# Patient Record
Sex: FEMALE | Race: WHITE | NOT HISPANIC OR LATINO | URBAN - METROPOLITAN AREA
[De-identification: names, ages, dates, MRNs, and addresses within clinical notes are randomized per-mention and may not be internally consistent; named-entity substitution may affect disease eponyms.]

---

## 2017-01-21 ENCOUNTER — OUTPATIENT (OUTPATIENT)
Dept: OUTPATIENT SERVICES | Facility: HOSPITAL | Age: 67
LOS: 1 days | End: 2017-01-21
Payer: MEDICARE

## 2017-01-21 ENCOUNTER — APPOINTMENT (OUTPATIENT)
Dept: FAMILY MEDICINE | Facility: HOSPITAL | Age: 67
End: 2017-01-21

## 2017-01-21 VITALS
DIASTOLIC BLOOD PRESSURE: 93 MMHG | SYSTOLIC BLOOD PRESSURE: 179 MMHG | HEART RATE: 93 BPM | OXYGEN SATURATION: 98 % | WEIGHT: 112 LBS | RESPIRATION RATE: 16 BRPM | TEMPERATURE: 97 F | BODY MASS INDEX: 18.93 KG/M2

## 2017-01-21 DIAGNOSIS — M81.0 AGE-RELATED OSTEOPOROSIS WITHOUT CURRENT PATHOLOGICAL FRACTURE: ICD-10-CM

## 2017-01-21 DIAGNOSIS — I10 ESSENTIAL (PRIMARY) HYPERTENSION: ICD-10-CM

## 2017-01-21 DIAGNOSIS — E03.9 HYPOTHYROIDISM, UNSPECIFIED: ICD-10-CM

## 2017-01-21 PROCEDURE — G0463: CPT

## 2017-04-20 ENCOUNTER — OUTPATIENT (OUTPATIENT)
Dept: OUTPATIENT SERVICES | Facility: HOSPITAL | Age: 67
LOS: 1 days | End: 2017-04-20
Payer: MEDICARE

## 2017-04-20 ENCOUNTER — APPOINTMENT (OUTPATIENT)
Dept: FAMILY MEDICINE | Facility: HOSPITAL | Age: 67
End: 2017-04-20

## 2017-04-20 VITALS
HEIGHT: 64.5 IN | TEMPERATURE: 97.5 F | OXYGEN SATURATION: 98 % | HEART RATE: 65 BPM | RESPIRATION RATE: 14 BRPM | WEIGHT: 114 LBS | DIASTOLIC BLOOD PRESSURE: 60 MMHG | SYSTOLIC BLOOD PRESSURE: 97 MMHG | BODY MASS INDEX: 19.23 KG/M2

## 2017-04-20 DIAGNOSIS — E03.9 HYPOTHYROIDISM, UNSPECIFIED: ICD-10-CM

## 2017-04-20 DIAGNOSIS — I10 ESSENTIAL (PRIMARY) HYPERTENSION: ICD-10-CM

## 2017-04-20 DIAGNOSIS — M54.5 LOW BACK PAIN: ICD-10-CM

## 2017-04-20 PROCEDURE — G0463: CPT

## 2017-07-24 ENCOUNTER — RX RENEWAL (OUTPATIENT)
Age: 67
End: 2017-07-24

## 2017-11-03 ENCOUNTER — OUTPATIENT (OUTPATIENT)
Dept: OUTPATIENT SERVICES | Facility: HOSPITAL | Age: 67
LOS: 1 days | End: 2017-11-03
Payer: MEDICARE

## 2017-11-03 ENCOUNTER — APPOINTMENT (OUTPATIENT)
Dept: FAMILY MEDICINE | Facility: HOSPITAL | Age: 67
End: 2017-11-03

## 2017-11-03 VITALS
SYSTOLIC BLOOD PRESSURE: 128 MMHG | WEIGHT: 98 LBS | BODY MASS INDEX: 16.56 KG/M2 | OXYGEN SATURATION: 98 % | HEART RATE: 92 BPM | DIASTOLIC BLOOD PRESSURE: 86 MMHG | RESPIRATION RATE: 16 BRPM | TEMPERATURE: 98.4 F

## 2017-11-03 DIAGNOSIS — R63.6 UNDERWEIGHT: ICD-10-CM

## 2017-11-03 DIAGNOSIS — F32.9 MAJOR DEPRESSIVE DISORDER, SINGLE EPISODE, UNSPECIFIED: ICD-10-CM

## 2017-11-03 DIAGNOSIS — I10 ESSENTIAL (PRIMARY) HYPERTENSION: ICD-10-CM

## 2017-11-03 PROCEDURE — G0463: CPT

## 2018-01-12 ENCOUNTER — RX RENEWAL (OUTPATIENT)
Age: 68
End: 2018-01-12

## 2018-02-11 ENCOUNTER — RX RENEWAL (OUTPATIENT)
Age: 68
End: 2018-02-11

## 2018-06-14 ENCOUNTER — OUTPATIENT (OUTPATIENT)
Dept: OUTPATIENT SERVICES | Facility: HOSPITAL | Age: 68
LOS: 1 days | End: 2018-06-14
Payer: MEDICARE

## 2018-06-14 ENCOUNTER — APPOINTMENT (OUTPATIENT)
Dept: FAMILY MEDICINE | Facility: HOSPITAL | Age: 68
End: 2018-06-14

## 2018-06-14 VITALS
SYSTOLIC BLOOD PRESSURE: 124 MMHG | TEMPERATURE: 97.7 F | WEIGHT: 93.38 LBS | DIASTOLIC BLOOD PRESSURE: 84 MMHG | BODY MASS INDEX: 15.78 KG/M2 | OXYGEN SATURATION: 98 % | RESPIRATION RATE: 14 BRPM | HEART RATE: 67 BPM

## 2018-06-14 DIAGNOSIS — Z00.00 ENCOUNTER FOR GENERAL ADULT MEDICAL EXAMINATION WITHOUT ABNORMAL FINDINGS: ICD-10-CM

## 2018-06-14 DIAGNOSIS — Z00.00 ENCOUNTER FOR GENERAL ADULT MEDICAL EXAMINATION W/OUT ABNORMAL FINDINGS: ICD-10-CM

## 2018-06-14 PROCEDURE — G0439: CPT

## 2018-06-14 RX ORDER — ASPIRIN ENTERIC COATED TABLETS 81 MG 81 MG/1
81 TABLET, DELAYED RELEASE ORAL DAILY
Qty: 1 | Refills: 1 | Status: ACTIVE | COMMUNITY
Start: 2017-01-21 | End: 1900-01-01

## 2018-06-14 RX ORDER — ELECTROLYTES/DEXTROSE
SOLUTION, ORAL ORAL DAILY
Qty: 30 | Refills: 0 | Status: ACTIVE | COMMUNITY
Start: 2018-06-14 | End: 1900-01-01

## 2018-06-14 NOTE — HISTORY OF PRESENT ILLNESS
[de-identified] : 66 yr old female with PMHx of smoking 50y 1PPD- still smokes 1/2 PPD hep c, presenile dementia, hypothyroid,back pain, depression,right hip fracture ( underwent ORIF) , double pneumonia who lives alone presents to clinic today for feeling more tired/ general malaise for the last couple of weeks. She has lost approximately 20 lbs unintentional weight loss.  \par Says she gets up tired in the morning and progresses throughout the day but denies any motor weakness. Denies palpitations, shortness of breath, changes in bowel or urinary habits.  Says her appetite has decreased over the last couple of months; she however denies nausea/ vomiting. Denies feeling depressed. Has difficulty going to sleep, cannot sleep more than an hour.- takes ativan.

## 2018-06-14 NOTE — PHYSICAL EXAM
[No Acute Distress] : no acute distress [Cachectic] : cachexia was observed [PERRL] : pupils equal round and reactive to light [No JVD] : no jugular venous distention [No Respiratory Distress] : no respiratory distress  [Clear to Auscultation] : lungs were clear to auscultation bilaterally [Normal Rate] : normal rate  [Regular Rhythm] : with a regular rhythm [Normal S1, S2] : normal S1 and S2 [No Carotid Bruits] : no carotid bruits [Soft] : abdomen soft [Non Tender] : non-tender [Non-distended] : non-distended [No HSM] : no HSM [No CVA Tenderness] : no CVA  tenderness

## 2018-06-14 NOTE — PLAN
[FreeTextEntry1] : 66 yr old female with PMHx of smoking 50y 1PPD- still smokes 1/2 PPD hep c, presenile dementia, hypothyroid,back pain, depression,right hip fracture ( underwent ORIF) , double pneumonia who lives alone presents to clinic today for feeling more tired/ general malaise for the last couple of weeks.\par \par - Needs PCV 13 - patient has had multiple unsuccessful blood draws attempts today and is wary to any more needles today. Planned for next visit\par - CBC; CMP- including alpha fetoprotein as patient has h/o chronic HCV, Lipid profile; HBA1c; \par - FIT test\par - TSH: patient is on levothyroxine 150 mg daily\par - Low dose screening CT chest- patient is in the 55-80 range ; 50 pack years\par - Needs Tdap on next visit\par - Needs to be screened for abdominal aortic aneurysm\par - screening mammogram\par - recall in 1 month\par \par

## 2018-06-18 ENCOUNTER — LABORATORY RESULT (OUTPATIENT)
Age: 68
End: 2018-06-18

## 2018-06-18 DIAGNOSIS — Z00.00 ENCOUNTER FOR GENERAL ADULT MEDICAL EXAMINATION WITHOUT ABNORMAL FINDINGS: ICD-10-CM

## 2018-07-20 ENCOUNTER — APPOINTMENT (OUTPATIENT)
Dept: HEMATOLOGY ONCOLOGY | Facility: CLINIC | Age: 68
End: 2018-07-20
Payer: MEDICARE

## 2018-07-20 VITALS
BODY MASS INDEX: 17.3 KG/M2 | DIASTOLIC BLOOD PRESSURE: 64 MMHG | SYSTOLIC BLOOD PRESSURE: 110 MMHG | TEMPERATURE: 98 F | HEIGHT: 62 IN | HEART RATE: 72 BPM | WEIGHT: 94 LBS

## 2018-07-20 DIAGNOSIS — Z63.4 DISAPPEARANCE AND DEATH OF FAMILY MEMBER: ICD-10-CM

## 2018-07-20 DIAGNOSIS — Z60.2 PROBLEMS RELATED TO LIVING ALONE: ICD-10-CM

## 2018-07-20 DIAGNOSIS — Z78.9 OTHER SPECIFIED HEALTH STATUS: ICD-10-CM

## 2018-07-20 DIAGNOSIS — R46.0 VERY LOW LVL OF PERSONAL HYGIENE: ICD-10-CM

## 2018-07-20 DIAGNOSIS — Z82.49 FAMILY HISTORY OF ISCHEMIC HEART DISEASE AND OTHER DISEASES OF THE CIRCULATORY SYSTEM: ICD-10-CM

## 2018-07-20 DIAGNOSIS — F17.200 NICOTINE DEPENDENCE, UNSPECIFIED, UNCOMPLICATED: ICD-10-CM

## 2018-07-20 PROCEDURE — 99205 OFFICE O/P NEW HI 60 MIN: CPT

## 2018-07-20 SDOH — SOCIAL STABILITY - SOCIAL INSECURITY: DISSAPEARANCE AND DEATH OF FAMILY MEMBER: Z63.4

## 2018-07-20 SDOH — SOCIAL STABILITY - SOCIAL INSECURITY: PROBLEMS RELATED TO LIVING ALONE: Z60.2

## 2018-09-04 ENCOUNTER — LABORATORY RESULT (OUTPATIENT)
Age: 68
End: 2018-09-04

## 2018-09-04 LAB
ALBUMIN SERPL ELPH-MCNC: 4.2 G/DL
ALP BLD-CCNC: 87 U/L
ALT SERPL-CCNC: 27 U/L
ANION GAP SERPL CALC-SCNC: 12 MMOL/L
AST SERPL-CCNC: 34 U/L
BASOPHILS # BLD AUTO: 0.01 K/UL
BASOPHILS NFR BLD AUTO: 0.8 %
BILIRUB SERPL-MCNC: 0.5 MG/DL
BUN SERPL-MCNC: 24 MG/DL
CALCIUM SERPL-MCNC: 9.1 MG/DL
CHLORIDE SERPL-SCNC: 100 MMOL/L
CO2 SERPL-SCNC: 30 MMOL/L
CREAT SERPL-MCNC: 0.88 MG/DL
EOSINOPHIL # BLD AUTO: 0.04 K/UL
EOSINOPHIL NFR BLD AUTO: 3 %
HCT VFR BLD CALC: 36.1 %
HGB BLD-MCNC: 11.5 G/DL
IMM GRANULOCYTES NFR BLD AUTO: 0 %
LDH SERPL-CCNC: 173 U/L
LYMPHOCYTES # BLD AUTO: 0.88 K/UL
LYMPHOCYTES NFR BLD AUTO: 66.7 %
MAN DIFF?: NORMAL
MCHC RBC-ENTMCNC: 27.4 PG
MCHC RBC-ENTMCNC: 31.9 GM/DL
MCV RBC AUTO: 86.2 FL
MONOCYTES # BLD AUTO: 0.21 K/UL
MONOCYTES NFR BLD AUTO: 15.9 %
NEUTROPHILS # BLD AUTO: 0.18 K/UL
NEUTROPHILS NFR BLD AUTO: 13.6 %
PLATELET # BLD AUTO: 75 K/UL
POTASSIUM SERPL-SCNC: 5.1 MMOL/L
PROT SERPL-MCNC: 7.4 G/DL
RBC # BLD: 4.19 M/UL
RBC # FLD: 14.5 %
SODIUM SERPL-SCNC: 142 MMOL/L
WBC # FLD AUTO: 1.32 K/UL

## 2018-09-06 LAB — B2 MICROGLOB SERPL-MCNC: 6.6 MG/L

## 2018-09-11 ENCOUNTER — APPOINTMENT (OUTPATIENT)
Dept: HEMATOLOGY ONCOLOGY | Facility: CLINIC | Age: 68
End: 2018-09-11
Payer: MEDICARE

## 2018-09-11 VITALS
DIASTOLIC BLOOD PRESSURE: 54 MMHG | WEIGHT: 101 LBS | TEMPERATURE: 97.9 F | SYSTOLIC BLOOD PRESSURE: 86 MMHG | BODY MASS INDEX: 18.47 KG/M2 | HEART RATE: 72 BPM

## 2018-09-11 PROCEDURE — 99214 OFFICE O/P EST MOD 30 MIN: CPT

## 2018-11-06 ENCOUNTER — INPATIENT (INPATIENT)
Facility: HOSPITAL | Age: 68
LOS: 2 days | Discharge: HOME | DRG: 602 | End: 2018-11-09
Attending: FAMILY MEDICINE | Admitting: STUDENT IN AN ORGANIZED HEALTH CARE EDUCATION/TRAINING PROGRAM
Payer: MEDICARE

## 2018-11-06 VITALS
OXYGEN SATURATION: 99 % | SYSTOLIC BLOOD PRESSURE: 166 MMHG | WEIGHT: 100.09 LBS | DIASTOLIC BLOOD PRESSURE: 77 MMHG | TEMPERATURE: 98 F | RESPIRATION RATE: 17 BRPM | HEIGHT: 63 IN | HEART RATE: 75 BPM

## 2018-11-06 DIAGNOSIS — D69.6 THROMBOCYTOPENIA, UNSPECIFIED: ICD-10-CM

## 2018-11-06 DIAGNOSIS — L03.115 CELLULITIS OF RIGHT LOWER LIMB: ICD-10-CM

## 2018-11-06 DIAGNOSIS — E46 UNSPECIFIED PROTEIN-CALORIE MALNUTRITION: ICD-10-CM

## 2018-11-06 DIAGNOSIS — G30.0 ALZHEIMER'S DISEASE WITH EARLY ONSET: ICD-10-CM

## 2018-11-06 DIAGNOSIS — E03.9 HYPOTHYROIDISM, UNSPECIFIED: ICD-10-CM

## 2018-11-06 DIAGNOSIS — F17.200 NICOTINE DEPENDENCE, UNSPECIFIED, UNCOMPLICATED: ICD-10-CM

## 2018-11-06 DIAGNOSIS — D70.9 NEUTROPENIA, UNSPECIFIED: ICD-10-CM

## 2018-11-06 DIAGNOSIS — M54.5 LOW BACK PAIN: ICD-10-CM

## 2018-11-06 DIAGNOSIS — Z29.9 ENCOUNTER FOR PROPHYLACTIC MEASURES, UNSPECIFIED: ICD-10-CM

## 2018-11-06 LAB
ALBUMIN SERPL ELPH-MCNC: 3.4 G/DL — SIGNIFICANT CHANGE UP (ref 3.3–5)
ALP SERPL-CCNC: 82 U/L — SIGNIFICANT CHANGE UP (ref 40–120)
ALT FLD-CCNC: 30 U/L DA — SIGNIFICANT CHANGE UP (ref 10–45)
ANION GAP SERPL CALC-SCNC: 8 MMOL/L — SIGNIFICANT CHANGE UP (ref 5–17)
ANISOCYTOSIS BLD QL: SLIGHT — SIGNIFICANT CHANGE UP
APTT BLD: 41.6 SEC — HIGH (ref 27.5–36.3)
AST SERPL-CCNC: 38 U/L — SIGNIFICANT CHANGE UP (ref 10–40)
BASOPHILS NFR BLD AUTO: 1 % — SIGNIFICANT CHANGE UP (ref 0–2)
BILIRUB SERPL-MCNC: 0.7 MG/DL — SIGNIFICANT CHANGE UP (ref 0.2–1.2)
BUN SERPL-MCNC: 8 MG/DL — SIGNIFICANT CHANGE UP (ref 7–23)
CALCIUM SERPL-MCNC: 8.6 MG/DL — SIGNIFICANT CHANGE UP (ref 8.4–10.5)
CHLORIDE SERPL-SCNC: 103 MMOL/L — SIGNIFICANT CHANGE UP (ref 96–108)
CO2 SERPL-SCNC: 29 MMOL/L — SIGNIFICANT CHANGE UP (ref 22–31)
CREAT SERPL-MCNC: 0.95 MG/DL — SIGNIFICANT CHANGE UP (ref 0.5–1.3)
EOSINOPHIL NFR BLD AUTO: 3 % — SIGNIFICANT CHANGE UP (ref 0–6)
GLUCOSE SERPL-MCNC: 121 MG/DL — HIGH (ref 70–99)
HCT VFR BLD CALC: 35.6 % — SIGNIFICANT CHANGE UP (ref 34.5–45)
HGB BLD-MCNC: 11.8 G/DL — SIGNIFICANT CHANGE UP (ref 11.5–15.5)
INR BLD: 1.21 RATIO — HIGH (ref 0.88–1.16)
LYMPHOCYTES # BLD AUTO: 68 % — HIGH (ref 13–44)
MCHC RBC-ENTMCNC: 28.7 PG — SIGNIFICANT CHANGE UP (ref 27–34)
MCHC RBC-ENTMCNC: 33.2 GM/DL — SIGNIFICANT CHANGE UP (ref 32–36)
MCV RBC AUTO: 86.6 FL — SIGNIFICANT CHANGE UP (ref 80–100)
MONOCYTES NFR BLD AUTO: 17 % — HIGH (ref 2–14)
NEUTROPHILS NFR BLD AUTO: 7 % — LOW (ref 43–77)
NEUTS BAND # BLD: 1 % — SIGNIFICANT CHANGE UP (ref 0–8)
PLAT MORPH BLD: NORMAL — SIGNIFICANT CHANGE UP
PLATELET # BLD AUTO: 94 K/UL — LOW (ref 150–400)
POTASSIUM SERPL-MCNC: 3.6 MMOL/L — SIGNIFICANT CHANGE UP (ref 3.5–5.3)
POTASSIUM SERPL-SCNC: 3.6 MMOL/L — SIGNIFICANT CHANGE UP (ref 3.5–5.3)
PROT SERPL-MCNC: 7.6 G/DL — SIGNIFICANT CHANGE UP (ref 6–8.3)
PROTHROM AB SERPL-ACNC: 13.6 SEC — HIGH (ref 10–12.9)
RBC # BLD: 4.11 M/UL — SIGNIFICANT CHANGE UP (ref 3.8–5.2)
RBC # FLD: 12.5 % — SIGNIFICANT CHANGE UP (ref 10.3–14.5)
RBC BLD AUTO: ABNORMAL
SODIUM SERPL-SCNC: 140 MMOL/L — SIGNIFICANT CHANGE UP (ref 135–145)
VARIANT LYMPHS # BLD: 3 % — SIGNIFICANT CHANGE UP (ref 0–6)
WBC # BLD: 1.4 K/UL — LOW (ref 3.8–10.5)
WBC # FLD AUTO: 1.4 K/UL — LOW (ref 3.8–10.5)

## 2018-11-06 PROCEDURE — 93010 ELECTROCARDIOGRAM REPORT: CPT

## 2018-11-06 PROCEDURE — 99223 1ST HOSP IP/OBS HIGH 75: CPT

## 2018-11-06 PROCEDURE — 99222 1ST HOSP IP/OBS MODERATE 55: CPT

## 2018-11-06 PROCEDURE — 73590 X-RAY EXAM OF LOWER LEG: CPT | Mod: 26,RT

## 2018-11-06 PROCEDURE — 99285 EMERGENCY DEPT VISIT HI MDM: CPT

## 2018-11-06 RX ORDER — SODIUM CHLORIDE 9 MG/ML
3 INJECTION INTRAMUSCULAR; INTRAVENOUS; SUBCUTANEOUS ONCE
Qty: 0 | Refills: 0 | Status: COMPLETED | OUTPATIENT
Start: 2018-11-06 | End: 2018-11-06

## 2018-11-06 RX ORDER — MORPHINE SULFATE 50 MG/1
90 CAPSULE, EXTENDED RELEASE ORAL
Qty: 0 | Refills: 0 | Status: DISCONTINUED | OUTPATIENT
Start: 2018-11-06 | End: 2018-11-09

## 2018-11-06 RX ORDER — VANCOMYCIN HCL 1 G
1000 VIAL (EA) INTRAVENOUS ONCE
Qty: 0 | Refills: 0 | Status: COMPLETED | OUTPATIENT
Start: 2018-11-06 | End: 2018-11-06

## 2018-11-06 RX ORDER — SENNA PLUS 8.6 MG/1
2 TABLET ORAL AT BEDTIME
Qty: 0 | Refills: 0 | Status: DISCONTINUED | OUTPATIENT
Start: 2018-11-06 | End: 2018-11-09

## 2018-11-06 RX ORDER — LEVOTHYROXINE SODIUM 125 MCG
150 TABLET ORAL DAILY
Qty: 0 | Refills: 0 | Status: DISCONTINUED | OUTPATIENT
Start: 2018-11-06 | End: 2018-11-09

## 2018-11-06 RX ORDER — PIPERACILLIN AND TAZOBACTAM 4; .5 G/20ML; G/20ML
3.38 INJECTION, POWDER, LYOPHILIZED, FOR SOLUTION INTRAVENOUS ONCE
Qty: 0 | Refills: 0 | Status: COMPLETED | OUTPATIENT
Start: 2018-11-06 | End: 2018-11-06

## 2018-11-06 RX ORDER — MEMANTINE HYDROCHLORIDE 10 MG/1
10 TABLET ORAL
Qty: 0 | Refills: 0 | Status: DISCONTINUED | OUTPATIENT
Start: 2018-11-06 | End: 2018-11-09

## 2018-11-06 RX ORDER — SODIUM CHLORIDE 9 MG/ML
1000 INJECTION INTRAMUSCULAR; INTRAVENOUS; SUBCUTANEOUS
Qty: 0 | Refills: 0 | Status: DISCONTINUED | OUTPATIENT
Start: 2018-11-06 | End: 2018-11-09

## 2018-11-06 RX ORDER — ALPRAZOLAM 0.25 MG
2 TABLET ORAL
Qty: 0 | Refills: 0 | Status: DISCONTINUED | OUTPATIENT
Start: 2018-11-06 | End: 2018-11-09

## 2018-11-06 RX ORDER — ENOXAPARIN SODIUM 100 MG/ML
40 INJECTION SUBCUTANEOUS DAILY
Qty: 0 | Refills: 0 | Status: DISCONTINUED | OUTPATIENT
Start: 2018-11-06 | End: 2018-11-09

## 2018-11-06 RX ORDER — NICOTINE POLACRILEX 2 MG
1 GUM BUCCAL DAILY
Qty: 0 | Refills: 0 | Status: DISCONTINUED | OUTPATIENT
Start: 2018-11-06 | End: 2018-11-09

## 2018-11-06 RX ORDER — ZINC SULFATE TAB 220 MG (50 MG ZINC EQUIVALENT) 220 (50 ZN) MG
220 TAB ORAL DAILY
Qty: 0 | Refills: 0 | Status: DISCONTINUED | OUTPATIENT
Start: 2018-11-06 | End: 2018-11-09

## 2018-11-06 RX ORDER — ASCORBIC ACID 60 MG
500 TABLET,CHEWABLE ORAL DAILY
Qty: 0 | Refills: 0 | Status: DISCONTINUED | OUTPATIENT
Start: 2018-11-06 | End: 2018-11-09

## 2018-11-06 RX ORDER — TETANUS TOXOID, REDUCED DIPHTHERIA TOXOID AND ACELLULAR PERTUSSIS VACCINE, ADSORBED 5; 2.5; 8; 8; 2.5 [IU]/.5ML; [IU]/.5ML; UG/.5ML; UG/.5ML; UG/.5ML
0.5 SUSPENSION INTRAMUSCULAR ONCE
Qty: 0 | Refills: 0 | Status: COMPLETED | OUTPATIENT
Start: 2018-11-06 | End: 2018-11-06

## 2018-11-06 RX ORDER — DONEPEZIL HYDROCHLORIDE 10 MG/1
10 TABLET, FILM COATED ORAL AT BEDTIME
Qty: 0 | Refills: 0 | Status: DISCONTINUED | OUTPATIENT
Start: 2018-11-06 | End: 2018-11-09

## 2018-11-06 RX ORDER — MORPHINE SULFATE 50 MG/1
60 CAPSULE, EXTENDED RELEASE ORAL
Qty: 0 | Refills: 0 | Status: DISCONTINUED | OUTPATIENT
Start: 2018-11-06 | End: 2018-11-06

## 2018-11-06 RX ADMIN — ENOXAPARIN SODIUM 40 MILLIGRAM(S): 100 INJECTION SUBCUTANEOUS at 12:49

## 2018-11-06 RX ADMIN — SODIUM CHLORIDE 125 MILLILITER(S): 9 INJECTION INTRAMUSCULAR; INTRAVENOUS; SUBCUTANEOUS at 07:59

## 2018-11-06 RX ADMIN — PIPERACILLIN AND TAZOBACTAM 200 GRAM(S): 4; .5 INJECTION, POWDER, LYOPHILIZED, FOR SOLUTION INTRAVENOUS at 07:30

## 2018-11-06 RX ADMIN — Medication 250 MILLIGRAM(S): at 10:25

## 2018-11-06 RX ADMIN — MEMANTINE HYDROCHLORIDE 10 MILLIGRAM(S): 10 TABLET ORAL at 17:33

## 2018-11-06 RX ADMIN — DONEPEZIL HYDROCHLORIDE 10 MILLIGRAM(S): 10 TABLET, FILM COATED ORAL at 21:57

## 2018-11-06 RX ADMIN — SODIUM CHLORIDE 3 MILLILITER(S): 9 INJECTION INTRAMUSCULAR; INTRAVENOUS; SUBCUTANEOUS at 07:30

## 2018-11-06 RX ADMIN — MORPHINE SULFATE 90 MILLIGRAM(S): 50 CAPSULE, EXTENDED RELEASE ORAL at 18:47

## 2018-11-06 RX ADMIN — MORPHINE SULFATE 90 MILLIGRAM(S): 50 CAPSULE, EXTENDED RELEASE ORAL at 18:09

## 2018-11-06 RX ADMIN — Medication 1 PATCH: at 23:52

## 2018-11-06 RX ADMIN — Medication 2 MILLIGRAM(S): at 17:33

## 2018-11-06 RX ADMIN — Medication 100 MILLIGRAM(S): at 12:24

## 2018-11-06 RX ADMIN — Medication 100 MILLIGRAM(S): at 21:57

## 2018-11-06 RX ADMIN — PIPERACILLIN AND TAZOBACTAM 3.38 GRAM(S): 4; .5 INJECTION, POWDER, LYOPHILIZED, FOR SOLUTION INTRAVENOUS at 08:00

## 2018-11-06 NOTE — ED ADULT NURSE NOTE - CHPI ED NUR SYMPTOMS NEG
no vomiting/no purulent drainage/no rectal pain/no fever/no drainage/no bleeding at site/no blood in mucus/no chills

## 2018-11-06 NOTE — ED PROVIDER NOTE - NS ED ROS FT
Constitutional: (-) fever  (-)chills  (-)sweats  Eyes/ENT: (-) blurry vision, (-) epistaxis  (-)rhinorrhea   (-) sore throat    Cardiovascular: (-) chest pain, (-) palpitations (-) edema   Respiratory: (-) cough, (-) shortness of breath   Gastrointestinal: (-)nausea  (-)vomiting, (-) diarrhea  (-) abdominal pain   :  (-)dysuria, (-)frequency, (-)urgency, (-)hematuria  Musculoskeletal: (-) neck pain, (-) back pain, (-) joint pain  Integumentary: cellulitis open wound R leg (-) edema  Neurological: (-) headache, (-) altered mental status  (-)LOC

## 2018-11-06 NOTE — PROGRESS NOTE ADULT - PROBLEM SELECTOR PLAN 6
- Ensure Enlive TID with meals  - F/U Nutrition consult - Ensure Enlive TID with meals  - Vitamin C and zinc supplementation for wound healing  - F/U Nutrition consult

## 2018-11-06 NOTE — PROGRESS NOTE ADULT - PROBLEM SELECTOR PLAN 1
- IV Clindamycin TID (Day #1)  - s/p IV Vanco/Zosyn in ED  - Elevate right leg  - Warm compresses  - Ambulate as tolerated  - Surgery consult appreciated: No indication for debridement or surgical intervention at this time.  - F/U Blood Cultures

## 2018-11-06 NOTE — H&P ADULT - NSHPLABSRESULTS_GEN_ALL_CORE
LABS:                        11.8   1.4   )-----------( 94       ( 06 Nov 2018 07:15 )             35.6     11-06    140  |  103  |  8   ----------------------------<  121<H>  3.6   |  29  |  0.95    Ca    8.6      06 Nov 2018 07:15    TPro  7.6  /  Alb  3.4  /  TBili  0.7  /  DBili  x   /  AST  38  /  ALT  30  /  AlkPhos  82  11-06    PT/INR - ( 06 Nov 2018 07:15 )   PT: 13.6 sec;   INR: 1.21 ratio         PTT - ( 06 Nov 2018 07:15 )  PTT:41.6 sec     CAPILLARY BLOOD GLUCOSE                RADIOLOGY & ADDITIONAL TESTS:  < from: Xray Tibia + Fibula 2 Views, Right (11.06.18 @ 07:37) >    INTERPRETATION:  Left tib-fib. Patient had local injury.    There is no sense of knee effusion.    The knee and ankle are relatively free of degeneration.    No bone destruction or fracture is evident.    IMPRESSION: No acute finding.    < end of copied text >      Consultant(s) Notes Reviewed:  [x ] YES  [ ] NO  Care Discussed with Consultants/Other Providers [ x] YES  [ ] NO  Imaging Personally Reviewed:  [ ] YES  [ ] NO

## 2018-11-06 NOTE — PATIENT PROFILE ADULT - VISION (WITH CORRECTIVE LENSES IF THE PATIENT USUALLY WEARS THEM):
Normal vision: sees adequately in most situations; can see medication labels, newsprint/wears reading glass

## 2018-11-06 NOTE — CONSULT NOTE ADULT - PROBLEM SELECTOR RECOMMENDATION 9
Patient with chronic absolute neutropenia, due to possible underlying marrow disorder ( eg. an underlying myeloproliferative disorder cannot be excluded). As patient declined several times bone marrow aspirate and biopsy. Use of G-CSF may be considered, though it may exacerbate underlying marrow process. Agree with broad antibiotic coverage.

## 2018-11-06 NOTE — PROGRESS NOTE ADULT - SUBJECTIVE AND OBJECTIVE BOX
CHIEF COMPLAINT: Right Lower leg Injury/Pain    SUBJECTIVE:   HPI:  67 yo F patient of Dr. Adame admitted today 11/6/18 for right cellulitic wound. Pt hit her right shin against an "old metal arnold piece" on an antique piece of furniture in her home 2 weeks ago. Pt describes that wound initially started to heal but she re-traumatized region by hitting exact same region against metal piece approx 5 days following initial incident. She became concerned as the area round  wound became very red, painful, tender to touch with black scab formation over wound. She denies any fevers, chills, or loss of circulation/sensation in her extremities. Pt describes depressed mood, decreased appetite with associated unintentional weight loss over the past year (estimates 10-15 pound loss in the last 6 months). She  feels lonely living at home with her dog after her  of 35 years passed away about 5 years ago. Pt is a current 1ppd smoker and has smoked for the last 50 years. She has a history of chronic back pain for the last 18 years for which she is prescribed morphine and Xanax from pain management physician.  Pt also has history of neutropenia/leukopenia that is being addressed by Dr. Gloria (Heme/Onc). Pt has dementia (diagnosed in 2002), which she takes Aricept and Namenda for.     11/6: Pt was initially admitted to Medicine service this morning and was transferred to Family Practice service this afternoon. The above history was obtained from patient. Pt was seen and examined at bedside. Dr. Sun (Surgery) was also at bedside, assessed patient and does not see need for surgical intervention at this time.     REVIEW OF SYSTEMS:    CONSTITUTIONAL: +Weight loss  No fevers or chills  RESPIRATORY: No cough, wheezing, hemoptysis; No shortness of breath  CARDIOVASCULAR: No chest pain or palpitations  GASTROINTESTINAL: No abdominal or epigastric pain. No nausea, vomiting, or hematemesis; No diarrhea or constipation. GENITOURINARY: No dysuria, frequency or hematuria  NEUROLOGICAL: +Chronic Back Pain; +Forgetful (diagnosed with dementia) No numbness or weakness  SKIN: + Right leg wound with pain   PSYCH: +Depressed Mood; +Anorexia; +Anhedonia  All other review of systems is negative unless indicated above    Vital Signs Last 24 Hrs  T(C): 37 (06 Nov 2018 15:19), Max: 37 (06 Nov 2018 15:19)  T(F): 98.6 (06 Nov 2018 15:19), Max: 98.6 (06 Nov 2018 15:19)  HR: 65 (06 Nov 2018 15:19) (60 - 75)  BP: 114/91 (06 Nov 2018 15:19) (114/91 - 166/77)  RR: 19 (06 Nov 2018 15:19) (17 - 19)  SpO2: 98% (06 Nov 2018 15:19) (97% - 99%)      PHYSICAL EXAM:  Constitutional: fail cachectic woman in mild distress due to pain, awake and alert  HEENT: PERR, EOMI, Normal Hearing, MMM  Neck: Soft and supple, No LAD, No JVD  Respiratory: Breath sounds are clear bilaterally, No wheezing, rales or rhonchi  Cardiovascular: S1 and S2, regular rate and rhythm, no Murmurs, gallops or rubs  Gastrointestinal: Bowel Sounds present, soft, nontender, nondistended, no guarding, no rebound  Extremities: No peripheral edema  Vascular: 2+ peripheral pulses  Neurological: A/O x 3, no focal deficits  Musculoskeletal: 5/5 strength b/l upper and lower extremities  Skin: No rashes    MEDICATIONS:  MEDICATIONS  (STANDING):  ALPRAZolam 2 milliGRAM(s) Oral two times a day  ascorbic acid 500 milliGRAM(s) Oral daily  clindamycin IVPB      clindamycin IVPB 600 milliGRAM(s) IV Intermittent every 8 hours  diphtheria/tetanus/pertussis (acellular) Vaccine (ADAcel) 0.5 milliLiter(s) IntraMuscular once  donepezil 10 milliGRAM(s) Oral at bedtime  enoxaparin Injectable 40 milliGRAM(s) SubCutaneous daily  levothyroxine 150 MICROGram(s) Oral daily  memantine 10 milliGRAM(s) Oral two times a day  morphine ER Tablet 90 milliGRAM(s) Oral two times a day  multivitamin 1 Tablet(s) Oral daily  nicotine -  14 mG/24Hr(s) Patch 1 patch Transdermal daily  sodium chloride 0.9%. 1000 milliLiter(s) (125 mL/Hr) IV Continuous <Continuous>  zinc sulfate 220 milliGRAM(s) Oral daily      LABS: All Labs Reviewed:                        11.8   1.4   )-----------( 94       ( 06 Nov 2018 07:15 )             35.6     11-06    140  |  103  |  8   ----------------------------<  121<H>  3.6   |  29  |  0.95    Ca    8.6      06 Nov 2018 07:15    TPro  7.6  /  Alb  3.4  /  TBili  0.7  /  DBili  x   /  AST  38  /  ALT  30  /  AlkPhos  82  11-06    PT/INR - ( 06 Nov 2018 07:15 )   PT: 13.6 sec;   INR: 1.21 ratio         PTT - ( 06 Nov 2018 07:15 )  PTT:41.6 sec      Blood Culture:     RADIOLOGY/EKG:    DVT PPX:    ADVANCED DIRECTIVE:    DISPOSITION: CHIEF COMPLAINT: Right Lower leg Injury/Pain    SUBJECTIVE:   HPI:  69 yo F patient of Dr. Adame admitted today 11/6/18 for right cellulitic wound. Pt hit her right shin against an "old metal arnold piece" on an antique piece of furniture in her home 2 weeks ago. Pt describes that wound initially started to heal but she re-traumatized region by hitting the exact same region against metal piece approx 5 days following initial incident. She became concerned as the area around  wound became very red, painful, tender to touch with black scab formation over wound. She denies any fevers, chills, or loss of circulation/sensation in her extremities. Pt describes depressed mood, decreased appetite with associated unintentional weight loss over the past year (estimates 10-15 pound loss in the last 6 months). She  feels lonely living at home with her dog after her  of 35 years passed away about 5 years ago. Pt is a current 1ppd smoker and has smoked for the last 50 years. She has a history of chronic back pain for the last 18 years for which she is prescribed morphine and Xanax from pain management physician.  Pt also has history of neutropenia/leukopenia that is being addressed by Dr. Gloria (Heme/Onc). Pt has dementia (diagnosed in 2002), which she takes Aricept and Namenda for.     In the ED, she was given IV Vancomycin, IV Zosyn, IV Clindamycin and started on IV NaCL @125mL/hr. She was afebrile on triage and has remained afebrile to present. All other vitals are within normal limits.     11/6: Pt was initially admitted to Medicine service this morning and was transferred to Family Practice service this afternoon. The above history was obtained from patient. Pt was seen and examined at bedside. Pt notes that Dr. Sun (Surgery) was also at bedside, assessed patient and does not see need for surgical intervention at this time.     REVIEW OF SYSTEMS:  CONSTITUTIONAL: +Weight loss  No fevers or chills  RESPIRATORY: No cough, wheezing, hemoptysis; No shortness of breath  CARDIOVASCULAR: No chest pain or palpitations  GASTROINTESTINAL: No abdominal or epigastric pain. No nausea, vomiting, or hematemesis; No diarrhea or constipation. GENITOURINARY: No dysuria, frequency or hematuria  NEUROLOGICAL: +Chronic Back Pain; +Forgetful (diagnosed with dementia) No numbness or weakness  SKIN: + Right leg wound with pain   PSYCH: +Depressed Mood; +Anorexia; +Anhedonia  All other review of systems is negative unless indicated above    Vital Signs Last 24 Hrs  T(C): 37 (06 Nov 2018 15:19), Max: 37 (06 Nov 2018 15:19)  T(F): 98.6 (06 Nov 2018 15:19), Max: 98.6 (06 Nov 2018 15:19)  HR: 65 (06 Nov 2018 15:19) (60 - 75)  BP: 114/91 (06 Nov 2018 15:19) (114/91 - 166/77)  RR: 19 (06 Nov 2018 15:19) (17 - 19)  SpO2: 98% (06 Nov 2018 15:19) (97% - 99%)      PHYSICAL EXAM:  Constitutional: frail cachectic woman in mild distress due to pain, awake and alert  HEENT: PERR, EOMI, Normal Hearing  Neck: Soft and supple, No LAD, No JVD  Respiratory: Breath sounds are clear bilaterally, No wheezing, rales or rhonchi  Cardiovascular: S1 and S2, regular rate and rhythm, no Murmurs, gallops or rubs  Gastrointestinal: Bowel Sounds present, soft, nontender, nondistended, no guarding, no rebound  Extremities: 1+ pedal edema in right extremity; approx 5 cm black eschar formation on right anterior leg with mild erythema surrounding, tender to light palpation, dorsalis pedis pulses 2+ intact  Vascular: 2+ peripheral pulses  Neurological: A/O x 3, no focal deficits, sensation intact in all extremities  Musculoskeletal: 5/5 strength b/l upper and lower extremities  Skin: pprox 5 cm black eschar formation on right anterior leg with mild erythema surrounding, tender to light palpation,    MEDICATIONS:  MEDICATIONS  (STANDING):  ALPRAZolam 2 milliGRAM(s) Oral two times a day  ascorbic acid 500 milliGRAM(s) Oral daily  clindamycin IVPB      clindamycin IVPB 600 milliGRAM(s) IV Intermittent every 8 hours  diphtheria/tetanus/pertussis (acellular) Vaccine (ADAcel) 0.5 milliLiter(s) IntraMuscular once  donepezil 10 milliGRAM(s) Oral at bedtime  enoxaparin Injectable 40 milliGRAM(s) SubCutaneous daily  levothyroxine 150 MICROGram(s) Oral daily  memantine 10 milliGRAM(s) Oral two times a day  morphine ER Tablet 90 milliGRAM(s) Oral two times a day  multivitamin 1 Tablet(s) Oral daily  nicotine -  14 mG/24Hr(s) Patch 1 patch Transdermal daily  sodium chloride 0.9%. 1000 milliLiter(s) (125 mL/Hr) IV Continuous <Continuous>  zinc sulfate 220 milliGRAM(s) Oral daily    MEDICATIONS  (PRN):  senna 2 Tablet(s) Oral at bedtime PRN Constipation      LABS: All Labs Reviewed:                        11.8   1.4   )-----------( 94       ( 06 Nov 2018 07:15 )             35.6     11-06    140  |  103  |  8   ----------------------------<  121<H>  3.6   |  29  |  0.95    Ca    8.6      06 Nov 2018 07:15    TPro  7.6  /  Alb  3.4  /  TBili  0.7  /  DBili  x   /  AST  38  /  ALT  30  /  AlkPhos  82  11-06    PT/INR - ( 06 Nov 2018 07:15 )   PT: 13.6 sec;   INR: 1.21 ratio    PTT - ( 06 Nov 2018 07:15 )  PTT:41.6 sec      Blood Culture: Pending    RADIOLOGY/EKG: < from: Xray Tibia + Fibula 2 Views, Right (11.06.18 @ 07:37) >  INTERPRETATION:  Left tib-fib. Patient had local injury.    There is no sense of knee effusion.    The knee and ankle are relatively free of degeneration.    No bone destruction or fracture is evident.    IMPRESSION: No acute finding.      DVT PPX: ON Lovenox    DISPOSITION: [facility]ON Unit CHIEF COMPLAINT: Right Lower leg Injury/Pain    SUBJECTIVE:   HPI:  69 yo F patient of Dr. Adame admitted today 11/6/18 for right cellulitic wound. Pt hit her right shin against an "old metal arnold piece" on an antique piece of furniture in her home 2 weeks ago. Pt describes that wound initially started to heal but she re-traumatized region by hitting the exact same region against metal piece approx 5 days following initial incident. She became concerned as the area around  wound became very red, painful, tender to touch with black scab formation over wound. She denies any fevers, chills, or loss of circulation/sensation in her extremities. Pt describes depressed mood, decreased appetite with associated unintentional weight loss over the past year (estimates 10-15 pound loss in the last 6 months). She  feels lonely living at home with her dog after her  of 35 years passed away about 5 years ago. Pt is a current 1ppd smoker and has smoked for the last 50 years. She has a history of chronic back pain for the last 18 years for which she is prescribed morphine and Xanax from pain management physician.  Pt also has history of neutropenia/leukopenia that is being addressed by Dr. Gloria (Heme/Onc). Pt has dementia (diagnosed in 2002), which she takes Aricept and Namenda for.     In the ED, she was given IV Vancomycin, IV Zosyn, IV Clindamycin and started on IV NaCL @125mL/hr. She was afebrile on triage and has remained afebrile to present. All other vitals are within normal limits.     11/6: Pt was initially admitted to Medicine service this morning and was transferred to Family Practice service this afternoon. The above history was obtained from patient. Pt was seen and examined at bedside. Pt notes that redness improved following IV Abx. Dr. Sun (Surgery) was also at bedside, assessed patient and does not see need for surgical intervention at this time.     REVIEW OF SYSTEMS:  CONSTITUTIONAL: +Weight loss  No fevers or chills  RESPIRATORY: No cough, wheezing, hemoptysis; No shortness of breath  CARDIOVASCULAR: No chest pain or palpitations  GASTROINTESTINAL: No abdominal or epigastric pain. No nausea, vomiting, or hematemesis; No diarrhea or constipation. GENITOURINARY: No dysuria, frequency or hematuria  NEUROLOGICAL: +Chronic Back Pain; +Forgetful (diagnosed with dementia) No numbness or weakness  SKIN: + Right leg wound with pain   PSYCH: +Depressed Mood; +Anorexia; +Anhedonia  All other review of systems is negative unless indicated above    Vital Signs Last 24 Hrs  T(C): 37 (06 Nov 2018 15:19), Max: 37 (06 Nov 2018 15:19)  T(F): 98.6 (06 Nov 2018 15:19), Max: 98.6 (06 Nov 2018 15:19)  HR: 65 (06 Nov 2018 15:19) (60 - 75)  BP: 114/91 (06 Nov 2018 15:19) (114/91 - 166/77)  RR: 19 (06 Nov 2018 15:19) (17 - 19)  SpO2: 98% (06 Nov 2018 15:19) (97% - 99%)      PHYSICAL EXAM:  Constitutional: frail cachectic woman in mild distress due to pain, awake and alert  HEENT: PERR, EOMI, Normal Hearing  Neck: Soft and supple, No LAD, No JVD  Respiratory: Breath sounds are clear bilaterally, No wheezing, rales or rhonchi  Cardiovascular: S1 and S2, regular rate and rhythm, no Murmurs, gallops or rubs  Gastrointestinal: Bowel Sounds present, soft, nontender, nondistended, no guarding, no rebound  Extremities: 1+ pedal edema in right extremity; approx 5 cm black eschar formation on right anterior leg with mild erythema surrounding, tender to light palpation, dorsalis pedis pulses 2+ intact  Vascular: 2+ peripheral pulses  Neurological: A/O x 3, no focal deficits, sensation intact in all extremities  Musculoskeletal: 5/5 strength b/l upper and lower extremities  Skin: pprox 5 cm black eschar formation on right anterior leg with mild erythema surrounding, tender to light palpation,    MEDICATIONS:  MEDICATIONS  (STANDING):  ALPRAZolam 2 milliGRAM(s) Oral two times a day  ascorbic acid 500 milliGRAM(s) Oral daily  clindamycin IVPB      clindamycin IVPB 600 milliGRAM(s) IV Intermittent every 8 hours  diphtheria/tetanus/pertussis (acellular) Vaccine (ADAcel) 0.5 milliLiter(s) IntraMuscular once  donepezil 10 milliGRAM(s) Oral at bedtime  enoxaparin Injectable 40 milliGRAM(s) SubCutaneous daily  levothyroxine 150 MICROGram(s) Oral daily  memantine 10 milliGRAM(s) Oral two times a day  morphine ER Tablet 90 milliGRAM(s) Oral two times a day  multivitamin 1 Tablet(s) Oral daily  nicotine -  14 mG/24Hr(s) Patch 1 patch Transdermal daily  sodium chloride 0.9%. 1000 milliLiter(s) (125 mL/Hr) IV Continuous <Continuous>  zinc sulfate 220 milliGRAM(s) Oral daily    MEDICATIONS  (PRN):  senna 2 Tablet(s) Oral at bedtime PRN Constipation      LABS: All Labs Reviewed:                        11.8   1.4   )-----------( 94       ( 06 Nov 2018 07:15 )             35.6     11-06    140  |  103  |  8   ----------------------------<  121<H>  3.6   |  29  |  0.95    Ca    8.6      06 Nov 2018 07:15    TPro  7.6  /  Alb  3.4  /  TBili  0.7  /  DBili  x   /  AST  38  /  ALT  30  /  AlkPhos  82  11-06    PT/INR - ( 06 Nov 2018 07:15 )   PT: 13.6 sec;   INR: 1.21 ratio    PTT - ( 06 Nov 2018 07:15 )  PTT:41.6 sec      Blood Culture: Pending    RADIOLOGY/EKG: < from: Xray Tibia + Fibula 2 Views, Right (11.06.18 @ 07:37) >  INTERPRETATION:  Left tib-fib. Patient had local injury.    There is no sense of knee effusion.    The knee and ankle are relatively free of degeneration.    No bone destruction or fracture is evident.    IMPRESSION: No acute finding.      DVT PPX: ON Lovenox    DISPOSITION: [facility]ON Unit

## 2018-11-06 NOTE — ED PROVIDER NOTE - PHYSICAL EXAMINATION
General:     NAD, well-nourished, well-appearing  Head:     NC/AT, EOMI, oral mucosa moist  Neck:     trachea midline  Lungs:     CTA b/l, no w/r/r  CVS:     S1S2, RRR, no m/g/r  Abd:     +BS, s/nt/nd, no organomegaly  Ext:    2+ radial and pedal pulses, no c/c/e R LL open wound with dark discoloration scab erythema tender warm around that area, neuro vs R LL intact  Neuro: AAOx3, no sensory/motor deficits

## 2018-11-06 NOTE — CONSULT NOTE ADULT - SUBJECTIVE AND OBJECTIVE BOX
ORLANDO BRUNSON,  68y Female  MRN: [id number]  ATTENDING: Dr.Jenifer Whitten      HPI:  68 female with known depression and anxiety, patient of [facility], under my care since July 2018, with persistent macrocytosis and pancytopenia. Patient admitted with cellulitic rash on right LE, in the setting of absolute neutropenia (ANC 98). Patient started on double antibiotic coverage in ED. Patient declined hematologic workup with bone marrow biopsy/ aspirate; her cytopenias are chronic, but she has been observed off growth factor support. Evaluated by surgery; no debridement necessary.    PAST MEDICAL & SURGICAL HISTORY:  Pancytopenia  Alzheimer disease  Chronic back pain  Hypothyroid  Nasal septal deviation repair; tonsillectomy      OB/GYN: Menopause at 52; no children    MEDICATION:  ALPRAZolam 2 milliGRAM(s) Oral two times a day  ascorbic acid 500 milliGRAM(s) Oral daily     clindamycin IVPB 600 milliGRAM(s) IV Intermittent every 8 hours  diphtheria/tetanus/pertussis (acellular) Vaccine (ADAcel) 0.5 milliLiter(s) IntraMuscular once  donepezil 10 milliGRAM(s) Oral at bedtime  enoxaparin Injectable 40 milliGRAM(s) SubCutaneous daily  levothyroxine 150 MICROGram(s) Oral daily  memantine 10 milliGRAM(s) Oral two times a day  morphine ER Tablet 90 milliGRAM(s) Oral two times a day  multivitamin 1 Tablet(s) Oral daily  nicotine -  14 mG/24Hr(s) Patch 1 patch Transdermal daily  sodium chloride 0.9%. 1000 milliLiter(s) IV Continuous <Continuous>  zinc sulfate 220 milliGRAM(s) Oral daily    ALLERGIES:  Talwin (Unknown)    FAMILY HISTORY:  Paternal- cardiac disease    SOCIAL HISTORY:  Tobacco: YES [ x ]  ; NO [ ]; Former smoker [ ]  Alcohol:   YES [ ]  ; NO [x ]; Social alcohol user [ ]  Occupation/ marital status/ children: ; no children    REVIEW SYSTEMS:  Constitutional: significant weight loss, malnourished  HEENT: no oral thrush, or dysphagia  Respiratory: no dyspnea , + chronic cough  Cardiovascular: denies chest pain, palpitations  GI: no abdominal tenderness / pain; no change in bowel habits  Musculoskeletal: joint pain / swelling  Integumentary: cellulitic rash right lower extremity  Anxious, depressed    VITALS:  T(C): 37, Max: 37 (11-06-18 @ 15:19)  T(F): 98.6, Max: 98.6 (11-06-18 @ 15:19)  HR: 65 (60 - 75)  BP: 114/91 (114/91 - 166/77)  SpO2: 98% (97% - 99%)    PHYSICAL EXAM:  Constitutional: Alert and oriented x 3  Eyes/ ENMT: PERRLA,   Respiratory: clear to auscultation bilaterally  Cardiovascular: S1,S2 rhythmic  Gastrointestinal: no guarding/ rebound  Extremities: no calf tenderness;  peripheral edema  Skin: +cellulitic rash right lower extremity    LABS:  (11-06) WBC: 1.4 K/uL,Hemoglobin: 11.8 g/dL, Hematocrit: 35.6 %,  Platelet: 94 K/uL  (11-06) Na: 140 mmol/L ; K: 3.6 mmol/L ; BUN: 8 mg/dL ; Cr: 0.95 mg/dL.  PT/INR - ( 06 Nov 2018 07:15 )   PT: 13.6 sec;   INR: 1.21 ratio  PTT - ( 06 Nov 2018 07:15 )  PTT:41.6 sec    RADIOLOGY:  Xray Tibia + Fibula 2 Views, Right (11.06.18 @ 07:37) >  EXAM:  LEG AP&LAT RIGHT    PROCEDURE DATE:  11/06/2018    INTERPRETATION:  Left tib-fib. Patient had local injury.  There is no sense of knee effusion.The knee and ankle are relatively free of degeneration.  No bone destruction or fracture is evident.  IMPRESSION: No acute finding.

## 2018-11-06 NOTE — H&P ADULT - NSHPREVIEWOFSYSTEMS_GEN_ALL_CORE
REVIEW OF SYSTEMS:  CONSTITUTIONAL: No fever, endorses clothes are getting looser on her, denies frequent falls  EYES: No eye pain, visual disturbances, or discharge  ENMT:  No difficulty hearing, tinnitus, vertigo; No sinus or throat pain  NECK: No pain or stiffness  BREASTS: No pain, masses, or nipple discharge  RESPIRATORY: No cough, wheezing, chills or hemoptysis; No shortness of breath  CARDIOVASCULAR: No chest pain, palpitations, dizziness, or leg swelling  GASTROINTESTINAL: No abdominal or epigastric pain. No nausea, vomiting, or hematemesis; No diarrhea or constipation. No melena or hematochezia.  GENITOURINARY: No dysuria, frequency, hematuria, or incontinence  NEUROLOGICAL: No headaches, loss of strength, numbness, or tremors  SKIN: No itching, burning, rashes, or lesions   LYMPH NODES: No enlarged glands  ENDOCRINE: No heat or cold intolerance; No hair loss  MUSCULOSKELETAL: No joint pain or swelling; No muscle, back, or extremity pain  PSYCHIATRIC: No depression, mood swings, or difficulty sleeping  Reports great anxiety due to illness  HEME/LYMPH: No easy bruising, or bleeding gums  ALLERGY AND IMMUNOLOGIC: No hives or eczema    ALL ROS REVIEWED AND NORMAL EXCEPT AS STATED ABOVE

## 2018-11-06 NOTE — H&P ADULT - NSHPPHYSICALEXAM_GEN_ALL_CORE
T(C): 36.6 (11-06-18 @ 06:45), Max: 36.6 (11-06-18 @ 06:45)  HR: 75 (11-06-18 @ 06:45) (75 - 75)  BP: 166/77 (11-06-18 @ 06:45) (166/77 - 166/77)  RR: 17 (11-06-18 @ 06:45) (17 - 17)  SpO2: 99% (11-06-18 @ 06:45) (99% - 99%)  Wt(kg): --Vital Signs Last 24 Hrs  T(C): 36.6 (06 Nov 2018 06:45), Max: 36.6 (06 Nov 2018 06:45)  T(F): 97.9 (06 Nov 2018 06:45), Max: 97.9 (06 Nov 2018 06:45)  HR: 75 (06 Nov 2018 06:45) (75 - 75)  BP: 166/77 (06 Nov 2018 06:45) (166/77 - 166/77)  BP(mean): --  RR: 17 (06 Nov 2018 06:45) (17 - 17)  SpO2: 99% (06 Nov 2018 06:45) (99% - 99%)    PHYSICAL EXAM:  GENERAL: NAD, well-groomed, well-developed, under weight  HEAD:  Atraumatic, Normocephalic  EYES: EOMI, PERRLA, conjunctiva and sclera clear  ENMT: No tonsillar erythema, exudates, or enlargement; Moist mucous membranes, Good dentition, No lesions  NECK: Supple, No JVD, Normal thyroid  NERVOUS SYSTEM:  Alert & Oriented X3, Good concentration; Motor Strength 5/5 B/L upper and lower extremities; DTRs 2+ intact and symmetric  CHEST/LUNG: Clear to percussion bilaterally; No rales, rhonchi, wheezing, or rubs  HEART: Regular rate and rhythm; No murmurs, rubs, or gallops  ABDOMEN: Soft, Nontender, Nondistended; Bowel sounds present  EXTREMITIES:  2+ Peripheral Pulses,wound on right leg - eschar base with surrounding erythema   LYMPH: No lymphadenopathy noted  SKIN: No rashes or lesions

## 2018-11-06 NOTE — CONSULT NOTE ADULT - ASSESSMENT
68 female with known depression and anxiety, patient of [facility], under my care since July 2018, with persistent macrocytosis and pancytopenia. Patient admitted with cellulitic rash on right LE, in the setting of absolute neutropenia (ANC 98). Patient started on double antibiotic coverage in ED. Patient declined hematologic workup with bone marrow biopsy/ aspirate; her cytopenias are chronic, but she has been observed off growth factor support. Evaluated by surgery; no debridement necessary.

## 2018-11-06 NOTE — CONSULT NOTE ADULT - SUBJECTIVE AND OBJECTIVE BOX
68 year old with a PMH of hypothyroidism Alzheimer's disease, neutropenia/leukopenia (under the care of Dr. Leon) comes with a wound on right shin that is approximately 2 weeks old and not healing.      patient tells me she hit her right lower leg on an antique metal piece two weeks ago and reinjured recently. Has been self treating. Not a diabetic.      rle-medial based dry eschar 3x4 cm  some surrounding erythema, no evidence of infection    excellent distal(dp) pulse  no calf compartment syndrome  no neurovascular compromise    Complete Blood Count + Automated Diff (11.06.18 @ 07:15)    WBC Count: 1.4 K/uL    RBC Count: 4.11 M/uL    Hemoglobin: 11.8 g/dL    Hematocrit: 35.6 %    Mean Cell Volume: 86.6 fl    Mean Cell Hemoglobin: 28.7 pg    Mean Cell Hemoglobin Conc: 33.2 gm/dL    Red Cell Distrib Width: 12.5 %    Platelet Count - Automated: 94 K/uL    Auto Neutrophil %: 7.0: Differential percentages must be correlated with absolute numbers for  clinical significance. %    Auto Lymphocyte %: 68.0 %    Auto Monocyte %: 17.0 %    Auto Eosinophil %: 3.0 %    Auto Basophil %: 1.0 %

## 2018-11-06 NOTE — ED ADULT NURSE REASSESSMENT NOTE - NS ED NURSE REASSESS COMMENT FT1
Assume care of pt from Brynn Pineda RN. Pt sitting up in stretcher awake and speaking with Dr. Espino at bedside. Pt c/o right shin pain and redness surrounding 2 week old wound from accidentally hitting leg with metal furniture 2 weeks ago and accientally hitting shin again 1 week. Ago. Pt denies fevers.

## 2018-11-06 NOTE — CONSULT NOTE ADULT - PROBLEM SELECTOR RECOMMENDATION 2
Platelet count at 90 K. In ambulatory, platelet count 74K. No need for  transfusion support, especially if surgery is not contemplated. Will continue to monitor CBC.

## 2018-11-06 NOTE — CONSULT NOTE ADULT - REASON FOR ADMISSION
Cellulitic wound with known neutropenia/leukopenia
Cellulitic wound with known neutropenia/leukopenia

## 2018-11-06 NOTE — H&P ADULT - ASSESSMENT
68 year old with a PMH of hypothyroidism Alzheimer's disease, neutropenia/leukopenia (under the care of Dr. Leon) comes with a wound on right shin that is approximately 2 weeks old and not healing.

## 2018-11-06 NOTE — CONSULT NOTE ADULT - ASSESSMENT
a/p    no indication for debridement  patient may ambulate but when not ambulate leg should be elevated      thank you

## 2018-11-06 NOTE — ED ADULT NURSE NOTE - OBJECTIVE STATEMENT
Pt c/o right lower leg wound x2 weeks s/p hitting leg on cast iron coat rack 2 months ago and then hitting wound again 2 weeks ago. Pt presents with scabbed wound to right lower leg with surrounding erythema. Pt is ambulatory. PMS in tact. Pt denies fevers.

## 2018-11-06 NOTE — ED PROVIDER NOTE - MEDICAL DECISION MAKING DETAILS
open wound R leg leading to cellulitis- labs xray R tib fib iv abx tdap open wound R leg leading to cellulitis- labs xray R tib fib iv abx tdap, pt neutrtopenic will be admitted   has a dog at home no one to care , palma  informed

## 2018-11-07 ENCOUNTER — TRANSCRIPTION ENCOUNTER (OUTPATIENT)
Age: 68
End: 2018-11-07

## 2018-11-07 DIAGNOSIS — D61.818 OTHER PANCYTOPENIA: ICD-10-CM

## 2018-11-07 DIAGNOSIS — D64.9 ANEMIA, UNSPECIFIED: ICD-10-CM

## 2018-11-07 LAB
ANION GAP SERPL CALC-SCNC: 6 MMOL/L — SIGNIFICANT CHANGE UP (ref 5–17)
BASOPHILS NFR BLD AUTO: 1 % — SIGNIFICANT CHANGE UP (ref 0–2)
BUN SERPL-MCNC: 12 MG/DL — SIGNIFICANT CHANGE UP (ref 7–23)
CALCIUM SERPL-MCNC: 8.3 MG/DL — LOW (ref 8.4–10.5)
CHLORIDE SERPL-SCNC: 105 MMOL/L — SIGNIFICANT CHANGE UP (ref 96–108)
CO2 SERPL-SCNC: 29 MMOL/L — SIGNIFICANT CHANGE UP (ref 22–31)
CREAT SERPL-MCNC: 0.79 MG/DL — SIGNIFICANT CHANGE UP (ref 0.5–1.3)
EOSINOPHIL NFR BLD AUTO: 4 % — SIGNIFICANT CHANGE UP (ref 0–6)
ERYTHROCYTE [SEDIMENTATION RATE] IN BLOOD: 19 MM/HR — SIGNIFICANT CHANGE UP (ref 0–20)
GLUCOSE SERPL-MCNC: 82 MG/DL — SIGNIFICANT CHANGE UP (ref 70–99)
HCT VFR BLD CALC: 31.2 % — LOW (ref 34.5–45)
HGB BLD-MCNC: 10.2 G/DL — LOW (ref 11.5–15.5)
LYMPHOCYTES # BLD AUTO: 68 % — HIGH (ref 13–44)
MCHC RBC-ENTMCNC: 28.1 PG — SIGNIFICANT CHANGE UP (ref 27–34)
MCHC RBC-ENTMCNC: 32.6 GM/DL — SIGNIFICANT CHANGE UP (ref 32–36)
MCV RBC AUTO: 86 FL — SIGNIFICANT CHANGE UP (ref 80–100)
MONOCYTES NFR BLD AUTO: 18 % — HIGH (ref 2–14)
NEUTROPHILS NFR BLD AUTO: 8 % — LOW (ref 43–77)
PLATELET # BLD AUTO: 86 K/UL — LOW (ref 150–400)
POTASSIUM SERPL-MCNC: 4.1 MMOL/L — SIGNIFICANT CHANGE UP (ref 3.5–5.3)
POTASSIUM SERPL-SCNC: 4.1 MMOL/L — SIGNIFICANT CHANGE UP (ref 3.5–5.3)
RBC # BLD: 3.63 M/UL — LOW (ref 3.8–5.2)
RBC # FLD: 12.2 % — SIGNIFICANT CHANGE UP (ref 10.3–14.5)
SODIUM SERPL-SCNC: 140 MMOL/L — SIGNIFICANT CHANGE UP (ref 135–145)
TSH SERPL-MCNC: 8.16 UIU/ML — HIGH (ref 0.27–4.2)
WBC # BLD: 1.6 K/UL — LOW (ref 3.8–10.5)
WBC # FLD AUTO: 1.6 K/UL — LOW (ref 3.8–10.5)

## 2018-11-07 PROCEDURE — 99232 SBSQ HOSP IP/OBS MODERATE 35: CPT

## 2018-11-07 RX ORDER — HYDROCHLOROTHIAZIDE 25 MG
12.5 TABLET ORAL DAILY
Qty: 0 | Refills: 0 | Status: DISCONTINUED | OUTPATIENT
Start: 2018-11-07 | End: 2018-11-09

## 2018-11-07 RX ADMIN — MEMANTINE HYDROCHLORIDE 10 MILLIGRAM(S): 10 TABLET ORAL at 17:17

## 2018-11-07 RX ADMIN — MEMANTINE HYDROCHLORIDE 10 MILLIGRAM(S): 10 TABLET ORAL at 06:13

## 2018-11-07 RX ADMIN — DONEPEZIL HYDROCHLORIDE 10 MILLIGRAM(S): 10 TABLET, FILM COATED ORAL at 22:40

## 2018-11-07 RX ADMIN — Medication 1 PATCH: at 23:45

## 2018-11-07 RX ADMIN — Medication 2 MILLIGRAM(S): at 17:17

## 2018-11-07 RX ADMIN — MORPHINE SULFATE 90 MILLIGRAM(S): 50 CAPSULE, EXTENDED RELEASE ORAL at 17:45

## 2018-11-07 RX ADMIN — MORPHINE SULFATE 90 MILLIGRAM(S): 50 CAPSULE, EXTENDED RELEASE ORAL at 17:17

## 2018-11-07 RX ADMIN — ZINC SULFATE TAB 220 MG (50 MG ZINC EQUIVALENT) 220 MILLIGRAM(S): 220 (50 ZN) TAB at 11:26

## 2018-11-07 RX ADMIN — Medication 100 MILLIGRAM(S): at 06:13

## 2018-11-07 RX ADMIN — Medication 1 PATCH: at 11:26

## 2018-11-07 RX ADMIN — MORPHINE SULFATE 90 MILLIGRAM(S): 50 CAPSULE, EXTENDED RELEASE ORAL at 07:57

## 2018-11-07 RX ADMIN — Medication 12.5 MILLIGRAM(S): at 17:17

## 2018-11-07 RX ADMIN — MORPHINE SULFATE 90 MILLIGRAM(S): 50 CAPSULE, EXTENDED RELEASE ORAL at 06:14

## 2018-11-07 RX ADMIN — ENOXAPARIN SODIUM 40 MILLIGRAM(S): 100 INJECTION SUBCUTANEOUS at 11:25

## 2018-11-07 RX ADMIN — Medication 1 PATCH: at 07:57

## 2018-11-07 RX ADMIN — Medication 2 MILLIGRAM(S): at 06:13

## 2018-11-07 RX ADMIN — Medication 500 MILLIGRAM(S): at 11:25

## 2018-11-07 RX ADMIN — SODIUM CHLORIDE 125 MILLILITER(S): 9 INJECTION INTRAMUSCULAR; INTRAVENOUS; SUBCUTANEOUS at 17:16

## 2018-11-07 RX ADMIN — Medication 100 MILLIGRAM(S): at 22:40

## 2018-11-07 RX ADMIN — Medication 1 TABLET(S): at 11:26

## 2018-11-07 RX ADMIN — Medication 100 MILLIGRAM(S): at 13:27

## 2018-11-07 RX ADMIN — Medication 150 MICROGRAM(S): at 06:13

## 2018-11-07 NOTE — DISCHARGE NOTE ADULT - HOSPITAL COURSE
67 yo F patient of Dr. Adame admitted on 11/6/18 for right cellulitic wound. Pt hit her right shin against an "old metal arnold piece" on an antique piece of furniture in her home 2 weeks ago. 69 yo F patient of Dr. Adame admitted on 11/6/18 for right cellulitic wound. Pt hit her right shin against an "old metal arnold piece" on an antique piece of furniture in her home 2 weeks ago. Pt was treated with IV Vanco/ IV Zosyn/Clindamycin in the ED. Pt was continued on IV Clindamycin throughout this admission with signs of clinical improvement. 67 yo F patient of Dr. Adame admitted on 11/6/18 for right cellulitic wound. Pt hit her right shin against an "old metal arnold piece" on an antique piece of furniture in her home 2 weeks ago. Pt was treated with IV Vanco/ IV Zosyn/Clindamycin in the ED. Pt was continued on IV Clindamycin throughout this admission with signs of clinical improvement. In addition to primary medical team, patient was seen and assessed by Surgical and Hematology/Oncology consultants. No surgical intervention was indicated and outpatient follow for pancytopenia was recommended. Pt has remained afebrile, hemodynamically stable and is cleared for discharge to home on 11/9/2018. Follow up appointment made with Dr. Adame for next Friday 11/9/18.     Vital Signs Last 24 Hrs  T(C): 36.8 (09 Nov 2018 15:49), Max: 36.8 (09 Nov 2018 15:49)  T(F): 98.2 (09 Nov 2018 15:49), Max: 98.2 (09 Nov 2018 15:49)  HR: 87 (09 Nov 2018 15:49) (56 - 87)  BP: 186/101 (09 Nov 2018 15:49) (150/78 - 186/101)  RR: 15 (09 Nov 2018 15:49) (15 - 16)  SpO2: 99% (09 Nov 2018 15:49) (95% - 99%)    DISCHARGE LABS                       11.4   1.3   )-----------( 106      ( 09 Nov 2018 06:30 )             34.0     09 Nov 2018 06:30    139    |  105    |  12     ----------------------------<  91     3.7     |  28     |  0.70     Ca    8.2        09 Nov 2018 06:30

## 2018-11-07 NOTE — PROGRESS NOTE ADULT - SUBJECTIVE AND OBJECTIVE BOX
CHIEF COMPLAINT: Right Lower leg Injury/Pain    SUBJECTIVE:   HPI:  Hospital Day #2 for this 67 yo F patient of Dr. Adame admitted on 11/6/18 for right cellulitic wound. Pt hit her right shin against an "old metal arnold piece" on an antique piece of furniture in her home 2 weeks ago. Pt describes that wound initially started to heal but she re-traumatized region by hitting the exact same region against metal piece approx 5 days following initial incident. She became concerned as the area around  wound became very red, painful, tender to touch with black scab formation over wound. She denies any fevers, chills, or loss of circulation/sensation in her extremities. Pt describes depressed mood, decreased appetite with associated unintentional weight loss over the past year (estimates 10-15 pound loss in the last 6 months). She  feels lonely living at home with her dog after her  of 35 years passed away about 5 years ago. Pt is a current 1ppd smoker and has smoked for the last 50 years. She has a history of chronic back pain for the last 18 years for which she is prescribed morphine and Xanax from pain management physician.  Pt also has history of neutropenia/leukopenia that is being addressed by Dr. Gloria (Heme/Onc). Pt has dementia (diagnosed in 2002), which she takes Aricept and Namenda for.     In the ED, she was given IV Vancomycin, IV Zosyn, IV Clindamycin and started on IV NaCL @125mL/hr. She was afebrile on triage and has remained afebrile to present. All other vitals are within normal limits.     11/7: Pt was    REVIEW OF SYSTEMS:  CONSTITUTIONAL: +Weight loss  No fevers or chills  RESPIRATORY: No cough, wheezing, hemoptysis; No shortness of breath  CARDIOVASCULAR: No chest pain or palpitations  GASTROINTESTINAL: No abdominal or epigastric pain. No nausea, vomiting, or hematemesis; No diarrhea or constipation. GENITOURINARY: No dysuria, frequency or hematuria  NEUROLOGICAL: +Chronic Back Pain; +Forgetful (diagnosed with dementia) No numbness or weakness  SKIN: + Right leg wound with pain   PSYCH: +Depressed Mood; +Anorexia; +Anhedonia  All other review of systems is negative unless indicated above    Vital Signs Last 24 Hrs  T(C): 36.9 (07 Nov 2018 07:01), Max: 37 (06 Nov 2018 15:19)  T(F): 98.5 (07 Nov 2018 07:01), Max: 98.6 (06 Nov 2018 15:19)  HR: 57 (07 Nov 2018 07:01) (54 - 72)  BP: 121/67 (07 Nov 2018 07:01) (114/91 - 155/68)  RR: 16 (07 Nov 2018 07:01) (15 - 19)  SpO2: 95% (07 Nov 2018 07:01) (95% - 98%)    PHYSICAL EXAM:  Constitutional: frail cachectic woman in mild distress due to pain, awake and alert  HEENT: PERR, EOMI, Normal Hearing  Neck: Soft and supple, No LAD, No JVD  Respiratory: Breath sounds are clear bilaterally, No wheezing, rales or rhonchi  Cardiovascular: S1 and S2, regular rate and rhythm, no Murmurs, gallops or rubs  Gastrointestinal: Bowel Sounds present, soft, nontender, nondistended, no guarding, no rebound  Extremities: 1+ pedal edema in right extremity; approx 5 cm black eschar formation on right anterior leg with mild erythema surrounding, tender to light palpation, dorsalis pedis pulses 2+ intact  Vascular: 2+ peripheral pulses  Neurological: A/O x 3, no focal deficits, sensation intact in all extremities  Musculoskeletal: 5/5 strength b/l upper and lower extremities  Skin: pprox 5 cm black eschar formation on right anterior leg with mild erythema surrounding, tender to light palpation,    MEDICATIONS:  MEDICATIONS  (STANDING):  ALPRAZolam 2 milliGRAM(s) Oral two times a day  ascorbic acid 500 milliGRAM(s) Oral daily  clindamycin IVPB      clindamycin IVPB 600 milliGRAM(s) IV Intermittent every 8 hours  diphtheria/tetanus/pertussis (acellular) Vaccine (ADAcel) 0.5 milliLiter(s) IntraMuscular once  donepezil 10 milliGRAM(s) Oral at bedtime  enoxaparin Injectable 40 milliGRAM(s) SubCutaneous daily  levothyroxine 150 MICROGram(s) Oral daily  memantine 10 milliGRAM(s) Oral two times a day  morphine ER Tablet 90 milliGRAM(s) Oral two times a day  multivitamin 1 Tablet(s) Oral daily  nicotine -  14 mG/24Hr(s) Patch 1 patch Transdermal daily  sodium chloride 0.9%. 1000 milliLiter(s) (125 mL/Hr) IV Continuous <Continuous>  zinc sulfate 220 milliGRAM(s) Oral daily    MEDICATIONS  (PRN):  senna 2 Tablet(s) Oral at bedtime PRN Constipation      LABS: All Labs Reviewed:                                     10.2   1.6   )-----------( 86       ( 07 Nov 2018 06:19 )             31.2     07 Nov 2018 06:19    140    |  105    |  12     ----------------------------<  82     4.1     |  29     |  0.79     Ca    8.3        07 Nov 2018 06:19    TPro  7.6    /  Alb  3.4    /  TBili  0.7    /  DBili  x      /  AST  38     /  ALT  30     /  AlkPhos  82     06 Nov 2018 07:15    PT/INR - ( 06 Nov 2018 07:15 )   PT: 13.6 sec;   INR: 1.21 ratio    PTT - ( 06 Nov 2018 07:15 )  PTT:41.6 sec      LIVER FUNCTIONS - ( 06 Nov 2018 07:15 )  Alb: 3.4 g/dL / Pro: 7.6 g/dL / ALK PHOS: 82 U/L / ALT: 30 U/L DA / AST: 38 U/L / GGT: x           Blood Culture: Pending    RADIOLOGY/EKG: < from: Xray Tibia + Fibula 2 Views, Right (11.06.18 @ 07:37) >  INTERPRETATION:  Left tib-fib. Patient had local injury.    There is no sense of knee effusion.    The knee and ankle are relatively free of degeneration.    No bone destruction or fracture is evident.    IMPRESSION: No acute finding.      DVT PPX: ON Lovenox    DISPOSITION: [facility] Unit CHIEF COMPLAINT: Right Lower leg Injury/Pain    SUBJECTIVE:   HPI:  Hospital Day #2 for this 67 yo F patient of Dr. Adame admitted on 11/6/18 for right cellulitic wound. Pt hit her right shin against an "old metal arnold piece" on an antique piece of furniture in her home 2 weeks ago. Pt describes that wound initially started to heal but she re-traumatized region by hitting the exact same region against metal piece approx 5 days following initial incident. She became concerned as the area around  wound became very red, painful, tender to touch with black scab formation over wound. She denies any fevers, chills, or loss of circulation/sensation in her extremities. Pt describes depressed mood, decreased appetite with associated unintentional weight loss over the past year (estimates 10-15 pound loss in the last 6 months). She  feels lonely living at home with her dog after her  of 35 years passed away about 5 years ago. Pt is a current 1ppd smoker and has smoked for the last 50 years. She has a history of chronic back pain for the last 18 years for which she is prescribed morphine and Xanax from pain management physician.  Pt also has history of neutropenia/leukopenia that is being addressed by Dr. Gloria (Heme/Onc). Pt has dementia (diagnosed in 2002), which she takes Aricept and Namenda for.     In the ED, she was given IV Vancomycin, IV Zosyn, IV Clindamycin and started on IV NaCL @125mL/hr. She was afebrile on triage and has remained afebrile to present. All other vitals are within normal limits.     11/7: Pt was seen and examined at bedside. She did not sleep well last night because she woke up multiple times due to pain and tenderness of right leg. She denies any fevers, chills, nausea, diarrhea or constipation.     REVIEW OF SYSTEMS:  CONSTITUTIONAL: +Weight loss  No fevers or chills  RESPIRATORY: No cough, wheezing, hemoptysis; No shortness of breath  CARDIOVASCULAR: No chest pain or palpitations  GASTROINTESTINAL: No abdominal or epigastric pain. No nausea, vomiting, or hematemesis; No diarrhea or constipation. GENITOURINARY: No dysuria, frequency or hematuria  NEUROLOGICAL: +Chronic Back Pain; +Forgetful (diagnosed with dementia) No numbness or weakness  SKIN: + Right leg wound with pain   PSYCH: +Depressed Mood; +Anorexia; +Anhedonia  All other review of systems is negative unless indicated above    Vital Signs Last 24 Hrs  T(C): 36.9 (07 Nov 2018 07:01), Max: 37 (06 Nov 2018 15:19)  T(F): 98.5 (07 Nov 2018 07:01), Max: 98.6 (06 Nov 2018 15:19)  HR: 57 (07 Nov 2018 07:01) (54 - 72)  BP: 121/67 (07 Nov 2018 07:01) (114/91 - 155/68)  RR: 16 (07 Nov 2018 07:01) (15 - 19)  SpO2: 95% (07 Nov 2018 07:01) (95% - 98%)    PHYSICAL EXAM:  Constitutional: frail cachectic woman in mild distress due to pain, awake and alert  HEENT: PERR, EOMI, Normal Hearing  Neck: Soft and supple, No LAD, No JVD  Respiratory: Breath sounds are clear bilaterally, No wheezing, rales or rhonchi  Cardiovascular: S1 and S2, regular rate and rhythm, no Murmurs, gallops or rubs  Gastrointestinal: Bowel Sounds present, soft, nontender, nondistended, no guarding, no rebound  Extremities: 1+ pedal edema in right extremity; approx 5 cm black eschar formation on right anterior leg with mild erythema surrounding, tender to light palpation, dorsalis pedis pulses 2+ intact  Vascular: 2+ peripheral pulses  Neurological: A/O x 3, no focal deficits, sensation intact in all extremities  Musculoskeletal: 5/5 strength b/l upper and lower extremities  Skin: pprox 5 cm black eschar formation on right anterior leg with mild erythema surrounding, tender to light palpation,    MEDICATIONS:  MEDICATIONS  (STANDING):  ALPRAZolam 2 milliGRAM(s) Oral two times a day  ascorbic acid 500 milliGRAM(s) Oral daily  clindamycin IVPB      clindamycin IVPB 600 milliGRAM(s) IV Intermittent every 8 hours  diphtheria/tetanus/pertussis (acellular) Vaccine (ADAcel) 0.5 milliLiter(s) IntraMuscular once  donepezil 10 milliGRAM(s) Oral at bedtime  enoxaparin Injectable 40 milliGRAM(s) SubCutaneous daily  levothyroxine 150 MICROGram(s) Oral daily  memantine 10 milliGRAM(s) Oral two times a day  morphine ER Tablet 90 milliGRAM(s) Oral two times a day  multivitamin 1 Tablet(s) Oral daily  nicotine -  14 mG/24Hr(s) Patch 1 patch Transdermal daily  sodium chloride 0.9%. 1000 milliLiter(s) (125 mL/Hr) IV Continuous <Continuous>  zinc sulfate 220 milliGRAM(s) Oral daily    MEDICATIONS  (PRN):  senna 2 Tablet(s) Oral at bedtime PRN Constipation      LABS: All Labs Reviewed:                                     10.2   1.6   )-----------( 86       ( 07 Nov 2018 06:19 )             31.2     07 Nov 2018 06:19    140    |  105    |  12     ----------------------------<  82     4.1     |  29     |  0.79     Ca    8.3        07 Nov 2018 06:19    TPro  7.6    /  Alb  3.4    /  TBili  0.7    /  DBili  x      /  AST  38     /  ALT  30     /  AlkPhos  82     06 Nov 2018 07:15    PT/INR - ( 06 Nov 2018 07:15 )   PT: 13.6 sec;   INR: 1.21 ratio    PTT - ( 06 Nov 2018 07:15 )  PTT:41.6 sec      LIVER FUNCTIONS - ( 06 Nov 2018 07:15 )  Alb: 3.4 g/dL / Pro: 7.6 g/dL / ALK PHOS: 82 U/L / ALT: 30 U/L DA / AST: 38 U/L / GGT: x           Blood Culture: Pending    RADIOLOGY/EKG: < from: Xray Tibia + Fibula 2 Views, Right (11.06.18 @ 07:37) >  INTERPRETATION:  Left tib-fib. Patient had local injury.    There is no sense of knee effusion.    The knee and ankle are relatively free of degeneration.    No bone destruction or fracture is evident.    IMPRESSION: No acute finding.      DVT PPX: ON Lovenox    DISPOSITION: [facility] Unit CHIEF COMPLAINT: Right Lower leg Injury/Pain    SUBJECTIVE:   HPI:  Hospital Day #2 for this 69 yo F patient of Dr. Adame admitted on 11/6/18 for right cellulitic wound. Pt hit her right shin against an "old metal arnold piece" on an antique piece of furniture in her home 2 weeks ago. Pt describes that wound initially started to heal but she re-traumatized region by hitting the exact same region against metal piece approx 5 days following initial incident. She became concerned as the area around  wound became very red, painful, tender to touch with black scab formation over wound. She denies any fevers, chills, or loss of circulation/sensation in her extremities. Pt describes depressed mood, decreased appetite with associated unintentional weight loss over the past year (estimates 10-15 pound loss in the last 6 months). She  feels lonely living at home with her dog after her  of 35 years passed away about 5 years ago. Pt is a current 1ppd smoker and has smoked for the last 50 years. She has a history of chronic back pain for the last 18 years for which she is prescribed morphine and Xanax from pain management physician.  Pt also has history of neutropenia/leukopenia that is being addressed by Dr. Gloria (Heme/Onc). Pt has dementia (diagnosed in 2002), which she takes Aricept and Namenda for.     In the ED, she was given IV Vancomycin, IV Zosyn, IV Clindamycin and started on IV NaCL @125mL/hr. She was afebrile on triage and has remained afebrile to present. All other vitals are within normal limits.     11/7: Pt was seen and examined at bedside. She did not sleep well last night because she woke up multiple times due to pain and tenderness of right leg. She denies any fevers, chills, nausea, diarrhea or constipation.     REVIEW OF SYSTEMS:  CONSTITUTIONAL: +Weight loss  No fevers or chills  RESPIRATORY: No cough, wheezing, hemoptysis; No shortness of breath  CARDIOVASCULAR: No chest pain or palpitations  GASTROINTESTINAL: No abdominal or epigastric pain. No nausea, vomiting, or hematemesis; No diarrhea or constipation. GENITOURINARY: No dysuria, frequency or hematuria  NEUROLOGICAL: +Chronic Back Pain; +Forgetful (diagnosed with dementia) No numbness or weakness  SKIN: + Right leg wound with pain   PSYCH: +Depressed Mood; +Anorexia; +Anhedonia  All other review of systems is negative unless indicated above    Vital Signs Last 24 Hrs  T(C): 36.9 (07 Nov 2018 07:01), Max: 37 (06 Nov 2018 15:19)  T(F): 98.5 (07 Nov 2018 07:01), Max: 98.6 (06 Nov 2018 15:19)  HR: 57 (07 Nov 2018 07:01) (54 - 72)  BP: 121/67 (07 Nov 2018 07:01) (114/91 - 155/68)  RR: 16 (07 Nov 2018 07:01) (15 - 19)  SpO2: 95% (07 Nov 2018 07:01) (95% - 98%)    PHYSICAL EXAM:  Constitutional: frail cachectic woman in mild distress due to pain, awake and alert  HEENT: PERR, EOMI, Normal Hearing  Neck: Soft and supple, No LAD, No JVD  Respiratory: Breath sounds are clear bilaterally, No wheezing, rales or rhonchi  Cardiovascular: S1 and S2, Bradycardic, no Murmurs, gallops or rubs  Gastrointestinal: Bowel Sounds present, soft, nontender, nondistended, no guarding, no rebound  Extremities: 1+ pedal edema in right extremity; approx 5 cm black eschar formation on right anterior leg with mild erythema surrounding, tender to light palpation, erythema reduced from yesterday dorsalis pedis pulses 2+ intact  Vascular: 2+ peripheral pulses  Neurological: A/O x 3, no focal deficits, sensation intact in all extremities  Musculoskeletal: 5/5 strength b/l upper and lower extremities  Skin: pprox 5 cm black eschar formation on right anterior leg with mild erythema surrounding, tender to light palpation, erythema reduced from yesterday    MEDICATIONS:  MEDICATIONS  (STANDING):  ALPRAZolam 2 milliGRAM(s) Oral two times a day  ascorbic acid 500 milliGRAM(s) Oral daily  clindamycin IVPB      clindamycin IVPB 600 milliGRAM(s) IV Intermittent every 8 hours  diphtheria/tetanus/pertussis (acellular) Vaccine (ADAcel) 0.5 milliLiter(s) IntraMuscular once  donepezil 10 milliGRAM(s) Oral at bedtime  enoxaparin Injectable 40 milliGRAM(s) SubCutaneous daily  levothyroxine 150 MICROGram(s) Oral daily  memantine 10 milliGRAM(s) Oral two times a day  morphine ER Tablet 90 milliGRAM(s) Oral two times a day  multivitamin 1 Tablet(s) Oral daily  nicotine -  14 mG/24Hr(s) Patch 1 patch Transdermal daily  sodium chloride 0.9%. 1000 milliLiter(s) (125 mL/Hr) IV Continuous <Continuous>  zinc sulfate 220 milliGRAM(s) Oral daily    MEDICATIONS  (PRN):  senna 2 Tablet(s) Oral at bedtime PRN Constipation      LABS: All Labs Reviewed:                                     10.2   1.6   )-----------( 86       ( 07 Nov 2018 06:19 )             31.2     07 Nov 2018 06:19    140    |  105    |  12     ----------------------------<  82     4.1     |  29     |  0.79     Ca    8.3        07 Nov 2018 06:19    TPro  7.6    /  Alb  3.4    /  TBili  0.7    /  DBili  x      /  AST  38     /  ALT  30     /  AlkPhos  82     06 Nov 2018 07:15    PT/INR - ( 06 Nov 2018 07:15 )   PT: 13.6 sec;   INR: 1.21 ratio    PTT - ( 06 Nov 2018 07:15 )  PTT:41.6 sec      LIVER FUNCTIONS - ( 06 Nov 2018 07:15 )  Alb: 3.4 g/dL / Pro: 7.6 g/dL / ALK PHOS: 82 U/L / ALT: 30 U/L DA / AST: 38 U/L / GGT: x           Blood Culture: Pending    RADIOLOGY/EKG: < from: Xray Tibia + Fibula 2 Views, Right (11.06.18 @ 07:37) >  INTERPRETATION:  Left tib-fib. Patient had local injury.    There is no sense of knee effusion.    The knee and ankle are relatively free of degeneration.    No bone destruction or fracture is evident.    IMPRESSION: No acute finding.      DVT PPX: ON Lovenox    DISPOSITION: [facility] Unit

## 2018-11-07 NOTE — DISCHARGE NOTE ADULT - ADDITIONAL INSTRUCTIONS
- Please follow up with your Primary Care physician Dr. Adame next Friday November 16th 2018 at 11:30AM in [facility]. - Please follow up with your Primary Care physician Dr. Adame next Friday November 16th 2018 at 11:30AM in [facility]. Please bring this document with you to your next appointment.

## 2018-11-07 NOTE — PROGRESS NOTE ADULT - SUBJECTIVE AND OBJECTIVE BOX
ORLANDO BRUNSON   68y   Female    Admitting: WAYNE Whitten  HPI:  68-year-old woman with history of depression/anxiety and pancytopenia, who was admitted with a cellulitic rash/wound on her right lower extremity, following recurrent trauma to the area with first trauma approximately 2 weeks PTA. Patient begun on IV antibiotics. Patient has declined hematologic workup, including bone marrow aspirate/biopsy (hematologist-Dr. Gloria).    PAST MEDICAL & SURGICAL HISTORY:  Alzheimer disease  Chronic back pain  Hypothyroid  No significant past surgical history    HEALTH ISSUES - PROBLEM Dx:  Thrombocytopenia: Thrombocytopenia  Prophylactic measure: Prophylactic measure  Smoker: Smoker  Malnutrition: Malnutrition  Neutropenia, unspecified type: Neutropenia, unspecified type  Early onset Alzheimer's dementia without behavioral disturbance: Early onset Alzheimer&#x27;s dementia without behavioral disturbance  Acquired hypothyroidism: Acquired hypothyroidism  Chronic bilateral low back pain, with sciatica presence unspecified: Chronic bilateral low back pain, with sciatica presence unspecified  Cellulitis of leg without foot, right: Cellulitis of leg without foot, right    MEDICATIONS  (STANDING):  ALPRAZolam 2 milliGRAM(s) Oral two times a day  ascorbic acid 500 milliGRAM(s) Oral daily  clindamycin IVPB 600 milliGRAM(s) IV Intermittent every 8 hours  clindamycin IVPB      diphtheria/tetanus/pertussis (acellular) Vaccine (ADAcel) 0.5 milliLiter(s) IntraMuscular once  donepezil 10 milliGRAM(s) Oral at bedtime  enoxaparin Injectable 40 milliGRAM(s) SubCutaneous daily  levothyroxine 150 MICROGram(s) Oral daily  memantine 10 milliGRAM(s) Oral two times a day  morphine ER Tablet 90 milliGRAM(s) Oral two times a day  multivitamin 1 Tablet(s) Oral daily  nicotine -  14 mG/24Hr(s) Patch 1 patch Transdermal daily  sodium chloride 0.9%. 1000 milliLiter(s) (125 mL/Hr) IV Continuous <Continuous>  zinc sulfate 220 milliGRAM(s) Oral daily    MEDICATIONS  (PRN):  senna 2 Tablet(s) Oral at bedtime PRN Constipation    Allergies    Talwin (Unknown)    INTERVAL HPI/OVERNIGHT EVENTS:  Patient S&E at bedside. No c/o CP or SOB. +RLE wound discomfort.     VITAL SIGNS:  T(F): 98.5 (11-07-18 @ 07:01)  HR: 57 (11-07-18 @ 07:01)  BP: 121/67 (11-07-18 @ 07:01)  RR: 16 (11-07-18 @ 07:01)  SpO2: 95% (11-07-18 @ 07:01)    PHYSICAL EXAM:  Constitutional: NAD; non-toxic appearing  Eyes: sclera non-icteric  Neck: no JVD  Respiratory: CTA b/l, good air entry b/l ant.  Cardiovascular: RRR, no M/R/G  Gastrointestinal: soft, NTND, +BS  Extremities: no calf tenderness; +right pre-tibial eschar with surrounding erythema  Neurological: Awake, alert.    Labs:             10.2   1.6   )-----------( 86       ( 11-07 @ 06:19 )             31.2                11.8   1.4   )-----------( 94       ( 11-06 @ 07:15 )             35.6       11-07    140  |  105  |  12  ----------------------------<  82  4.1   |  29  |  0.79    TPro  7.6  /  Alb  3.4  /  TBili  0.7  /  DBili  x   /  AST  38  /  ALT  30  /  AlkPhos  82  11-06    PT/INR - ( 06 Nov 2018 07:15 )   PT: 13.6 sec;   INR: 1.21 ratio    PTT - ( 06 Nov 2018 07:15 )  PTT:41.6 sec    RADIOLOGY & ADDITIONAL TESTS:  < from: Xray Tibia + Fibula 2 Views, Right (11.06.18 @ 07:37) >    EXAM:  LEG AP&LAT RIGHT      PROCEDURE DATE:  11/06/2018        INTERPRETATION:  Left tib-fib. Patient had local injury.    There is no sense of knee effusion.    The knee and ankle are relatively free of degeneration.    No bone destruction or fracture is evident.    IMPRESSION: No acute finding.    ARLET RODRIGUEZ M.D., ATTENDING RADIOLOGIST  This document has been electronically signed. Nov 6 2018  8:13AM        < end of copied text >

## 2018-11-07 NOTE — DIETITIAN INITIAL EVALUATION ADULT. - PROBLEM SELECTOR PLAN 1
Dr. Sun consulted for [facility]  ED gave one dose vanc/zosyn  Will switch to clindamycin

## 2018-11-07 NOTE — PROGRESS NOTE ADULT - PROBLEM SELECTOR PLAN 1
Patient with pancytopenia-suspected underlying bone marrow disorder, which was discussed with her, along with further evaluation recommended including bone marrow procedure-patient expressed her understanding and does not consent to bone marrow evaluation. Explained that hematologic situation may not be as treatable in the future, with potential continued complications. Patient's questions answered. At this time, supportive hematologic care. Check B 12, folate, iron studies.

## 2018-11-07 NOTE — PROGRESS NOTE ADULT - PROBLEM SELECTOR PLAN 6
- Ensure Enlive TID with meals  - Vitamin C and zinc supplementation for wound healing  - F/U Nutrition consult - WBC 1.6K with 8% Neutrophils and 1% Bands  -   - Heme Onc consult appreciated: Pt refused bone marrow aspiration  - F/U CBC in AM  - Outpatient Heme/Onc (Dr. Gloria)

## 2018-11-07 NOTE — CHART NOTE - NSCHARTNOTEFT_GEN_A_CORE
Upon Nutritional Assessment by the Registered Dietitian your patient was determined to meet criteria / has evidence of the following diagnosis/diagnoses:          [ ]  Mild Protein Calorie Malnutrition        [ ]  Moderate Protein Calorie Malnutrition        [ X] Severe Protein Calorie Malnutrition        [ ] Unspecified Protein Calorie Malnutrition        [X ] Underweight / BMI <19        [ ] Morbid Obesity / BMI > 40      Findings as based on:  [X ] Comprehensive nutrition assessment   [X ] Nutrition Focused Physical Exam  [ X] Other: MUSCLE WASTING LOSS BODY FAT 10 LB. WEIGHT LOSS RECENTLY       Nutrition Plan/Recommendations: C ontinue regular diet w/ ensure enlive tid.    PROVIDER Section:     By signing this assessment you are acknowledging and agree with the diagnosis/diagnoses assigned by the Registered Dietitian    Comments:

## 2018-11-07 NOTE — PROGRESS NOTE ADULT - PROBLEM SELECTOR PLAN 8
DVT PPx: On Lovenox - Current 1 PPD smoker  - Nicotine patch replacement  - Smoking cessation counseling

## 2018-11-07 NOTE — PROGRESS NOTE ADULT - PROBLEM SELECTOR PLAN 1
- IV Clindamycin TID (Day #2)  - s/p IV Vanco/Zosyn in ED  - Elevate right leg  - Warm compresses  - Ambulate as tolerated  - Surgery consult appreciated: No indication for debridement or surgical intervention at this time.  - F/U Blood Cultures

## 2018-11-07 NOTE — DISCHARGE NOTE ADULT - CARE PLAN
Principal Discharge DX:	Cellulitis of leg without foot, right  Secondary Diagnosis:	Malnutrition  Secondary Diagnosis:	Neutropenia, unspecified type  Secondary Diagnosis:	Acquired hypothyroidism  Secondary Diagnosis:	Alzheimer disease  Secondary Diagnosis:	Smoker Principal Discharge DX:	Cellulitis of leg without foot, right  Secondary Diagnosis:	Malnutrition  Secondary Diagnosis:	Neutropenia, unspecified type  Secondary Diagnosis:	Acquired hypothyroidism  Secondary Diagnosis:	Alzheimer disease  Assessment and plan of treatment:	- Please continue to take Aricept and Namenda as prescribed.  Secondary Diagnosis:	Smoker  Assessment and plan of treatment:	- As discussed, we strongly advise against smoking cigarettes due to its negative effect on your health.     - Nicotine patch has been sent to your pharma Principal Discharge DX:	Cellulitis of leg without foot, right  Goal:	To resolve infection  Assessment and plan of treatment:	You were admitted to the hospital for cellulitis (inflammation of skin and underlying layers) surrounding the wound on your right shin.    - You response to the IV Antibiotics during this admission. Please continue to take oral antibiotics [cefuroxime(Ceftin)] twice daily for 6 more days.    - If you develop fever, severe chills or worsening pain and/or redness in the area of wound, please return to the Emergency Dept for re- evaluation.    - Please follow up with Dr. Adame next Friday November 16th at 11:30 AM for follow up of this.  Secondary Diagnosis:	Malnutrition  Assessment and plan of treatment:	Please continue to drink Ensure shakes with each meal daily to help you regain your weight and maintain nutrition.    Please eat a well, balanced diet    - Please check your weight routinely to assess your progress    - Please follow up with your primary care physician.  Secondary Diagnosis:	Neutropenia, unspecified type  Assessment and plan of treatment:	You have had a low white blood cell count for an extended period of time. There may possibly be an abnormality involving your bone marrow. Further investigation/workup was offered in the past but you declined.     - You have been evaluated by Hematology/Oncology prior to and during this admission for this matter as well as anemia and low platelet count as well.    - Please continue to follow up with Dr. Gloria in her clinic.  Secondary Diagnosis:	Acquired hypothyroidism  Assessment and plan of treatment:	TSH was slightly elevated during this admission to 8.16    - Please continue to take Synthroid as prescribed.    - Please follow up with your physician for thyroid function tests and adjustment of Synthroid if necessary.  Secondary Diagnosis:	Alzheimer disease  Assessment and plan of treatment:	- Please continue to take Aricept and Namenda as prescribed.  Secondary Diagnosis:	Smoker  Assessment and plan of treatment:	- As discussed, we strongly advise against smoking cigarettes due to its negative effect on your health.     - Nicotine patch has been sent to your pharmacy of choice - [facility] on [address] to assist you in journey on quitting.

## 2018-11-07 NOTE — DISCHARGE NOTE ADULT - MEDICATION SUMMARY - MEDICATIONS TO TAKE
I will START or STAY ON the medications listed below when I get home from the hospital:    morphine 12 hour extended release  -- 60 mg in morning and 30 at night for a total of 90mg daily  -- Indication: For Chronic back pain    ALPRAZolam 2 mg oral tablet  -- 1 tab(s) by mouth 2 times a day  -- Indication: For Early onset Alzheimer's dementia without behavioral disturbance    cefuroxime 500 mg oral tablet  -- 1 tab(s) by mouth 2 times a day   -- Finish all this medication unless otherwise directed by prescriber.  Medication should be taken with plenty of water.  Take with food or milk.    -- Indication: For Cellulitis of right lower extremity    donepezil 10 mg oral tablet  -- 1 tab(s) by mouth once a day (at bedtime)  -- Indication: For Early onset Alzheimer's dementia without behavioral disturbance    hydroCHLOROthiazide 12.5 mg oral capsule  -- 1 cap(s) by mouth once a day  -- Indication: For Hypertension    senna oral tablet  -- 2 tab(s) by mouth once a day (at bedtime), As needed, Constipation  -- Indication: For Constipation    zinc sulfate 220 mg oral capsule  -- 1 cap(s) by mouth once a day  -- Indication: For Wound healing    Namenda 10 mg oral tablet  -- 1 tab(s) by mouth 2 times a day  -- Indication: For Early onset Alzheimer's dementia without behavioral disturbance    nicotine 14 mg/24 hr transdermal film, extended release  -- 1 patch by transdermal patch once a day   -- Indication: For Smoking cessation    levothyroxine 150 mcg (0.15 mg) oral tablet  -- 1 tab(s) by mouth once a day  -- Indication: For Hypothyroid    ascorbic acid 500 mg oral tablet  -- 1 tab(s) by mouth once a day  -- Indication: For Wound healing

## 2018-11-07 NOTE — DISCHARGE NOTE ADULT - PROVIDER TOKENS
FREE:[LAST:[Deep],FIRST:[Gypsy],PHONE:[phone]],FAX:[(   )    -],ADDRESS:[[address] [place] [address]]]

## 2018-11-07 NOTE — DIETITIAN INITIAL EVALUATION ADULT. - OTHER INFO
Pt. reports much better appetite, 100 % meal completion. Tolerates regular diet w/ ensure enlive tid. Unable to purchase ensure due to high price. Advised alternatives to ensure. Skin intact. Last BM was 11/5/18. Denies N/V/DIARRHEA. dENIES ANY DYSPHAGIA.  Severe malnutrtition noted

## 2018-11-07 NOTE — DISCHARGE NOTE ADULT - PATIENT PORTAL LINK FT
You can access the [facility] Patient Portal, offered by [facility], by registering with the following website: [email or web address]

## 2018-11-07 NOTE — DISCHARGE NOTE ADULT - PLAN OF CARE
- Please continue to take Aricept and Namenda as prescribed. - As discussed, we strongly advise against smoking cigarettes due to its negative effect on your health.     - Nicotine patch has been sent to your pharma To resolve infection You were admitted to the hospital for cellulitis (inflammation of skin and underlying layers) surrounding the wound on your right shin.    - You response to the IV Antibiotics during this admission. Please continue to take oral antibiotics [cefuroxime(Ceftin)] twice daily for 6 more days.    - If you develop fever, severe chills or worsening pain and/or redness in the area of wound, please return to the Emergency Dept for re- evaluation.    - Please follow up with Dr. Adame next Friday November 16th at 11:30 AM for follow up of this. Please continue to drink Ensure shakes with each meal daily to help you regain your weight and maintain nutrition.    Please eat a well, balanced diet    - Please check your weight routinely to assess your progress    - Please follow up with your primary care physician. You have had a low white blood cell count for an extended period of time. There may possibly be an abnormality involving your bone marrow. Further investigation/workup was offered in the past but you declined.     - You have been evaluated by Hematology/Oncology prior to and during this admission for this matter as well as anemia and low platelet count as well.    - Please continue to follow up with Dr. Gloria in her clinic. TSH was slightly elevated during this admission to 8.16    - Please continue to take Synthroid as prescribed.    - Please follow up with your physician for thyroid function tests and adjustment of Synthroid if necessary. - As discussed, we strongly advise against smoking cigarettes due to its negative effect on your health.     - Nicotine patch has been sent to your pharmacy of choice - [facility] on [address] to assist you in journey on quitting.

## 2018-11-07 NOTE — PROGRESS NOTE ADULT - PROBLEM SELECTOR PLAN 7
- Current 1 PPD smoker  - Nicotine patch replacement  - Smoking cessation counseling - Ensure Enlive TID with meals  - Vitamin C and zinc supplementation for wound healing  - F/U Nutrition consult

## 2018-11-08 LAB
ANION GAP SERPL CALC-SCNC: 8 MMOL/L — SIGNIFICANT CHANGE UP (ref 5–17)
BUN SERPL-MCNC: 10 MG/DL — SIGNIFICANT CHANGE UP (ref 7–23)
CALCIUM SERPL-MCNC: 8.4 MG/DL — SIGNIFICANT CHANGE UP (ref 8.4–10.5)
CHLORIDE SERPL-SCNC: 104 MMOL/L — SIGNIFICANT CHANGE UP (ref 96–108)
CO2 SERPL-SCNC: 26 MMOL/L — SIGNIFICANT CHANGE UP (ref 22–31)
CREAT SERPL-MCNC: 0.78 MG/DL — SIGNIFICANT CHANGE UP (ref 0.5–1.3)
FERRITIN SERPL-MCNC: 141 NG/ML — SIGNIFICANT CHANGE UP (ref 15–150)
FOLATE SERPL-MCNC: 18.6 NG/ML — SIGNIFICANT CHANGE UP
GLUCOSE SERPL-MCNC: 94 MG/DL — SIGNIFICANT CHANGE UP (ref 70–99)
HCT VFR BLD CALC: 33.6 % — LOW (ref 34.5–45)
HGB BLD-MCNC: 11.1 G/DL — LOW (ref 11.5–15.5)
IRON SATN MFR SERPL: 23 % — SIGNIFICANT CHANGE UP (ref 14–50)
IRON SATN MFR SERPL: 58 UG/DL — SIGNIFICANT CHANGE UP (ref 30–160)
MCHC RBC-ENTMCNC: 28.4 PG — SIGNIFICANT CHANGE UP (ref 27–34)
MCHC RBC-ENTMCNC: 33 GM/DL — SIGNIFICANT CHANGE UP (ref 32–36)
MCV RBC AUTO: 86.1 FL — SIGNIFICANT CHANGE UP (ref 80–100)
PLATELET # BLD AUTO: 103 K/UL — LOW (ref 150–400)
POTASSIUM SERPL-MCNC: 3.7 MMOL/L — SIGNIFICANT CHANGE UP (ref 3.5–5.3)
POTASSIUM SERPL-SCNC: 3.7 MMOL/L — SIGNIFICANT CHANGE UP (ref 3.5–5.3)
RBC # BLD: 3.89 M/UL — SIGNIFICANT CHANGE UP (ref 3.8–5.2)
RBC # BLD: 3.9 M/UL — SIGNIFICANT CHANGE UP (ref 3.8–5.2)
RBC # FLD: 12.4 % — SIGNIFICANT CHANGE UP (ref 10.3–14.5)
RETICS #: 55.2 K/UL — SIGNIFICANT CHANGE UP (ref 25–125)
RETICS/RBC NFR: 1.4 % — SIGNIFICANT CHANGE UP (ref 0.5–2.5)
SODIUM SERPL-SCNC: 138 MMOL/L — SIGNIFICANT CHANGE UP (ref 135–145)
TIBC SERPL-MCNC: 255 UG/DL — SIGNIFICANT CHANGE UP (ref 220–430)
UIBC SERPL-MCNC: 197 UG/DL — SIGNIFICANT CHANGE UP (ref 110–370)
VIT B12 SERPL-MCNC: 905 PG/ML — SIGNIFICANT CHANGE UP (ref 232–1245)
WBC # BLD: 1.3 K/UL — LOW (ref 3.8–10.5)
WBC # FLD AUTO: 1.3 K/UL — LOW (ref 3.8–10.5)

## 2018-11-08 PROCEDURE — 99232 SBSQ HOSP IP/OBS MODERATE 35: CPT

## 2018-11-08 RX ADMIN — Medication 1 PATCH: at 07:12

## 2018-11-08 RX ADMIN — ZINC SULFATE TAB 220 MG (50 MG ZINC EQUIVALENT) 220 MILLIGRAM(S): 220 (50 ZN) TAB at 11:34

## 2018-11-08 RX ADMIN — Medication 1 PATCH: at 07:10

## 2018-11-08 RX ADMIN — Medication 1 PATCH: at 22:27

## 2018-11-08 RX ADMIN — MORPHINE SULFATE 90 MILLIGRAM(S): 50 CAPSULE, EXTENDED RELEASE ORAL at 06:11

## 2018-11-08 RX ADMIN — ENOXAPARIN SODIUM 40 MILLIGRAM(S): 100 INJECTION SUBCUTANEOUS at 11:34

## 2018-11-08 RX ADMIN — Medication 150 MICROGRAM(S): at 06:10

## 2018-11-08 RX ADMIN — Medication 100 MILLIGRAM(S): at 22:29

## 2018-11-08 RX ADMIN — Medication 12.5 MILLIGRAM(S): at 06:10

## 2018-11-08 RX ADMIN — MEMANTINE HYDROCHLORIDE 10 MILLIGRAM(S): 10 TABLET ORAL at 06:11

## 2018-11-08 RX ADMIN — Medication 1 PATCH: at 07:11

## 2018-11-08 RX ADMIN — Medication 2 MILLIGRAM(S): at 06:10

## 2018-11-08 RX ADMIN — Medication 2 MILLIGRAM(S): at 17:51

## 2018-11-08 RX ADMIN — MEMANTINE HYDROCHLORIDE 10 MILLIGRAM(S): 10 TABLET ORAL at 17:51

## 2018-11-08 RX ADMIN — Medication 1 PATCH: at 11:35

## 2018-11-08 RX ADMIN — Medication 1 TABLET(S): at 11:34

## 2018-11-08 RX ADMIN — DONEPEZIL HYDROCHLORIDE 10 MILLIGRAM(S): 10 TABLET, FILM COATED ORAL at 22:29

## 2018-11-08 RX ADMIN — MORPHINE SULFATE 90 MILLIGRAM(S): 50 CAPSULE, EXTENDED RELEASE ORAL at 08:56

## 2018-11-08 RX ADMIN — SODIUM CHLORIDE 125 MILLILITER(S): 9 INJECTION INTRAMUSCULAR; INTRAVENOUS; SUBCUTANEOUS at 17:56

## 2018-11-08 RX ADMIN — MORPHINE SULFATE 90 MILLIGRAM(S): 50 CAPSULE, EXTENDED RELEASE ORAL at 17:51

## 2018-11-08 RX ADMIN — Medication 500 MILLIGRAM(S): at 11:34

## 2018-11-08 RX ADMIN — Medication 1 PATCH: at 11:34

## 2018-11-08 RX ADMIN — Medication 100 MILLIGRAM(S): at 06:13

## 2018-11-08 RX ADMIN — Medication 100 MILLIGRAM(S): at 13:43

## 2018-11-08 RX ADMIN — SODIUM CHLORIDE 125 MILLILITER(S): 9 INJECTION INTRAMUSCULAR; INTRAVENOUS; SUBCUTANEOUS at 08:57

## 2018-11-08 NOTE — PROGRESS NOTE ADULT - PROBLEM SELECTOR PLAN 1
WBC unchanged, with low ANC. Continue monitoring CBC while on antibiotic. Decision to start growth factor supplementation postponed, as unclear type of underlying marrow process.

## 2018-11-08 NOTE — PROGRESS NOTE ADULT - PROBLEM SELECTOR PLAN 6
- WBC 1.3K trending down from yesterday 1.6K  - Heme Onc consult appreciated: Pt refused bone marrow aspiration  - F/U CBC in AM  - Outpatient Heme/Onc (Dr. Gloria)

## 2018-11-08 NOTE — PROGRESS NOTE ADULT - PROBLEM SELECTOR PLAN 1
- IV Clindamycin TID (Day #3)  - s/p IV Vanco/Zosyn in ED  - Elevate right leg  - Warm compresses  - Ambulate as tolerated  - Surgery consult appreciated: No indication for debridement or surgical intervention at this time.  - F/U Blood Cultures

## 2018-11-08 NOTE — PROGRESS NOTE ADULT - PROBLEM SELECTOR PLAN 5
Hemaglobin: 11.1/ (11/8/18) trended up from 10.2 yesterday  HCT: 33.6 11/8/18 trending up from 19.2 yesterday   - MCV 86  - Normocytic anemia   - Heme/Onc Consult appreciated: Known history of pancytopenia, bone marrow aspiration refused by pt, serum B12/iron studies recommended  - F/U AM CBC

## 2018-11-08 NOTE — PROGRESS NOTE ADULT - SUBJECTIVE AND OBJECTIVE BOX
CHIEF COMPLAINT: Right Lower leg Injury/Pain    SUBJECTIVE:   Hospital Day #3 for this 69 yo F patient of Dr. Adame admitted on 11/6/18 for right cellulitic wound. Pt hit her right shin against an "old metal arnold piece" on an antique piece of furniture in her home 2 weeks ago. Pt describes that wound initially started to heal but she re-traumatized region by hitting the exact same region against metal piece approx 5 days following initial incident. She became concerned as the area around  wound became very red, painful, tender to touch with black scab formation over wound. She denies any fevers, chills, or loss of circulation/sensation in her extremities. Pt describes depressed mood, decreased appetite with associated unintentional weight loss over the past year (estimates 10-15 pound loss in the last 6 months). She  feels lonely living at home with her dog after her  of 35 years passed away about 5 years ago. Pt is a current 1ppd smoker and has smoked for the last 50 years. She has a history of chronic back pain for the last 18 years for which she is prescribed morphine and Xanax from pain management physician.  Pt also has history of neutropenia/leukopenia that is being addressed by Dr. Gloria (Heme/Onc). Pt has dementia (diagnosed in 2002), which she takes Aricept and Namenda for.     In the ED, she was given IV Vancomycin, IV Zosyn, IV Clindamycin and started on IV NaCL @125mL/hr. She was afebrile on triage and has remained afebrile to present. All other vitals are within normal limits.     11/8: Pt was seen and examined at bedside. She still reports she is unable to sleep well at  night because she woke up multiple times due to pain and tenderness of right leg. She denies any fevers, chills, nausea, diarrhea or constipation. Pt reports the pain is worse at night, but better during the day. Pt has no further complaints.     REVIEW OF SYSTEMS:  CONSTITUTIONAL: +Weight loss  No fevers or chills  RESPIRATORY: No cough, wheezing, hemoptysis; No shortness of breath  CARDIOVASCULAR: No chest pain or palpitations  GASTROINTESTINAL: No abdominal or epigastric pain. No nausea, vomiting, or hematemesis; No diarrhea or constipation. GENITOURINARY: No dysuria, frequency or hematuria  NEUROLOGICAL: +Chronic Back Pain; +Forgetful (diagnosed with dementia) No numbness or weakness  SKIN: + Right leg wound with pain   PSYCH: +Depressed Mood; +Anorexia; +Anhedonia  All other review of systems is negative unless indicated above    ICU Vital Signs Last 24 Hrs  T(C): 36.4 (08 Nov 2018 05:19), Max: 36.5 (07 Nov 2018 16:18)  T(F): 97.6 (08 Nov 2018 05:19), Max: 97.7 (07 Nov 2018 16:18)  HR: 68 (08 Nov 2018 05:19) (58 - 68)  BP: 156/88 (08 Nov 2018 05:19) (131/77 - 156/88)  RR: 15 (08 Nov 2018 05:19) (15 - 16)  SpO2: 99% (08 Nov 2018 05:19) (96% - 99%)    PHYSICAL EXAM:  Constitutional: frail cachectic woman in mild distress due to pain, awake and alert  HEENT: PERR, EOMI, Normal Hearing  Neck: Soft and supple, No LAD, No JVD  Respiratory: Breath sounds are clear bilaterally, No wheezing, rales or rhonchi  Cardiovascular: S1 and S2, Bradycardic, no Murmurs, gallops or rubs  Gastrointestinal: Bowel Sounds present, soft, nontender, nondistended, no guarding, no rebound  Extremities: 1+ pedal edema in right extremity; approx 5 cm black eschar formation on right anterior leg with mild erythema surrounding, tenderness to light palpation improving, erythema reduced from yesterday dorsalis pedis pulses 2+ intact  Vascular: 2+ peripheral pulses  Neurological: A/O x 3, no focal deficits, sensation intact in all extremities  Musculoskeletal: 5/5 strength b/l upper and lower extremities  Skin: pprox 5 cm black eschar formation on right anterior leg with mild erythema surrounding, tender to light palpation, erythema reduced from yesterday    MEDICATIONS  (STANDING):  ALPRAZolam 2 milliGRAM(s) Oral two times a day  ascorbic acid 500 milliGRAM(s) Oral daily  clindamycin IVPB 600 milliGRAM(s) IV Intermittent every 8 hours  clindamycin IVPB      diphtheria/tetanus/pertussis (acellular) Vaccine (ADAcel) 0.5 milliLiter(s) IntraMuscular once  donepezil 10 milliGRAM(s) Oral at bedtime  enoxaparin Injectable 40 milliGRAM(s) SubCutaneous daily  hydrochlorothiazide 12.5 milliGRAM(s) Oral daily  levothyroxine 150 MICROGram(s) Oral daily  memantine 10 milliGRAM(s) Oral two times a day  morphine ER Tablet 90 milliGRAM(s) Oral two times a day  multivitamin 1 Tablet(s) Oral daily  nicotine -  14 mG/24Hr(s) Patch 1 patch Transdermal daily  sodium chloride 0.9%. 1000 milliLiter(s) (125 mL/Hr) IV Continuous <Continuous>  zinc sulfate 220 milliGRAM(s) Oral daily    MEDICATIONS  (PRN):  senna 2 Tablet(s) Oral at bedtime PRN Constipation        LABS: All Labs Reviewed:             Labs                        11.1   1.3   )-----------( 103      ( 08 Nov 2018 06:29 )             33.6       138    |  104    |  10     ----------------------------<  94   3.7   |  26    |  0.78    Ca    8.3        07 Nov 2018 06:19    CAPILLARY BLOOD GLUCOSE      TPro  7.6    /  Alb  3.4    /  TBili  0.7    /  DBili  x      /  AST  38     /  ALT  30     /  AlkPhos  82     06 Nov 2018 07:15    PT/INR - ( 06 Nov 2018 07:15 )   PT: 13.6 sec;   INR: 1.21 ratio    PTT - ( 06 Nov 2018 07:15 )  PTT:41.6 sec      LIVER FUNCTIONS - ( 06 Nov 2018 07:15 )  Alb: 3.4 g/dL / Pro: 7.6 g/dL / ALK PHOS: 82 U/L / ALT: 30 U/L DA / AST: 38 U/L / GGT: x           Blood Culture: Pending    RADIOLOGY/EKG: < from: Xray Tibia + Fibula 2 Views, Right (11.06.18 @ 07:37) >  INTERPRETATION:  Left tib-fib. Patient had local injury.    There is no sense of knee effusion.    The knee and ankle are relatively free of degeneration.    No bone destruction or fracture is evident.    IMPRESSION: No acute finding.      DVT PPX: ON Lovenox    DISPOSITION: DEON Unit

## 2018-11-08 NOTE — PROGRESS NOTE ADULT - PROBLEM SELECTOR PLAN 7
- Ensure Enlive TID with meals  - Vitamin C and zinc supplementation for wound healing  - F/U Nutrition consult

## 2018-11-08 NOTE — PROGRESS NOTE ADULT - SUBJECTIVE AND OBJECTIVE BOX
ORLANDO BRUNSON, 68y Female  MRN: [id number]  ATTENDING: Parish Zaragoza    HPI:  68 female with known depression and anxiety, patient of [facility], under my care since July 2018, with persistent macrocytosis and pancytopenia. Patient admitted with cellulitic rash on right LE, in the setting of absolute neutropenia (ANC 98). Patient started on double antibiotic coverage in ED. Patient declined hematologic workup with bone marrow biopsy/ aspirate; her cytopenias are chronic, but she has been observed off growth factor support. Evaluated by surgery; no debridement necessary.    MEDICATIONS:  ALPRAZolam 2 milliGRAM(s) Oral two times a day  ascorbic acid 500 milliGRAM(s) Oral daily  clindamycin IVPB 600 milliGRAM(s) IV Intermittent every 8 hours  clindamycin IVPB      diphtheria/tetanus/pertussis (acellular) Vaccine (ADAcel) 0.5 milliLiter(s) IntraMuscular once  donepezil 10 milliGRAM(s) Oral at bedtime  enoxaparin Injectable 40 milliGRAM(s) SubCutaneous daily  hydrochlorothiazide 12.5 milliGRAM(s) Oral daily  levothyroxine 150 MICROGram(s) Oral daily  memantine 10 milliGRAM(s) Oral two times a day  morphine ER Tablet 90 milliGRAM(s) Oral two times a day  multivitamin 1 Tablet(s) Oral daily  nicotine -  14 mG/24Hr(s) Patch 1 patch Transdermal daily  senna 2 Tablet(s) Oral at bedtime PRN  sodium chloride 0.9%. 1000 milliLiter(s) IV Continuous <Continuous>  zinc sulfate 220 milliGRAM(s) Oral daily    All other medications reviewed.    SUBJECTIVE:  Lying in bed; offers no new complaints, but remains anxious.    VITALS:  T(C): 36.4 (11-08-18 @ 05:19), Max: 36.5 (11-07-18 @ 16:18)  T(F): 97.6 (11-08-18 @ 05:19), Max: 97.7 (11-07-18 @ 16:18)  HR: 68 (11-08-18 @ 05:19) (58 - 68)  BP: 156/88 (11-08-18 @ 05:19) (131/77 - 156/88)    PHYSICAL EXAM:  Constitutional: Alert and oriented x 3, anxious  Eyes/ ENMT: PERRLA,   Respiratory: clear to auscultation bilaterally  Cardiovascular: S1,S2 rhythmic  Gastrointestinal: no guarding/ rebound  Extremities: no calf tenderness;  peripheral edema  Skin: +cellulitic rash right lower extremity; skin crust measuring 2 inches in greatest diameter,   on anterior right shin    LABS:  WBC Count: 1.3 K/uL (11-08-18 @ 06:29)  WBC Count: 1.6 K/uL (11-07-18 @ 06:19)  WBC Count: 1.4 K/uL (11-06-18 @ 07:15)    Platelet Count - Automated: 103 K/uL (11-08-18 @ 06:29)  Platelet Count - Automated: 86 K/uL (11-07-18 @ 06:19)  Platelet Count - Automated: 94 K/uL (11-06-18 @ 07:15)      (11-08) WBC: 1.3 K/uL,Hemoglobin: 11.1 g/dL, Hematocrit: 33.6 %,  Platelet: 103 K/uL  (11-08) Na: 138 mmol/L ; K: 3.7 mmol/L ; BUN: 10 mg/dL ; Cr: 0.78 mg/dL.    RADIOLOGY:  Xray Tibia + Fibula 2 Views, Right (11.06.18 @ 07:37) >  EXAM:  LEG AP&LAT RIGHT    PROCEDURE DATE:  11/06/2018    INTERPRETATION:  Left tib-fib. Patient had local injury.  There is no sense of knee effusion.The knee and ankle are relatively free of degeneration.  No bone destruction or fracture is evident.  IMPRESSION: No acute finding.

## 2018-11-09 VITALS
OXYGEN SATURATION: 99 % | SYSTOLIC BLOOD PRESSURE: 186 MMHG | DIASTOLIC BLOOD PRESSURE: 101 MMHG | RESPIRATION RATE: 15 BRPM | TEMPERATURE: 98 F | HEART RATE: 87 BPM

## 2018-11-09 LAB
ANION GAP SERPL CALC-SCNC: 6 MMOL/L — SIGNIFICANT CHANGE UP (ref 5–17)
BUN SERPL-MCNC: 12 MG/DL — SIGNIFICANT CHANGE UP (ref 7–23)
CALCIUM SERPL-MCNC: 8.2 MG/DL — LOW (ref 8.4–10.5)
CHLORIDE SERPL-SCNC: 105 MMOL/L — SIGNIFICANT CHANGE UP (ref 96–108)
CO2 SERPL-SCNC: 28 MMOL/L — SIGNIFICANT CHANGE UP (ref 22–31)
CREAT SERPL-MCNC: 0.7 MG/DL — SIGNIFICANT CHANGE UP (ref 0.5–1.3)
GLUCOSE SERPL-MCNC: 91 MG/DL — SIGNIFICANT CHANGE UP (ref 70–99)
HCT VFR BLD CALC: 34 % — LOW (ref 34.5–45)
HGB BLD-MCNC: 11.4 G/DL — LOW (ref 11.5–15.5)
MCHC RBC-ENTMCNC: 28.8 PG — SIGNIFICANT CHANGE UP (ref 27–34)
MCHC RBC-ENTMCNC: 33.6 GM/DL — SIGNIFICANT CHANGE UP (ref 32–36)
MCV RBC AUTO: 85.6 FL — SIGNIFICANT CHANGE UP (ref 80–100)
PLATELET # BLD AUTO: 106 K/UL — LOW (ref 150–400)
POTASSIUM SERPL-MCNC: 3.7 MMOL/L — SIGNIFICANT CHANGE UP (ref 3.5–5.3)
POTASSIUM SERPL-SCNC: 3.7 MMOL/L — SIGNIFICANT CHANGE UP (ref 3.5–5.3)
RBC # BLD: 3.97 M/UL — SIGNIFICANT CHANGE UP (ref 3.8–5.2)
RBC # FLD: 12.2 % — SIGNIFICANT CHANGE UP (ref 10.3–14.5)
SODIUM SERPL-SCNC: 139 MMOL/L — SIGNIFICANT CHANGE UP (ref 135–145)
WBC # BLD: 1.3 K/UL — LOW (ref 3.8–10.5)
WBC # FLD AUTO: 1.3 K/UL — LOW (ref 3.8–10.5)

## 2018-11-09 PROCEDURE — 99232 SBSQ HOSP IP/OBS MODERATE 35: CPT

## 2018-11-09 RX ORDER — ASCORBIC ACID 60 MG
1 TABLET,CHEWABLE ORAL
Qty: 30 | Refills: 0
Start: 2018-11-09 | End: 2018-12-08

## 2018-11-09 RX ORDER — NICOTINE POLACRILEX 2 MG
1 GUM BUCCAL
Qty: 1 | Refills: 0 | OUTPATIENT
Start: 2018-11-09 | End: 2018-12-08

## 2018-11-09 RX ORDER — ALPRAZOLAM 0.25 MG
1 TABLET ORAL
Qty: 0 | Refills: 0 | COMMUNITY
Start: 2018-11-09

## 2018-11-09 RX ORDER — SENNA PLUS 8.6 MG/1
2 TABLET ORAL
Qty: 60 | Refills: 0 | OUTPATIENT
Start: 2018-11-09 | End: 2018-12-08

## 2018-11-09 RX ORDER — CEFUROXIME AXETIL 250 MG
1 TABLET ORAL
Qty: 12 | Refills: 0 | OUTPATIENT
Start: 2018-11-09 | End: 2018-11-14

## 2018-11-09 RX ORDER — ZINC SULFATE TAB 220 MG (50 MG ZINC EQUIVALENT) 220 (50 ZN) MG
1 TAB ORAL
Qty: 30 | Refills: 0 | OUTPATIENT
Start: 2018-11-09 | End: 2018-12-08

## 2018-11-09 RX ADMIN — Medication 150 MICROGRAM(S): at 06:37

## 2018-11-09 RX ADMIN — MEMANTINE HYDROCHLORIDE 10 MILLIGRAM(S): 10 TABLET ORAL at 17:11

## 2018-11-09 RX ADMIN — MORPHINE SULFATE 90 MILLIGRAM(S): 50 CAPSULE, EXTENDED RELEASE ORAL at 18:11

## 2018-11-09 RX ADMIN — MEMANTINE HYDROCHLORIDE 10 MILLIGRAM(S): 10 TABLET ORAL at 06:36

## 2018-11-09 RX ADMIN — ZINC SULFATE TAB 220 MG (50 MG ZINC EQUIVALENT) 220 MILLIGRAM(S): 220 (50 ZN) TAB at 12:20

## 2018-11-09 RX ADMIN — Medication 12.5 MILLIGRAM(S): at 06:37

## 2018-11-09 RX ADMIN — Medication 1 PATCH: at 12:19

## 2018-11-09 RX ADMIN — MORPHINE SULFATE 90 MILLIGRAM(S): 50 CAPSULE, EXTENDED RELEASE ORAL at 06:36

## 2018-11-09 RX ADMIN — Medication 1 PATCH: at 06:37

## 2018-11-09 RX ADMIN — ENOXAPARIN SODIUM 40 MILLIGRAM(S): 100 INJECTION SUBCUTANEOUS at 12:20

## 2018-11-09 RX ADMIN — Medication 2 MILLIGRAM(S): at 06:36

## 2018-11-09 RX ADMIN — Medication 1 PATCH: at 12:20

## 2018-11-09 RX ADMIN — Medication 100 MILLIGRAM(S): at 06:38

## 2018-11-09 RX ADMIN — Medication 1 PATCH: at 18:20

## 2018-11-09 RX ADMIN — MORPHINE SULFATE 90 MILLIGRAM(S): 50 CAPSULE, EXTENDED RELEASE ORAL at 07:30

## 2018-11-09 RX ADMIN — Medication 1 TABLET(S): at 12:20

## 2018-11-09 RX ADMIN — Medication 100 MILLIGRAM(S): at 13:24

## 2018-11-09 RX ADMIN — Medication 2 MILLIGRAM(S): at 17:11

## 2018-11-09 RX ADMIN — SODIUM CHLORIDE 125 MILLILITER(S): 9 INJECTION INTRAMUSCULAR; INTRAVENOUS; SUBCUTANEOUS at 10:01

## 2018-11-09 RX ADMIN — MORPHINE SULFATE 90 MILLIGRAM(S): 50 CAPSULE, EXTENDED RELEASE ORAL at 17:11

## 2018-11-09 RX ADMIN — SODIUM CHLORIDE 125 MILLILITER(S): 9 INJECTION INTRAMUSCULAR; INTRAVENOUS; SUBCUTANEOUS at 12:25

## 2018-11-09 RX ADMIN — SODIUM CHLORIDE 125 MILLILITER(S): 9 INJECTION INTRAMUSCULAR; INTRAVENOUS; SUBCUTANEOUS at 03:26

## 2018-11-09 RX ADMIN — MORPHINE SULFATE 90 MILLIGRAM(S): 50 CAPSULE, EXTENDED RELEASE ORAL at 03:20

## 2018-11-09 RX ADMIN — Medication 500 MILLIGRAM(S): at 12:20

## 2018-11-09 NOTE — PROGRESS NOTE ADULT - ASSESSMENT
68 year old as described above admitted for right lower extremity cellulitic wound.
68 female with known depression and anxiety, patient of [facility], under my care since July 2018, with persistent macrocytosis and pancytopenia. Patient admitted with cellulitic rash on right LE, in the setting of absolute neutropenia (ANC 98). Patient started on double antibiotic coverage in ED. Patient declined hematologic workup with bone marrow biopsy/ aspirate; her cytopenias are chronic, but she has been observed off growth factor support. Evaluated by surgery; no debridement necessary.
68 female with known depression and anxiety, patient of [facility], under my care since July 2018, with persistent macrocytosis and pancytopenia. Patient admitted with cellulitic rash on right LE, in the setting of absolute neutropenia (ANC 98). Patient started on double antibiotic coverage in ED. Patient declined hematologic workup with bone marrow biopsy/ aspirate; her cytopenias are chronic, but she has been observed off growth factor support. Evaluated by surgery; no debridement necessary.
68 year old as described above admitted for right lower extremity cellulitic wound.
68 year old as described above admitted for right lower extremity cellulitic wound.
68-year-old woman with history of depression/anxiety and pancytopenia, who was admitted with a cellulitic rash/wound on her right lower extremity, following recurrent trauma to the area with first trauma approximately 2 weeks PTA. Patient begun on IV antibiotics. Patient has declined hematologic workup, including bone marrow aspirate/biopsy (hematologist-Dr. Gloria).
68 year old as described above admitted for right lower extremity cellulitic wound.

## 2018-11-09 NOTE — PROGRESS NOTE ADULT - PROBLEM SELECTOR PLAN 1
- IV Clindamycin TID (Day #4)  - s/p IV Vanco/Zosyn in ED  - Elevate right leg  - Warm compresses  - Ambulate as tolerated  - Surgery consult appreciated: No indication for debridement or surgical intervention at this time.  - Blood Cultures NGTD - IV Clindamycin TID (Day #4)  - s/p IV Vanco/Zosyn in ED  - Elevate right leg  - Warm compresses  - Ambulate as tolerated  - Surgery consult appreciated: No indication for debridement or surgical intervention at this time.  - Blood Cultures NGTD  - Discharge planning to home on PO Abx

## 2018-11-09 NOTE — PROGRESS NOTE ADULT - PROBLEM SELECTOR PLAN 7
- Ensure Enlive TID with meals  - Vitamin C and zinc supplementation for wound healing  - Nutrition consult appreciated

## 2018-11-09 NOTE — PROGRESS NOTE ADULT - PROBLEM SELECTOR PLAN 2
-Home dose of Morphine ER 90mg BID  -Xanax 2 mg BID
Platelet count above 100K, and no evidence of spontaneous bleeding.  OK from heme perspective for discharge.
Platelet count steadily improving. Continue present care.
-Home dose of Morphine ER 90mg BID  -Xanax 2 mg BID

## 2018-11-09 NOTE — PROGRESS NOTE ADULT - SUBJECTIVE AND OBJECTIVE BOX
ORLANDO BRUNSON, 68y Female  MRN: [id number]  ATTENDING: Parish Zaragoza    HPI:  68 female with known depression and anxiety, patient of [facility], under my care since July 2018, with persistent macrocytosis and pancytopenia. Patient admitted with cellulitic rash on right LE, in the setting of absolute neutropenia (ANC 98). Patient started on double antibiotic coverage in ED. Patient declined hematologic workup with bone marrow biopsy/ aspirate; her cytopenias are chronic, but she has been observed off growth factor support. Evaluated by surgery; no debridement necessary.    MEDICATIONS:  ALPRAZolam 2 milliGRAM(s) Oral two times a day  ascorbic acid 500 milliGRAM(s) Oral daily  clindamycin IVPB 600 milliGRAM(s) IV Intermittent every 8 hours  clindamycin IVPB      diphtheria/tetanus/pertussis (acellular) Vaccine (ADAcel) 0.5 milliLiter(s) IntraMuscular once  donepezil 10 milliGRAM(s) Oral at bedtime  enoxaparin Injectable 40 milliGRAM(s) SubCutaneous daily  hydrochlorothiazide 12.5 milliGRAM(s) Oral daily  levothyroxine 150 MICROGram(s) Oral daily  memantine 10 milliGRAM(s) Oral two times a day  morphine ER Tablet 90 milliGRAM(s) Oral two times a day  multivitamin 1 Tablet(s) Oral daily  nicotine -  14 mG/24Hr(s) Patch 1 patch Transdermal daily  senna 2 Tablet(s) Oral at bedtime PRN  sodium chloride 0.9%. 1000 milliLiter(s) IV Continuous <Continuous>  zinc sulfate 220 milliGRAM(s) Oral daily    All other medications reviewed.    SUBJECTIVE:  Stable, continues on antibiotics. Cellulitic rash much improved.    VITALS:  T(C): 36.6, Max: 36.8 (11-08-18 @ 16:57)  T(F): 97.8, Max: 98.3 (11-08-18 @ 16:57)  HR: 56 (56 - 67)  BP: 152/83 (142/80 - 152/83)  SpO2: 95% (95% - 98%)    PHYSICAL EXAM:  Constitutional: Alert and oriented x 3, anxious  Eyes/ ENMT: PERRLA,   Respiratory: clear to auscultation bilaterally  Cardiovascular: S1,S2 rhythmic  Gastrointestinal: no guarding/ rebound  Extremities: no calf tenderness;  peripheral edema  Skin: +cellulitic rash right lower extremity much improved;   skin crust measuring 2 inches in greatest diameter,   on anterior right shin    LABS:  (11-09) WBC: 1.3 K/uL,Hemoglobin: 11.4 g/dL, Hematocrit: 34.0 %,  Platelet: 106 K/uL    WBC Count: 1.3 K/uL (11-08-18 @ 06:29)  WBC Count: 1.6 K/uL (11-07-18 @ 06:19)  WBC Count: 1.4 K/uL (11-06-18 @ 07:15)    Platelet Count - Automated: 103 K/uL (11-08-18 @ 06:29)  Platelet Count - Automated: 86 K/uL (11-07-18 @ 06:19)  Platelet Count - Automated: 94 K/uL (11-06-18 @ 07:15)      (11-08) WBC: 1.3 K/uL,Hemoglobin: 11.1 g/dL, Hematocrit: 33.6 %,  Platelet: 103 K/uL  (11-08) Na: 138 mmol/L ; K: 3.7 mmol/L ; BUN: 10 mg/dL ; Cr: 0.78 mg/dL.    RADIOLOGY:  Xray Tibia + Fibula 2 Views, Right (11.06.18 @ 07:37) >  EXAM:  LEG AP&LAT RIGHT    PROCEDURE DATE:  11/06/2018    INTERPRETATION:  Left tib-fib. Patient had local injury.  There is no sense of knee effusion.The knee and ankle are relatively free of degeneration.  No bone destruction or fracture is evident.  IMPRESSION: No acute finding.

## 2018-11-09 NOTE — PROGRESS NOTE ADULT - PROBLEM SELECTOR PROBLEM 4
Early onset Alzheimer's dementia without behavioral disturbance

## 2018-11-09 NOTE — PROGRESS NOTE ADULT - ATTENDING COMMENTS
Consultant notes reviewed : YES [x ] ; NO [ ]
Family Medicine Attending Addendum  Patient was seen on rounds, interviewed and examined with Dr. Martin. Medical Record reviewed. History, review of systems, physical findings and lab results as documented confirmed, except as modified by me. Agree with management plan as described except as modified below.
Family Medicine Attending Addendum  Patient was seen on rounds, interviewed and examined with Dr. Mayorga. Medical Record reviewed. History, review of systems, physical findings and lab results as documented confirmed, except as modified by me. Agree with management plan as described except as modified below.
Family Medicine Attending Addendum  Patient was seen on rounds, interviewed and examined with Dr. Martin. Medical Record reviewed. History, review of systems, physical findings and lab results as documented confirmed, except as modified by me. Agree with management plan as described except as modified below.
Family Medicine Attending Addendum  Patient was seen on rounds, interviewed and examined with Dr. Martin. Medical Record reviewed. History, review of systems, physical findings and lab results as documented confirmed, except as modified by me. Agree with management plan as described except as modified below.

## 2018-11-09 NOTE — PROGRESS NOTE ADULT - PROBLEM SELECTOR PROBLEM 2
Chronic bilateral low back pain, with sciatica presence unspecified
Thrombocytopenia
Thrombocytopenia
Chronic bilateral low back pain, with sciatica presence unspecified

## 2018-11-09 NOTE — PROGRESS NOTE ADULT - PROBLEM SELECTOR PLAN 3
Synthroid home dose 150mcg  - TSH 8.16 Synthroid home dose 150mcg  - TSH 8.16  - Will need outpatient TFTs and possible adjustment of Synthroid dose

## 2018-11-09 NOTE — PROGRESS NOTE ADULT - REASON FOR ADMISSION
Cellulitic wound with known neutropenia/leukopenia
Satisfactory
Cellulitic wound with known neutropenia/leukopenia

## 2018-11-09 NOTE — PROGRESS NOTE ADULT - SUBJECTIVE AND OBJECTIVE BOX
CHIEF COMPLAINT: Right Lower leg Injury/Pain    SUBJECTIVE:   Hospital Day #4 for this 67 yo F patient of Dr. Adame admitted on 11/6/18 for right cellulitic wound. Pt hit her right shin against an "old metal arnold piece" on an antique piece of furniture in her home 2 weeks ago. Pt describes that wound initially started to heal but she re-traumatized region by hitting the exact same region against metal piece approx 5 days following initial incident. She became concerned as the area around  wound became very red, painful, tender to touch with black scab formation over wound. She denies any fevers, chills, or loss of circulation/sensation in her extremities. Pt describes depressed mood, decreased appetite with associated unintentional weight loss over the past year (estimates 10-15 pound loss in the last 6 months). She  feels lonely living at home with her dog after her  of 35 years passed away about 5 years ago. Pt is a current 1ppd smoker and has smoked for the last 50 years. She has a history of chronic back pain for the last 18 years for which she is prescribed morphine and Xanax from pain management physician.  Pt also has history of neutropenia/leukopenia that is being addressed by Dr. Gloria (Heme/Onc). Pt has dementia (diagnosed in 2002), which she takes Aricept and Namenda for.     In the ED, she was given IV Vancomycin, IV Zosyn, IV Clindamycin and started on IV NaCL @125mL/hr. She was afebrile on triage and has remained afebrile to present. All other vitals are within normal limits.     11/8: Pt was seen and examined at bedside. She still reports she is unable to sleep well at  night because she woke up multiple times due to pain and tenderness of right leg. She denies any fevers, chills, nausea, diarrhea or constipation. Pt reports the pain is worse at night, but better during the day. Pt has no further complaints.     REVIEW OF SYSTEMS:  CONSTITUTIONAL: +Weight loss  No fevers or chills  RESPIRATORY: No cough, wheezing, hemoptysis; No shortness of breath  CARDIOVASCULAR: No chest pain or palpitations  GASTROINTESTINAL: No abdominal or epigastric pain. No nausea, vomiting, or hematemesis; No diarrhea or constipation. GENITOURINARY: No dysuria, frequency or hematuria  NEUROLOGICAL: +Chronic Back Pain; +Forgetful (diagnosed with dementia) No numbness or weakness  SKIN: + Right leg wound with pain   PSYCH: +Depressed Mood; +Anorexia; +Anhedonia  All other review of systems is negative unless indicated above    Vital Signs Last 24 Hrs  T(C): 36.6 (09 Nov 2018 05:28), Max: 36.8 (08 Nov 2018 16:57)  T(F): 97.8 (09 Nov 2018 05:28), Max: 98.3 (08 Nov 2018 16:57)  HR: 56 (09 Nov 2018 05:28) (56 - 67)  BP: 152/83 (09 Nov 2018 05:28) (142/80 - 152/83)  RR: 16 (09 Nov 2018 05:28) (15 - 16)  SpO2: 95% (09 Nov 2018 05:28) (95% - 98%)    PHYSICAL EXAM:  Constitutional: frail cachectic woman in mild distress due to pain, awake and alert  HEENT: PERR, EOMI, Normal Hearing  Neck: Soft and supple, No LAD, No JVD  Respiratory: Breath sounds are clear bilaterally, No wheezing, rales or rhonchi  Cardiovascular: S1 and S2, Bradycardic, no Murmurs, gallops or rubs  Gastrointestinal: Bowel Sounds present, soft, nontender, nondistended, no guarding, no rebound  Extremities: 1+ pedal edema in right extremity; approx 5 cm black eschar formation on right anterior leg with mild erythema surrounding, tenderness to light palpation improving, erythema reduced from yesterday dorsalis pedis pulses 2+ intact  Vascular: 2+ peripheral pulses  Neurological: A/O x 3, no focal deficits, sensation intact in all extremities  Musculoskeletal: 5/5 strength b/l upper and lower extremities  Skin: approx 5 cm black eschar formation on right anterior leg with mild erythema surrounding, tender to light palpation, erythema reduced from yesterday    MEDICATIONS  (STANDING):  ALPRAZolam 2 milliGRAM(s) Oral two times a day  ascorbic acid 500 milliGRAM(s) Oral daily  clindamycin IVPB 600 milliGRAM(s) IV Intermittent every 8 hours  clindamycin IVPB      diphtheria/tetanus/pertussis (acellular) Vaccine (ADAcel) 0.5 milliLiter(s) IntraMuscular once  donepezil 10 milliGRAM(s) Oral at bedtime  enoxaparin Injectable 40 milliGRAM(s) SubCutaneous daily  hydrochlorothiazide 12.5 milliGRAM(s) Oral daily  levothyroxine 150 MICROGram(s) Oral daily  memantine 10 milliGRAM(s) Oral two times a day  morphine ER Tablet 90 milliGRAM(s) Oral two times a day  multivitamin 1 Tablet(s) Oral daily  nicotine -  14 mG/24Hr(s) Patch 1 patch Transdermal daily  sodium chloride 0.9%. 1000 milliLiter(s) (125 mL/Hr) IV Continuous <Continuous>  zinc sulfate 220 milliGRAM(s) Oral daily    MEDICATIONS  (PRN):  senna 2 Tablet(s) Oral at bedtime PRN Constipation        LABS: All Labs Reviewed:                                   11.4   1.3   )-----------( 106      ( 09 Nov 2018 06:30 )             34.0     09 Nov 2018 06:30    139    |  105    |  12     ----------------------------<  91     3.7     |  28     |  0.70     Ca    8.2        09 Nov 2018 06:30        Blood Culture: Pending    RADIOLOGY/EKG: < from: Xray Tibia + Fibula 2 Views, Right (11.06.18 @ 07:37) >  INTERPRETATION:  Left tib-fib. Patient had local injury.    There is no sense of knee effusion.    The knee and ankle are relatively free of degeneration.    No bone destruction or fracture is evident.    IMPRESSION: No acute finding.      DVT PPX: ON Lovenox    DISPOSITION: [facility] Unit CHIEF COMPLAINT: Right Lower leg Injury/Pain    SUBJECTIVE:   Hospital Day #4 for this 67 yo F patient of Dr. Adame admitted on 11/6/18 for right cellulitic wound. Pt hit her right shin against an "old metal arnold piece" on an antique piece of furniture in her home 2 weeks ago. Pt describes that wound initially started to heal but she re-traumatized region by hitting the exact same region against metal piece approx 5 days following initial incident. She became concerned as the area around  wound became very red, painful, tender to touch with black scab formation over wound. She denies any fevers, chills, or loss of circulation/sensation in her extremities. Pt describes depressed mood, decreased appetite with associated unintentional weight loss over the past year (estimates 10-15 pound loss in the last 6 months). She  feels lonely living at home with her dog after her  of 35 years passed away about 5 years ago. Pt is a current 1ppd smoker and has smoked for the last 50 years. She has a history of chronic back pain for the last 18 years for which she is prescribed morphine and Xanax from pain management physician.  Pt also has history of neutropenia/leukopenia that is being addressed by Dr. Gloria (Heme/Onc). Pt has dementia (diagnosed in 2002), which she takes Aricept and Namenda for.     In the ED, she was given IV Vancomycin, IV Zosyn, IV Clindamycin and started on IV NaCL @125mL/hr. She was afebrile on triage and has remained afebrile to present. All other vitals are within normal limits.     11/9: Pt was seen and examined at bedside. She still reports disrupted sleep due to pain and tenderness of right leg. She denies any fevers, chills, nausea, diarrhea or constipation. Pt reports the pain is worse at night, but better during the day. She has no further complaints. Discharge planning to home on PO Antibiotics discussed with patient at bedside.    REVIEW OF SYSTEMS:  CONSTITUTIONAL: +Weight loss  No fevers or chills  RESPIRATORY: No cough, wheezing, hemoptysis; No shortness of breath  CARDIOVASCULAR: No chest pain or palpitations  GASTROINTESTINAL: No abdominal or epigastric pain. No nausea, vomiting, or hematemesis; No diarrhea or constipation. GENITOURINARY: No dysuria, frequency or hematuria  NEUROLOGICAL: +Chronic Back Pain; +Forgetful (diagnosed with dementia) No numbness or weakness  SKIN: + Right leg wound with pain   PSYCH: +Depressed Mood; +Anorexia; +Anhedonia  All other review of systems is negative unless indicated above    Vital Signs Last 24 Hrs  T(C): 36.6 (09 Nov 2018 05:28), Max: 36.8 (08 Nov 2018 16:57)  T(F): 97.8 (09 Nov 2018 05:28), Max: 98.3 (08 Nov 2018 16:57)  HR: 56 (09 Nov 2018 05:28) (56 - 67)  BP: 152/83 (09 Nov 2018 05:28) (142/80 - 152/83)  RR: 16 (09 Nov 2018 05:28) (15 - 16)  SpO2: 95% (09 Nov 2018 05:28) (95% - 98%)    PHYSICAL EXAM:  Constitutional: frail cachectic woman in mild distress due to pain, awake and alert  HEENT: PERR, EOMI, Normal Hearing  Neck: Soft and supple, No LAD, No JVD  Respiratory: Breath sounds are clear bilaterally, No wheezing, rales or rhonchi  Cardiovascular: S1 and S2, Bradycardic, no Murmurs, gallops or rubs  Gastrointestinal: Bowel Sounds present, soft, nontender, nondistended, no guarding, no rebound  Extremities: 1+ pedal edema in right extremity; approx 5 cm black eschar formation on right anterior leg with mild erythema surrounding, tenderness to light palpation improving, erythema reduced from yesterday dorsalis pedis pulses 2+ intact  Vascular: 2+ peripheral pulses  Neurological: A/O x 3, no focal deficits, sensation intact in all extremities  Musculoskeletal: 5/5 strength b/l upper and lower extremities  Skin: approx 5 cm black eschar formation on right anterior leg with mild erythema surrounding, tender to light palpation, erythema reduced from yesterday, scaling and skin peeling noted around eschar.    MEDICATIONS  (STANDING):  ALPRAZolam 2 milliGRAM(s) Oral two times a day  ascorbic acid 500 milliGRAM(s) Oral daily  clindamycin IVPB 600 milliGRAM(s) IV Intermittent every 8 hours  clindamycin IVPB      diphtheria/tetanus/pertussis (acellular) Vaccine (ADAcel) 0.5 milliLiter(s) IntraMuscular once  donepezil 10 milliGRAM(s) Oral at bedtime  enoxaparin Injectable 40 milliGRAM(s) SubCutaneous daily  hydrochlorothiazide 12.5 milliGRAM(s) Oral daily  levothyroxine 150 MICROGram(s) Oral daily  memantine 10 milliGRAM(s) Oral two times a day  morphine ER Tablet 90 milliGRAM(s) Oral two times a day  multivitamin 1 Tablet(s) Oral daily  nicotine -  14 mG/24Hr(s) Patch 1 patch Transdermal daily  sodium chloride 0.9%. 1000 milliLiter(s) (125 mL/Hr) IV Continuous <Continuous>  zinc sulfate 220 milliGRAM(s) Oral daily    MEDICATIONS  (PRN):  senna 2 Tablet(s) Oral at bedtime PRN Constipation        LABS: All Labs Reviewed:                                   11.4   1.3   )-----------( 106      ( 09 Nov 2018 06:30 )             34.0     09 Nov 2018 06:30    139    |  105    |  12     ----------------------------<  91     3.7     |  28     |  0.70     Ca    8.2        09 Nov 2018 06:30      Blood Culture: Pending    RADIOLOGY/EKG: < from: Xray Tibia + Fibula 2 Views, Right (11.06.18 @ 07:37) >  INTERPRETATION:  Left tib-fib. Patient had local injury.    There is no sense of knee effusion.    The knee and ankle are relatively free of degeneration.    No bone destruction or fracture is evident.    IMPRESSION: No acute finding.      DVT PPX: ON Lovenox    DISPOSITION: [facility] Unit

## 2018-11-09 NOTE — PROGRESS NOTE ADULT - PROBLEM SELECTOR PROBLEM 1
Cellulitis of leg without foot, right
Neutropenia, unspecified type
Neutropenia, unspecified type
Pancytopenia
Cellulitis of leg without foot, right

## 2018-11-09 NOTE — PROGRESS NOTE ADULT - PROBLEM SELECTOR PLAN 6
- WBC 1.3K   - Heme Onc consult appreciated: Pt refused bone marrow aspiration  - F/U CBC in AM  - Outpatient Heme/Onc (Dr. Gloria) - WBC 1.3K , chronic based on outpatient labs  - Heme Onc consult appreciated: Pt refused bone marrow aspiration  - Outpatient Heme/Onc (Dr. Gloria)

## 2018-11-09 NOTE — PROGRESS NOTE ADULT - PROBLEM SELECTOR PLAN 4
Home dose of Aricept and Namenda

## 2018-11-09 NOTE — PROGRESS NOTE ADULT - PROBLEM SELECTOR PLAN 5
- H/H  - MCV 86  - Normocytic anemia   - Heme/Onc Consult appreciated: Known history of pancytopenia, bone marrow aspiration refused by pt, serum B12/iron studies recommended  - F/U AM CBC - H/H 11.4 today  - MCV 86  - Normocytic anemia   - Heme/Onc Consult appreciated: Known history of pancytopenia, bone marrow aspiration refused by pt, serum B12/iron studies recommended  - Outpatient follow up - H/H 11.4 today  - MCV 86  - Normocytic anemia   - Heme/Onc Consult appreciated: Known history of pancytopenia, bone marrow aspiration refused by pt, serum B12/iron studies recommended  - B12/Folate/Iron studies all within normal limits  - Outpatient follow up

## 2018-11-10 PROBLEM — E03.9 HYPOTHYROIDISM, UNSPECIFIED: Chronic | Status: ACTIVE | Noted: 2018-11-06

## 2018-11-10 PROBLEM — G30.9 ALZHEIMER'S DISEASE, UNSPECIFIED: Chronic | Status: ACTIVE | Noted: 2018-11-06

## 2018-11-10 PROBLEM — M54.9 DORSALGIA, UNSPECIFIED: Chronic | Status: ACTIVE | Noted: 2018-11-06

## 2018-11-11 LAB
CULTURE RESULTS: SIGNIFICANT CHANGE UP
CULTURE RESULTS: SIGNIFICANT CHANGE UP
SPECIMEN SOURCE: SIGNIFICANT CHANGE UP
SPECIMEN SOURCE: SIGNIFICANT CHANGE UP

## 2018-11-12 NOTE — ED ADULT TRIAGE NOTE - NS ED NURSE DIRECT TO ROOM YN
Outcome: Left Message    Please transfer to Patient Outreach Team at [phone] when patient returns call.      Attempt # 4         No

## 2018-11-16 ENCOUNTER — APPOINTMENT (OUTPATIENT)
Dept: FAMILY MEDICINE | Facility: HOSPITAL | Age: 68
End: 2018-11-16

## 2018-11-27 PROCEDURE — 85730 THROMBOPLASTIN TIME PARTIAL: CPT

## 2018-11-27 PROCEDURE — 85610 PROTHROMBIN TIME: CPT

## 2018-11-27 PROCEDURE — 73590 X-RAY EXAM OF LOWER LEG: CPT

## 2018-11-27 PROCEDURE — 80048 BASIC METABOLIC PNL TOTAL CA: CPT

## 2018-11-27 PROCEDURE — 93005 ELECTROCARDIOGRAM TRACING: CPT

## 2018-11-27 PROCEDURE — 80053 COMPREHEN METABOLIC PANEL: CPT

## 2018-11-27 PROCEDURE — 87040 BLOOD CULTURE FOR BACTERIA: CPT

## 2018-11-27 PROCEDURE — 99285 EMERGENCY DEPT VISIT HI MDM: CPT | Mod: 25

## 2018-11-27 PROCEDURE — 96365 THER/PROPH/DIAG IV INF INIT: CPT

## 2018-11-27 PROCEDURE — 84443 ASSAY THYROID STIM HORMONE: CPT

## 2018-11-27 PROCEDURE — 85027 COMPLETE CBC AUTOMATED: CPT

## 2018-11-27 PROCEDURE — 85652 RBC SED RATE AUTOMATED: CPT

## 2018-12-19 ENCOUNTER — EMERGENCY (EMERGENCY)
Facility: HOSPITAL | Age: 68
LOS: 1 days | Discharge: HOME | End: 2018-12-19
Attending: INTERNAL MEDICINE | Admitting: INTERNAL MEDICINE
Payer: MEDICARE

## 2018-12-19 VITALS
WEIGHT: 100.09 LBS | HEIGHT: 64 IN | OXYGEN SATURATION: 97 % | DIASTOLIC BLOOD PRESSURE: 56 MMHG | SYSTOLIC BLOOD PRESSURE: 101 MMHG | RESPIRATION RATE: 18 BRPM | HEART RATE: 118 BPM | TEMPERATURE: 97 F

## 2018-12-19 VITALS
SYSTOLIC BLOOD PRESSURE: 148 MMHG | OXYGEN SATURATION: 98 % | TEMPERATURE: 98 F | DIASTOLIC BLOOD PRESSURE: 78 MMHG | RESPIRATION RATE: 17 BRPM | HEART RATE: 70 BPM

## 2018-12-19 DIAGNOSIS — E86.0 DEHYDRATION: ICD-10-CM

## 2018-12-19 LAB
ALBUMIN SERPL ELPH-MCNC: 4.3 G/DL — SIGNIFICANT CHANGE UP (ref 3.3–5)
ALP SERPL-CCNC: 90 U/L — SIGNIFICANT CHANGE UP (ref 40–120)
ALT FLD-CCNC: 66 U/L DA — HIGH (ref 10–45)
ANION GAP SERPL CALC-SCNC: 15 MMOL/L — SIGNIFICANT CHANGE UP (ref 5–17)
AST SERPL-CCNC: 54 U/L — HIGH (ref 10–40)
BASOPHILS # BLD AUTO: 0.1 K/UL — SIGNIFICANT CHANGE UP (ref 0–0.2)
BASOPHILS NFR BLD AUTO: 1.3 % — SIGNIFICANT CHANGE UP (ref 0–2)
BILIRUB SERPL-MCNC: 0.8 MG/DL — SIGNIFICANT CHANGE UP (ref 0.2–1.2)
BUN SERPL-MCNC: 43 MG/DL — HIGH (ref 7–23)
CALCIUM SERPL-MCNC: 10.1 MG/DL — SIGNIFICANT CHANGE UP (ref 8.4–10.5)
CHLORIDE SERPL-SCNC: 102 MMOL/L — SIGNIFICANT CHANGE UP (ref 96–108)
CO2 SERPL-SCNC: 22 MMOL/L — SIGNIFICANT CHANGE UP (ref 22–31)
CREAT SERPL-MCNC: 1.21 MG/DL — SIGNIFICANT CHANGE UP (ref 0.5–1.3)
EOSINOPHIL # BLD AUTO: 0 K/UL — SIGNIFICANT CHANGE UP (ref 0–0.5)
EOSINOPHIL NFR BLD AUTO: 0 % — SIGNIFICANT CHANGE UP (ref 0–6)
GLUCOSE SERPL-MCNC: 123 MG/DL — HIGH (ref 70–99)
HCT VFR BLD CALC: 54.3 % — HIGH (ref 34.5–45)
HGB BLD-MCNC: 18.4 G/DL — HIGH (ref 11.5–15.5)
LIDOCAIN IGE QN: 246 U/L — SIGNIFICANT CHANGE UP (ref 73–393)
LYMPHOCYTES # BLD AUTO: 1.9 K/UL — SIGNIFICANT CHANGE UP (ref 1–3.3)
LYMPHOCYTES # BLD AUTO: 30.4 % — SIGNIFICANT CHANGE UP (ref 13–44)
MCHC RBC-ENTMCNC: 27.9 PG — SIGNIFICANT CHANGE UP (ref 27–34)
MCHC RBC-ENTMCNC: 33.8 GM/DL — SIGNIFICANT CHANGE UP (ref 32–36)
MCV RBC AUTO: 82.4 FL — SIGNIFICANT CHANGE UP (ref 80–100)
MONOCYTES # BLD AUTO: 0.8 K/UL — SIGNIFICANT CHANGE UP (ref 0–0.9)
MONOCYTES NFR BLD AUTO: 13.1 % — SIGNIFICANT CHANGE UP (ref 2–14)
NEUTROPHILS # BLD AUTO: 3.4 K/UL — SIGNIFICANT CHANGE UP (ref 1.8–7.4)
NEUTROPHILS NFR BLD AUTO: 55.1 % — SIGNIFICANT CHANGE UP (ref 43–77)
PLATELET # BLD AUTO: 209 K/UL — SIGNIFICANT CHANGE UP (ref 150–400)
POTASSIUM SERPL-MCNC: 4 MMOL/L — SIGNIFICANT CHANGE UP (ref 3.5–5.3)
POTASSIUM SERPL-SCNC: 4 MMOL/L — SIGNIFICANT CHANGE UP (ref 3.5–5.3)
PROT SERPL-MCNC: 9.5 G/DL — HIGH (ref 6–8.3)
RBC # BLD: 6.59 M/UL — HIGH (ref 3.8–5.2)
RBC # FLD: 12.2 % — SIGNIFICANT CHANGE UP (ref 10.3–14.5)
SODIUM SERPL-SCNC: 139 MMOL/L — SIGNIFICANT CHANGE UP (ref 135–145)
WBC # BLD: 6.1 K/UL — SIGNIFICANT CHANGE UP (ref 3.8–10.5)
WBC # FLD AUTO: 6.1 K/UL — SIGNIFICANT CHANGE UP (ref 3.8–10.5)

## 2018-12-19 PROCEDURE — 99284 EMERGENCY DEPT VISIT MOD MDM: CPT

## 2018-12-19 PROCEDURE — 93005 ELECTROCARDIOGRAM TRACING: CPT

## 2018-12-19 PROCEDURE — 83690 ASSAY OF LIPASE: CPT

## 2018-12-19 PROCEDURE — 99284 EMERGENCY DEPT VISIT MOD MDM: CPT | Mod: 25

## 2018-12-19 PROCEDURE — 96365 THER/PROPH/DIAG IV INF INIT: CPT

## 2018-12-19 PROCEDURE — 96375 TX/PRO/DX INJ NEW DRUG ADDON: CPT

## 2018-12-19 PROCEDURE — 93010 ELECTROCARDIOGRAM REPORT: CPT

## 2018-12-19 PROCEDURE — 80053 COMPREHEN METABOLIC PANEL: CPT

## 2018-12-19 PROCEDURE — 85027 COMPLETE CBC AUTOMATED: CPT

## 2018-12-19 RX ORDER — ONDANSETRON 8 MG/1
4 TABLET, FILM COATED ORAL ONCE
Qty: 0 | Refills: 0 | Status: COMPLETED | OUTPATIENT
Start: 2018-12-19 | End: 2018-12-19

## 2018-12-19 RX ORDER — HYDROMORPHONE HYDROCHLORIDE 2 MG/ML
2 INJECTION INTRAMUSCULAR; INTRAVENOUS; SUBCUTANEOUS ONCE
Qty: 0 | Refills: 0 | Status: DISCONTINUED | OUTPATIENT
Start: 2018-12-19 | End: 2018-12-19

## 2018-12-19 RX ORDER — SODIUM CHLORIDE 9 MG/ML
1000 INJECTION INTRAMUSCULAR; INTRAVENOUS; SUBCUTANEOUS ONCE
Qty: 0 | Refills: 0 | Status: COMPLETED | OUTPATIENT
Start: 2018-12-19 | End: 2018-12-19

## 2018-12-19 RX ORDER — ONDANSETRON 8 MG/1
1 TABLET, FILM COATED ORAL
Qty: 15 | Refills: 0 | OUTPATIENT
Start: 2018-12-19 | End: 2018-12-23

## 2018-12-19 RX ORDER — FAMOTIDINE 10 MG/ML
20 INJECTION INTRAVENOUS ONCE
Qty: 0 | Refills: 0 | Status: COMPLETED | OUTPATIENT
Start: 2018-12-19 | End: 2018-12-19

## 2018-12-19 RX ORDER — MORPHINE SULFATE 50 MG/1
2 CAPSULE, EXTENDED RELEASE ORAL ONCE
Qty: 0 | Refills: 0 | Status: DISCONTINUED | OUTPATIENT
Start: 2018-12-19 | End: 2018-12-19

## 2018-12-19 RX ADMIN — ONDANSETRON 4 MILLIGRAM(S): 8 TABLET, FILM COATED ORAL at 16:48

## 2018-12-19 RX ADMIN — SODIUM CHLORIDE 1000 MILLILITER(S): 9 INJECTION INTRAMUSCULAR; INTRAVENOUS; SUBCUTANEOUS at 16:48

## 2018-12-19 RX ADMIN — SODIUM CHLORIDE 1000 MILLILITER(S): 9 INJECTION INTRAMUSCULAR; INTRAVENOUS; SUBCUTANEOUS at 17:48

## 2018-12-19 RX ADMIN — FAMOTIDINE 20 MILLIGRAM(S): 10 INJECTION INTRAVENOUS at 17:15

## 2018-12-19 RX ADMIN — FAMOTIDINE 100 MILLIGRAM(S): 10 INJECTION INTRAVENOUS at 16:48

## 2018-12-19 RX ADMIN — HYDROMORPHONE HYDROCHLORIDE 2 MILLIGRAM(S): 2 INJECTION INTRAMUSCULAR; INTRAVENOUS; SUBCUTANEOUS at 16:49

## 2018-12-19 RX ADMIN — HYDROMORPHONE HYDROCHLORIDE 2 MILLIGRAM(S): 2 INJECTION INTRAMUSCULAR; INTRAVENOUS; SUBCUTANEOUS at 17:05

## 2018-12-19 NOTE — ED PROVIDER NOTE - ATTENDING CONTRIBUTION TO CARE
· HPI Objective Statement: 67 y/o F with PMH of depression, anxiety, hypothyroidism, chronic back pain on morphine 90mg BID, macrocytosis and pancytopenia presents to the ED with c/o n/v/d, dehydration x 2 days since she ran out of her morphine pills 2 days ago. Patient reports she had an appointment yesterday with [facility] for refill of her prescription, however she could not make it to the appointment. Also reports back pain She denies CP, SOB, abd pain, urinary symptoms, trauma, or neuro symptoms.   reports she has an appointment with pain management clinic in 1 week    PE General:     NAD, well-nourished, well-appearing  Head:     NC/AT, EOMI, oral mucosa dry  Neck:     trachea midline  Lungs:     CTA b/l, no w/r/r  CVS:     S1S2, RRR, no m/g/r  Abd:    hyperactive BS, s/nt/nd, no organomegaly  Ext:    2+ radial and pedal pulses, no c/c/e  Neuro: AAOx3, no sensory/motor deficits  Dr. Espino:  I have reviewed and discussed with the PA/ resident the case specifics, including the history, physical assessment, evaluation, conclusion, laboratory results, and medical plan. I agree with the contents, and conclusions. I have personally examined, and interviewed the patient.

## 2018-12-19 NOTE — ED ADULT NURSE REASSESSMENT NOTE - NS ED NURSE REASSESS COMMENT FT1
pt is being DC'd. the pt called her neighbor to pick her up. She is currently waiting for them to get her.

## 2018-12-19 NOTE — ED PROVIDER NOTE - NSFOLLOWUPINSTRUCTIONS_ED_ALL_ED_FT
Follow up your elevated liver tests with Family practice clinic as discussed. Follow up with your pain management appointment as scheduled.     Take the prescribed medications as directed     Stay hydrated  Return to the ER if your symptoms worsen, high fevers, severe pain or for any other medical emergencies

## 2018-12-19 NOTE — ED PROVIDER NOTE - PROGRESS NOTE DETAILS
MOUNIKA Fabian: labs reviewed, BUN 43, patient hydrated. LFTs mildly elevated, patient was made aware of this and told to f/u in FP clinic. she denies abd pain, reports her back pain and nausea are improved, no episodes of emesis noted in the ED. Patients nurse from the [facility] is here with her, states she will call to get her appointment with FP clinic tomorrow.  VS improved MOUNIKA Fabian: labs reviewed, BUN 43, patient hydrated. LFTs mildly elevated, patient was made aware of this and told to f/u in FP clinic. she denies abd pain, reports her back pain and nausea are improved, no episodes of emesis noted in the ED. Patients nurse from the [facility] is here with her, states she will call to get her appointment with FP clinic tomorrow.  VS improved. will d/c with percocet and zofran to help with withdrawal symptoms until patient can be seen in FP clinic

## 2018-12-19 NOTE — ED PROVIDER NOTE - NSFOLLOWUPCLINICS_GEN_ALL_ED_FT
Family Practice Clinic  Family Medicine  [address] [place] [id number]  Phone: [phone]  Fax:   Follow Up Time:

## 2018-12-19 NOTE — ED PROVIDER NOTE - PHYSICAL EXAMINATION
AAOx3  patient thin and cachetic   Heart: s1/s2, +tachycardia   Lung: CTA b/l  Abd: soft, NT/ND, no rebound or guarding, NCVAT  Extremity: +mild TTP along lumbar spine, no pedal edema or calf pain,  Skin: +wound to RLE with eschar formation. No surrounding erythema or clinical signs of cellulitis   Neuro: patient moving all extremity equally, no focal neuro deficits noted

## 2018-12-19 NOTE — ED PROVIDER NOTE - OBJECTIVE STATEMENT
69 y/o F with PMH of depression, anxiety, hypothyroidism, chronic back pain on morphine 90mg BID, macrocytosis and pancytopenia presents to the ED with c/o n/v/d, dehydration x 2 days since she ran out of her morphine pills 2 days ago. Patient reports she had an appointment yesterday with family practice clinic for refill of her prescription, however she could not make it to the appointment. Also reports back pain She denies CP, SOB, abd pain, urinary symptoms, trauma, or neuro symptoms.   reports she has an appointment with pain management clinic in 1 week

## 2018-12-19 NOTE — ED ADULT NURSE NOTE - NSIMPLEMENTINTERV_GEN_ALL_ED
Implemented All Fall Risk Interventions:  [place] to call system. Call bell, personal items and telephone within reach. Instruct patient to call for assistance. Room bathroom lighting operational. Non-slip footwear when patient is off stretcher. Physically safe environment: no spills, clutter or unnecessary equipment. Stretcher in lowest position, wheels locked, appropriate side rails in place. Provide visual cue, wrist band, yellow gown, etc. Monitor gait and stability. Monitor for mental status changes and reorient to person, place, and time. Review medications for side effects contributing to fall risk. Reinforce activity limits and safety measures with patient and family.

## 2019-01-17 ENCOUNTER — LABORATORY RESULT (OUTPATIENT)
Age: 69
End: 2019-01-17

## 2019-01-17 ENCOUNTER — APPOINTMENT (OUTPATIENT)
Dept: HEMATOLOGY ONCOLOGY | Facility: CLINIC | Age: 69
End: 2019-01-17
Payer: MEDICARE

## 2019-01-17 VITALS
HEIGHT: 62 IN | HEART RATE: 61 BPM | BODY MASS INDEX: 18.4 KG/M2 | SYSTOLIC BLOOD PRESSURE: 163 MMHG | DIASTOLIC BLOOD PRESSURE: 80 MMHG | WEIGHT: 100 LBS | TEMPERATURE: 97.9 F

## 2019-01-17 LAB
HCT VFR BLD CALC: 39.3 %
HGB BLD-MCNC: 13.1 G/DL
MCHC RBC-ENTMCNC: 27.9 PG
MCHC RBC-ENTMCNC: 33.3 GM/DL
MCV RBC AUTO: 83.7 FL
PLATELET # BLD AUTO: 104 K/UL
RBC # BLD: 4.69 M/UL
RBC # FLD: 13.1 %
WBC # FLD AUTO: 2.1 K/UL

## 2019-01-17 PROCEDURE — 38222 DX BONE MARROW BX & ASPIR: CPT

## 2019-01-17 PROCEDURE — 36415 COLL VENOUS BLD VENIPUNCTURE: CPT

## 2019-01-17 PROCEDURE — 85025 COMPLETE CBC W/AUTO DIFF WBC: CPT

## 2019-01-17 NOTE — PROCEDURE
[Bone Marrow Biopsy] : bone marrow biopsy [Bone Marrow Aspiration] : bone marrow aspiration  [Patient] : the patient [Patient identification verified] : patient identification verified [Laterality verified and correct site marked] : laterality verified and correct site marked [Correct positioning] : correct positioning [Other ___] : [unfilled] [Superior iliac spine was identified] : the superior iliac spine was identified. [Lidocaine was injected and into the periosteum overlying the site.] : Lidocaine was injected and into the periosteum overlying the site. [Aspirate] : aspirate [Cytogenetics] : cytogenetics [FISH] : FISH [Biopsy] : biopsy [Flow Cytometry] : flow cytometry [] : The patient was instructed to remove the bandage the following AM. The patient may bathe. Acetaminophen may be taken for discomfort, as per package directions.If there are any other problems, the patient was instructed to call the office. The patient verbalized understanding, and is aware of the office contact numbers. [Left lateral decibitus position] : left lateral decibitus position [The right posterior iliac crest was prepped with betadine and draped, using sterile technique.] : The right posterior iliac crest was prepped with betadine and draped, using sterile technique. [FreeTextEntry1] : leukocytopenia/ thrombocytopenia

## 2019-01-17 NOTE — DISCUSSION/SUMMARY
[FreeTextEntry1] : Persistent leukopenia and thrombocytopenia; underlying bone marrow disorder cannot be excluded. Prior to considering growth factor support we'll perform bone marrow studies. Patient is agreeable.

## 2019-01-17 NOTE — REASON FOR VISIT
[Bone Marrow Biopsy] : bone marrow biopsy [Bone Marrow Aspiration] : bone marrow aspiration [Formal Caregiver] : formal caregiver [FreeTextEntry2] : leukopenia/ thrombocytopenia

## 2019-01-29 ENCOUNTER — APPOINTMENT (OUTPATIENT)
Age: 69
End: 2019-01-29

## 2019-03-05 ENCOUNTER — LABORATORY RESULT (OUTPATIENT)
Age: 69
End: 2019-03-05

## 2019-03-05 ENCOUNTER — APPOINTMENT (OUTPATIENT)
Age: 69
End: 2019-03-05
Payer: MEDICARE

## 2019-03-05 VITALS
SYSTOLIC BLOOD PRESSURE: 146 MMHG | BODY MASS INDEX: 18.4 KG/M2 | WEIGHT: 100 LBS | HEART RATE: 83 BPM | DIASTOLIC BLOOD PRESSURE: 83 MMHG | TEMPERATURE: 97.9 F | HEIGHT: 62 IN

## 2019-03-05 LAB
HCT VFR BLD CALC: 38.5 %
HGB BLD-MCNC: 13.1 G/DL
MCHC RBC-ENTMCNC: 28.7 PG
MCHC RBC-ENTMCNC: 34 GM/DL
MCV RBC AUTO: 84.3 FL
PLATELET # BLD AUTO: 103 K/UL
RBC # BLD: 4.57 M/UL
RBC # FLD: 14.1 %
WBC # FLD AUTO: 1.2 K/UL

## 2019-03-05 PROCEDURE — 99214 OFFICE O/P EST MOD 30 MIN: CPT | Mod: 25

## 2019-03-05 PROCEDURE — 36415 COLL VENOUS BLD VENIPUNCTURE: CPT

## 2019-03-05 PROCEDURE — 85025 COMPLETE CBC W/AUTO DIFF WBC: CPT

## 2019-03-05 NOTE — ASSESSMENT
[FreeTextEntry1] : Ms. BRUNSON 's questions were answered to her satisfaction. She will return to the office after completion PET/CT.\par

## 2019-03-05 NOTE — REASON FOR VISIT
[Follow-Up Visit] : a follow-up visit for [Formal Caregiver] : formal caregiver [FreeTextEntry2] : neutropenia and thrombocytopenia.

## 2019-03-05 NOTE — HISTORY OF PRESENT ILLNESS
[de-identified] : 69 years old  female with known depression and anxiety, with fluctuant leukopenia and thrombocytopenia. [FreeTextEntry1] : expectant surveillance\par   [de-identified] : Returning for followup, two-month after her bone marrow studies performed January 17, 2019. Pathology consistent with normal cellular marrow with mild erythroid hyperplasia, with left shift, and immunophenotyping evidence of a small CD5 + clonal B-cell population (0.8%) no evidence of disease pathogenetic alterations detected in the MARISABEL 12-13, BCR-ABL, MPL, or CALR.Patient remains severely neutropenic ( WBC 1.2 K , ), but overall stable clinically. Denies B symptoms, and is compliant with medication. She has been off some of her psych medications recently, but hematologic picture has not  changed. No recent hospitalizations; denies bleeding tendency. Anxiety and cachexia unchanged. Follows up with Dr. Fitzgerald- PCP.

## 2019-03-05 NOTE — PHYSICAL EXAM
[Ambulatory and capable of all self care but unable to carry out any work activities] : Status 2- Ambulatory and capable of all self care but unable to carry out any work activities. Up and about more than 50% of waking hours [Thin] : thin [Normal] : grossly intact [de-identified] : cachectic [de-identified] : upper and lower dentures [de-identified] : flat [de-identified] : depressed, teary

## 2019-03-05 NOTE — REVIEW OF SYSTEMS
[Fatigue] : fatigue [Recent Change In Weight] : ~T recent weight change [Cough] : cough [SOB on Exertion] : shortness of breath during exertion [Anxiety] : anxiety [Depression] : depression [Negative] : Neurological [Dysphagia] : no dysphagia [Hoarseness] : no hoarseness [Odynophagia] : no odynophagia [FreeTextEntry2] : cachectic appearing female, depressed and anxious [FreeTextEntry6] : heavy tobacco user [de-identified] : under the care of psychiatry at [facility] [de-identified] : neutropenia, thrombocytopenis

## 2019-03-06 LAB
ALBUMIN SERPL ELPH-MCNC: 4.3 G/DL
ALP BLD-CCNC: 84 U/L
ALT SERPL-CCNC: 26 U/L
ANION GAP SERPL CALC-SCNC: 11 MMOL/L
AST SERPL-CCNC: 41 U/L
BILIRUB SERPL-MCNC: 0.8 MG/DL
BUN SERPL-MCNC: 15 MG/DL
CALCIUM SERPL-MCNC: 9.4 MG/DL
CHLORIDE SERPL-SCNC: 98 MMOL/L
CO2 SERPL-SCNC: 29 MMOL/L
CREAT SERPL-MCNC: 0.9 MG/DL
GLUCOSE SERPL-MCNC: 97 MG/DL
LDH SERPL-CCNC: 206 U/L
POTASSIUM SERPL-SCNC: 4.2 MMOL/L
PROT SERPL-MCNC: 7.6 G/DL
SODIUM SERPL-SCNC: 138 MMOL/L

## 2019-04-08 ENCOUNTER — INPATIENT (INPATIENT)
Facility: HOSPITAL | Age: 69
LOS: 1 days | Discharge: HOME | DRG: 535 | End: 2019-04-10
Attending: HOSPITALIST | Admitting: INTERNAL MEDICINE
Payer: MEDICARE

## 2019-04-08 VITALS
OXYGEN SATURATION: 96 % | RESPIRATION RATE: 18 BRPM | TEMPERATURE: 98 F | SYSTOLIC BLOOD PRESSURE: 147 MMHG | HEIGHT: 64 IN | HEART RATE: 92 BPM | DIASTOLIC BLOOD PRESSURE: 103 MMHG | WEIGHT: 100.09 LBS

## 2019-04-08 DIAGNOSIS — Z98.890 OTHER SPECIFIED POSTPROCEDURAL STATES: Chronic | ICD-10-CM

## 2019-04-08 DIAGNOSIS — S72.002A FRACTURE OF UNSPECIFIED PART OF NECK OF LEFT FEMUR, INITIAL ENCOUNTER FOR CLOSED FRACTURE: ICD-10-CM

## 2019-04-08 LAB
ALBUMIN SERPL ELPH-MCNC: 3.8 G/DL — SIGNIFICANT CHANGE UP (ref 3.3–5)
ALP SERPL-CCNC: 85 U/L — SIGNIFICANT CHANGE UP (ref 40–120)
ALT FLD-CCNC: 41 U/L DA — SIGNIFICANT CHANGE UP (ref 10–45)
ANION GAP SERPL CALC-SCNC: 14 MMOL/L — SIGNIFICANT CHANGE UP (ref 5–17)
ANISOCYTOSIS BLD QL: SLIGHT — SIGNIFICANT CHANGE UP
APTT BLD: 33.9 SEC — SIGNIFICANT CHANGE UP (ref 27.5–36.3)
AST SERPL-CCNC: 45 U/L — HIGH (ref 10–40)
BASOPHILS NFR BLD AUTO: 1 % — SIGNIFICANT CHANGE UP (ref 0–2)
BILIRUB SERPL-MCNC: 1.1 MG/DL — SIGNIFICANT CHANGE UP (ref 0.2–1.2)
BUN SERPL-MCNC: 24 MG/DL — HIGH (ref 7–23)
CALCIUM SERPL-MCNC: 9.8 MG/DL — SIGNIFICANT CHANGE UP (ref 8.4–10.5)
CHLORIDE SERPL-SCNC: 105 MMOL/L — SIGNIFICANT CHANGE UP (ref 96–108)
CO2 SERPL-SCNC: 24 MMOL/L — SIGNIFICANT CHANGE UP (ref 22–31)
CREAT SERPL-MCNC: 0.9 MG/DL — SIGNIFICANT CHANGE UP (ref 0.5–1.3)
EOSINOPHIL NFR BLD AUTO: 2 % — SIGNIFICANT CHANGE UP (ref 0–6)
ETHANOL SERPL-MCNC: <3 MG/DL — SIGNIFICANT CHANGE UP (ref 0–3)
GLUCOSE SERPL-MCNC: 126 MG/DL — HIGH (ref 70–99)
HCT VFR BLD CALC: 44.8 % — SIGNIFICANT CHANGE UP (ref 34.5–45)
HGB BLD-MCNC: 14.8 G/DL — SIGNIFICANT CHANGE UP (ref 11.5–15.5)
INR BLD: 1.18 RATIO — HIGH (ref 0.88–1.16)
LACTATE SERPL-SCNC: 1.3 MMOL/L — SIGNIFICANT CHANGE UP (ref 0.7–2)
LYMPHOCYTES # BLD AUTO: 46 % — HIGH (ref 13–44)
MCHC RBC-ENTMCNC: 29 PG — SIGNIFICANT CHANGE UP (ref 27–34)
MCHC RBC-ENTMCNC: 33.2 GM/DL — SIGNIFICANT CHANGE UP (ref 32–36)
MCV RBC AUTO: 87.4 FL — SIGNIFICANT CHANGE UP (ref 80–100)
MONOCYTES NFR BLD AUTO: 26 % — HIGH (ref 2–14)
NEUTROPHILS NFR BLD AUTO: 17 % — LOW (ref 43–77)
NEUTS BAND # BLD: 8 % — SIGNIFICANT CHANGE UP (ref 0–8)
PLAT MORPH BLD: NORMAL — SIGNIFICANT CHANGE UP
PLATELET # BLD AUTO: 190 K/UL — SIGNIFICANT CHANGE UP (ref 150–400)
POTASSIUM SERPL-MCNC: 3.6 MMOL/L — SIGNIFICANT CHANGE UP (ref 3.5–5.3)
POTASSIUM SERPL-SCNC: 3.6 MMOL/L — SIGNIFICANT CHANGE UP (ref 3.5–5.3)
PROT SERPL-MCNC: 9.1 G/DL — HIGH (ref 6–8.3)
PROTHROM AB SERPL-ACNC: 13.3 SEC — HIGH (ref 10–12.9)
RBC # BLD: 5.13 M/UL — SIGNIFICANT CHANGE UP (ref 3.8–5.2)
RBC # FLD: 12.6 % — SIGNIFICANT CHANGE UP (ref 10.3–14.5)
RBC BLD AUTO: ABNORMAL
SODIUM SERPL-SCNC: 143 MMOL/L — SIGNIFICANT CHANGE UP (ref 135–145)
TSH SERPL-MCNC: 0.85 UIU/ML — SIGNIFICANT CHANGE UP (ref 0.27–4.2)
WBC # BLD: 1.7 K/UL — LOW (ref 3.8–10.5)
WBC # FLD AUTO: 1.7 K/UL — LOW (ref 3.8–10.5)

## 2019-04-08 PROCEDURE — 93010 ELECTROCARDIOGRAM REPORT: CPT

## 2019-04-08 PROCEDURE — 71045 X-RAY EXAM CHEST 1 VIEW: CPT | Mod: 26

## 2019-04-08 PROCEDURE — 72192 CT PELVIS W/O DYE: CPT | Mod: 26

## 2019-04-08 PROCEDURE — 99285 EMERGENCY DEPT VISIT HI MDM: CPT

## 2019-04-08 PROCEDURE — 76376 3D RENDER W/INTRP POSTPROCES: CPT | Mod: 26

## 2019-04-08 PROCEDURE — 73502 X-RAY EXAM HIP UNI 2-3 VIEWS: CPT | Mod: 26,LT

## 2019-04-08 PROCEDURE — 99223 1ST HOSP IP/OBS HIGH 75: CPT

## 2019-04-08 RX ORDER — LEVOTHYROXINE SODIUM 125 MCG
150 TABLET ORAL DAILY
Qty: 0 | Refills: 0 | Status: DISCONTINUED | OUTPATIENT
Start: 2019-04-08 | End: 2019-04-10

## 2019-04-08 RX ORDER — SODIUM CHLORIDE 9 MG/ML
1400 INJECTION INTRAMUSCULAR; INTRAVENOUS; SUBCUTANEOUS ONCE
Qty: 0 | Refills: 0 | Status: COMPLETED | OUTPATIENT
Start: 2019-04-08 | End: 2019-04-08

## 2019-04-08 RX ORDER — ASPIRIN/CALCIUM CARB/MAGNESIUM 324 MG
81 TABLET ORAL DAILY
Qty: 0 | Refills: 0 | Status: DISCONTINUED | OUTPATIENT
Start: 2019-04-08 | End: 2019-04-10

## 2019-04-08 RX ORDER — OXYCODONE AND ACETAMINOPHEN 5; 325 MG/1; MG/1
1 TABLET ORAL EVERY 4 HOURS
Qty: 0 | Refills: 0 | Status: DISCONTINUED | OUTPATIENT
Start: 2019-04-08 | End: 2019-04-10

## 2019-04-08 RX ORDER — MORPHINE SULFATE 50 MG/1
60 CAPSULE, EXTENDED RELEASE ORAL
Qty: 0 | Refills: 0 | Status: DISCONTINUED | OUTPATIENT
Start: 2019-04-08 | End: 2019-04-10

## 2019-04-08 RX ORDER — LOPERAMIDE HCL 2 MG
2 TABLET ORAL THREE TIMES A DAY
Qty: 0 | Refills: 0 | Status: DISCONTINUED | OUTPATIENT
Start: 2019-04-08 | End: 2019-04-10

## 2019-04-08 RX ORDER — MORPHINE SULFATE 50 MG/1
2 CAPSULE, EXTENDED RELEASE ORAL ONCE
Qty: 0 | Refills: 0 | Status: DISCONTINUED | OUTPATIENT
Start: 2019-04-08 | End: 2019-04-08

## 2019-04-08 RX ORDER — NICOTINE POLACRILEX 2 MG
1 GUM BUCCAL DAILY
Qty: 0 | Refills: 0 | Status: DISCONTINUED | OUTPATIENT
Start: 2019-04-08 | End: 2019-04-10

## 2019-04-08 RX ORDER — ALPRAZOLAM 0.25 MG
1 TABLET ORAL THREE TIMES A DAY
Qty: 0 | Refills: 0 | Status: DISCONTINUED | OUTPATIENT
Start: 2019-04-08 | End: 2019-04-10

## 2019-04-08 RX ORDER — TRAMADOL HYDROCHLORIDE 50 MG/1
50 TABLET ORAL EVERY 8 HOURS
Qty: 0 | Refills: 0 | Status: DISCONTINUED | OUTPATIENT
Start: 2019-04-08 | End: 2019-04-10

## 2019-04-08 RX ORDER — DONEPEZIL HYDROCHLORIDE 10 MG/1
1 TABLET, FILM COATED ORAL
Qty: 0 | Refills: 0 | COMMUNITY

## 2019-04-08 RX ORDER — MORPHINE SULFATE 50 MG/1
0 CAPSULE, EXTENDED RELEASE ORAL
Qty: 0 | Refills: 0 | COMMUNITY

## 2019-04-08 RX ORDER — LACTOBACILLUS ACIDOPHILUS 100MM CELL
1 CAPSULE ORAL DAILY
Qty: 0 | Refills: 0 | Status: DISCONTINUED | OUTPATIENT
Start: 2019-04-08 | End: 2019-04-10

## 2019-04-08 RX ORDER — HYDRALAZINE HCL 50 MG
10 TABLET ORAL ONCE
Qty: 0 | Refills: 0 | Status: COMPLETED | OUTPATIENT
Start: 2019-04-08 | End: 2019-04-08

## 2019-04-08 RX ORDER — ENOXAPARIN SODIUM 100 MG/ML
40 INJECTION SUBCUTANEOUS DAILY
Qty: 0 | Refills: 0 | Status: DISCONTINUED | OUTPATIENT
Start: 2019-04-08 | End: 2019-04-10

## 2019-04-08 RX ORDER — ASCORBIC ACID 60 MG
500 TABLET,CHEWABLE ORAL DAILY
Qty: 0 | Refills: 0 | Status: DISCONTINUED | OUTPATIENT
Start: 2019-04-08 | End: 2019-04-10

## 2019-04-08 RX ORDER — MEMANTINE HYDROCHLORIDE 10 MG/1
10 TABLET ORAL
Qty: 0 | Refills: 0 | Status: DISCONTINUED | OUTPATIENT
Start: 2019-04-08 | End: 2019-04-10

## 2019-04-08 RX ORDER — AMLODIPINE BESYLATE 2.5 MG/1
2.5 TABLET ORAL DAILY
Qty: 0 | Refills: 0 | Status: DISCONTINUED | OUTPATIENT
Start: 2019-04-09 | End: 2019-04-10

## 2019-04-08 RX ORDER — INFLUENZA VIRUS VACCINE 15; 15; 15; 15 UG/.5ML; UG/.5ML; UG/.5ML; UG/.5ML
0.5 SUSPENSION INTRAMUSCULAR ONCE
Qty: 0 | Refills: 0 | Status: DISCONTINUED | OUTPATIENT
Start: 2019-04-08 | End: 2019-04-10

## 2019-04-08 RX ADMIN — SODIUM CHLORIDE 1400 MILLILITER(S): 9 INJECTION INTRAMUSCULAR; INTRAVENOUS; SUBCUTANEOUS at 15:32

## 2019-04-08 RX ADMIN — MORPHINE SULFATE 60 MILLIGRAM(S): 50 CAPSULE, EXTENDED RELEASE ORAL at 22:51

## 2019-04-08 RX ADMIN — ENOXAPARIN SODIUM 40 MILLIGRAM(S): 100 INJECTION SUBCUTANEOUS at 21:52

## 2019-04-08 RX ADMIN — MEMANTINE HYDROCHLORIDE 10 MILLIGRAM(S): 10 TABLET ORAL at 21:51

## 2019-04-08 RX ADMIN — MORPHINE SULFATE 60 MILLIGRAM(S): 50 CAPSULE, EXTENDED RELEASE ORAL at 21:51

## 2019-04-08 RX ADMIN — Medication 10 MILLIGRAM(S): at 21:52

## 2019-04-08 RX ADMIN — MORPHINE SULFATE 2 MILLIGRAM(S): 50 CAPSULE, EXTENDED RELEASE ORAL at 19:35

## 2019-04-08 RX ADMIN — Medication 1 MILLIGRAM(S): at 21:52

## 2019-04-08 NOTE — ED PROVIDER NOTE - CLINICAL SUMMARY MEDICAL DECISION MAKING FREE TEXT BOX
68 y/o F with PMH of depression, anxiety, hypothyroidism, chronic back pain on morphine 90mg BID, macrocytosis and pancytopenia sent into the ED by the [facility] for social concerns and evaluation of left hip pain s/p trip and fall last week- Basic labs, xray pelvis/hip, social work intervention 68 y/o F with PMH of depression, anxiety, hypothyroidism, chronic back pain on morphine 90mg BID, macrocytosis and pancytopenia sent into the ED by the [facility] for social concerns and evaluation of left hip pain s/p trip and fall last week- Basic labs, xray pelvis/hip, social work intervention, rehab placement

## 2019-04-08 NOTE — ED PROVIDER NOTE - SKIN WOUND TYPE
ulceration to the right lower shin approx a quarter in size . Erythema to the buttocks region- no ulceration

## 2019-04-08 NOTE — PATIENT PROFILE ADULT - BRADEN MOBILITY
Hide Include Location In Plan Question?: No Include Location In Plan?: Yes Detail Level: Detailed Detail Level: Simple (2) very limited

## 2019-04-08 NOTE — CHART NOTE - NSCHARTNOTEFT_GEN_A_CORE
ARPIT spoke with pt's  Shruthidelvis Hoganofelia [phone] from [facility]. Shruthi reports pt has been with [facility] for over 10 years. Shruthi further reports that another client noticed that pt was not coming out of the apartment and was concerned. Shruthi called police to do a wellness check and brought pt to ED,  states feces on wall, and pt not taking care of herself. Pt also reports she fell in the bathroom. Pt sees Dr. Ruiz for pain management and states she takes 90 mg BID everyday for back pain. Shruthi reports that her friend that was taking her to her pain management appointments passed away and now pt has a hard time getting to these appointments. Shruthi states that because of transportation issues, pt has been without pain medication since last Wednesday. Shruthi states that she will make APS report because she is currently a resident with her agency. ARPIT spoke with PA and MD regarding case. SW awaiting results from X- ray, CT, and labs. SW to coordinate with  Shruthi. ARPIT to follow.

## 2019-04-08 NOTE — ED ADULT NURSE NOTE - OBJECTIVE STATEMENT
Pt presents A&O to person and place.   Presents from [facility] for social admission and rehab s/p mechanical fall x 5 days ago.  As per [facility] case management, pt was found in poor living conditions, unable to care for self and has been bed bound since fall.   No objective signs of injury from fall noted, able to move all extremities but admits to pain to left lower leg when moving, hip and hamstring.   Noted with healed venous ulcer to right lower extremity, shin area, pt states ulcer has been lingering for sometime now from previous injury sustained in the past, being treated with topical ointment by her PCP.  States has remained bed bound due to left hip and leg pain as well as weakness to lower extremities since the fall.   Denies dizziness, SOB, CP, n/v/d/c.   Pt states was unwilling to seek rehab because owns a dog and has no one to care for her dog.

## 2019-04-08 NOTE — ED ADULT NURSE REASSESSMENT NOTE - NS ED NURSE REASSESS COMMENT FT1
Unable to collect urine sample at this time. Fluids infusing, pt stated will let this RN know when ready to provide a Urine sample.   Will continue to monitor further.

## 2019-04-08 NOTE — ED PROVIDER NOTE - PROGRESS NOTE DETAILS
MOUNIKA Jj- social work made aware of situation- call back with blood work results MOUNIKA Jj- WBC 1.7- will initiate a sepsis work up PA Tiberio- Ct scan revealing minimally displaced pubic rami fx- will admit for sub-actue rehab- Discussed with Dr. Heaton.

## 2019-04-08 NOTE — ED ADULT TRIAGE NOTE - CHIEF COMPLAINT QUOTE
sent for evaluation of a fall and left hip pain, on Morphine 120 mg at home has run out. As per EMS she was found in uncleanly home with urine and feces all over the apartment.

## 2019-04-08 NOTE — H&P ADULT - NSHPLABSRESULTS_GEN_ALL_CORE
14.8   1.7   )-----------( 190      ( 08 Apr 2019 13:40 )             44.8       04-08    143  |  105  |  24<H>  ----------------------------<  126<H>  3.6   |  24  |  0.90    Ca    9.8      08 Apr 2019 13:40    TPro  9.1<H>  /  Alb  3.8  /  TBili  1.1  /  DBili  x   /  AST  45<H>  /  ALT  41  /  AlkPhos  85  04-08      PT/INR - ( 08 Apr 2019 16:00 )   PT: 13.3 sec;   INR: 1.18 ratio         PTT - ( 08 Apr 2019 16:00 )  PTT:33.9 sec          < from: Xray Chest 1 View-PORTABLE IMMEDIATE (04.08.19 @ 15:26) >    Impression: No evidence for acute pulmonary infiltrate, pleural effusion,   or pneumothorax. Skinfold on the left.    The trachea is midline.     The cardiac silhouette is within normal limits.     No pulmonary vascular congestion.    Previously noted NG tube has been removed. Otherwise stable examination.    < end of copied text >    < from: CT 3D Reconstruct w/o Workstation (04.08.19 @ 14:56) >    IMPRESSION: Acute fractures of the left pubis, as above.    < end of copied text >

## 2019-04-08 NOTE — H&P ADULT - HISTORY OF PRESENT ILLNESS
70 y/o F with PMH hypothyroid, chronic back pain following with pain management presents with acute on chronic pain "all over". Pt states that she fell while she was walking her dog about 5 days ago and hurt her backside, but didn't do anything about it. The pain gradually got worse so she came in to the ED. Pt states she tripped and feel, she denies any dizziness, chest pain, palpatations, etc. She saw her PCP Dr. Fitzgerald last week for medication refills, and her pain management doctor for a refill of her morphine for which she takes for over 20 years for osteoporosis  osteoarthritis

## 2019-04-08 NOTE — ED PROVIDER NOTE - ATTENDING CONTRIBUTION TO CARE
andreina with [facility] [employer]. pt with unlivable conditions at home. reports a fall. she actually can bear some weight s/p fall but notes severe pain and is on a great dose of morphine, chronically. pt sent in because her home is unlivable and the patient is, indeed, poorly kept, has feces on the body, terrible hygeine and no one to care for her. She is present alone in the ED> I performed a face to face bedside interview with patient regarding history of present illness, review of symptoms and past medical history. I completed an independent physical exam.  I have discussed the patient's plan of care with Physician Assistant (PA). I agree with note as stated above, having amended the EMR as needed to reflect my findings.   This includes History of Present Illness, HIV, Past Medical/Surgical/Family/Social History, Allergies and Home Medications, Review of Systems, Physical Exam, and any Progress Notes during the time I functioned as the attending physician for this patient.    After noting wbc of 1.7, pt with short, rapid breaths (tachypnea) of 34/min , will add sepsis protocol although no noted fever, tachycardia or source. plan is for admission. will contact sw to make aware.

## 2019-04-08 NOTE — H&P ADULT - NSHPPHYSICALEXAM_GEN_ALL_CORE
PHYSICAL EXAM:  GENERAL: appears in pain  HEAD:  Atraumatic, Normocephalic  NECK: Supple, No JVD  CHEST/LUNG: Clear to auscultation bilaterally; No rales, rhonchi, wheezing, or rubs  HEART: Regular rate and rhythm; S1/S2, No murmurs, rubs, or gallops  ABDOMEN: Soft, Nontender, Nondistended; Bowel sounds present  EXTREMITIES:  2+ Peripheral Pulses, No clubbing, cyanosis, or edema  SKIN: Warm, Intact  MSK: limited exam 2/2 pain

## 2019-04-08 NOTE — ED PROVIDER NOTE - OBJECTIVE STATEMENT
69 y/o F with PMH of depression, anxiety, hypothyroidism, chronic back pain on morphine 90mg BID, macrocytosis and pancytopenia presents to the ED with c/o n/v/d, dehydration x 2 days since she ran out of her morphine pills 2 days ago. According to EMS her place was covered in feces and is unlivable. CM from the [facility] recommending adult protective services be called.  She denies CP, SOB, abd pain, urinary symptoms, trauma, or neuro symptoms.   PMD: Dr. Bright    [facility] [phone] 67 y/[facility] F with PMH of depression, anxiety, hypothyroidism, chronic back pain on morphine 90mg BID, macrocytosis and pancytopenia sent into the ED [place] the [facility] [place] [place] social concerns. According to EMS her place was covered in feces and is unlivable. States she fell last week and hurt her left hip and since has decreased mobility although she did bear weight and walk with EMS. [place] [facility] [place] recommending adult protective services be called.  She denies CP, SOB, abd pain, urinary symptoms, trauma, or neuro symptoms.   PMD: Dr. Bright    from [facility]  Shruthi Caal [phone] 69 y/o F with PMH of depression, anxiety, hypothyroidism, chronic back pain on morphine 90mg BID, macrocytosis and pancytopenia sent into the ED by the [facility] for social concerns. According to EMS her place was covered in feces and is unlivable. States she fell last week walking her dog and hurt her left hip and since has decreased mobility although she can bear weight and walk with EMS. CM from the [facility] recommending adult protective services be called.  Patient currently denies fever, chills, chest pain, abdominal pain, shortness of breath, urinary symptoms, numbness, tingling. When Rehab/detox was brought up she absolutely refused as "she cannot leave her dog".   PMD: Dr. Bright    [facility] [phone] 69 y/o F with PMH of depression, anxiety, hypothyroidism, chronic back pain on morphine 90mg BID, macrocytosis and pancytopenia sent into the ED by the [facility] for social concerns. According to EMS her place was covered in feces and is unlivable. States she fell last week walking her dog and hurt her left hip and since has decreased mobility although she can bear some weight and walk holding on to something. CM from the [facility] recommending adult protective services be called.  Patient currently denies fever, chills, chest pain, abdominal pain, shortness of breath, urinary symptoms, numbness, tingling.   PMD: Lia  Pain management: Dr. Bright    from [facility] Christin Caal [phone]

## 2019-04-08 NOTE — PATIENT PROFILE ADULT - NSTOBACCOCESSATIONEDU6_GEN_A_NUR
Date of Service: 01/10/2018    PREOPERATIVE DIAGNOSIS:  Lumbar stenosis with radiculopathy.    POSTOPERATIVE DIAGNOSIS:  Lumbar stenosis with radiculopathy.    PROCEDURE:  Bilateral L5-S1 transforaminal epidural steroid injection, CPT code 05812, with a 50 modifier.    SURGEON:  Yasemin Pittman MD.    ASSISTANT:  None.    ANESTHESIA:  Local.    INDICATIONS FOR THE PROCEDURE:  The patient is a 64-year-old female with back and bilateral lower extremity pain.  She has lumbar stenosis at L4-L5 and wishes to proceed with an epidural steroid injection.  Risks, benefits, alternatives were discussed.  Signed consent obtained.    DESCRIPTION OF PROCEDURE:  The patient was positively identified in holding area and given 1 gram of IV Ancef.  She was taken to the operating room and placed in a prone position.  Back was visualized with fluoroscopy and was prepped and draped in the usual sterile manner, 1% Lidocaine without Epinephrine or preservative was used to anesthetize the subcutaneous tissues, 25 gauge 3-1/2 spinal needles were inserted inferior and lateral to the L5 pedicles bilaterally.  These were inserted into the foramens and visualized in both the AP and lateral projections.  A small amount of radiopaque dye was injected for localization purposes.  No vascular uptake was noted, 4 mg of Dexamethasone and 1 mL of 1% Lidocaine without Epinephrine or preservative was injected bilaterally.  Needles were withdrawn.  Skin was cleansed.  Patient was taken to recovery room in stable condition.  She tolerated the procedure well without apparent complication.      Dictated By: Yasemin Pittman MD  Signing Provider: Yasemin Pittman MD    PS/MADIL ([id number])  DD: 01/10/2018 12:09:23 TD: 01/10/2018 12:50:01    Copy Sent To:   
Use the 5 A's (Ask, Advise, Assess, Assist, Arrange)

## 2019-04-08 NOTE — ED ADULT NURSE NOTE - NSIMPLEMENTINTERV_GEN_ALL_ED
Implemented All Fall with Harm Risk Interventions:  [place] to call system. Call bell, personal items and telephone within reach. Instruct patient to call for assistance. Room bathroom lighting operational. Non-slip footwear when patient is off stretcher. Physically safe environment: no spills, clutter or unnecessary equipment. Stretcher in lowest position, wheels locked, appropriate side rails in place. Provide visual cue, wrist band, yellow gown, etc. Monitor gait and stability. Monitor for mental status changes and reorient to person, place, and time. Review medications for side effects contributing to fall risk. Reinforce activity limits and safety measures with patient and family. Provide visual clues: red socks.

## 2019-04-08 NOTE — ED ADULT TRIAGE NOTE - ARRIVAL INFO ADDITIONAL COMMENTS
spoke with Coordinator at [facility] regarding pt safety. She lives alone and has no family. Elyssa Hernandez [phone]

## 2019-04-08 NOTE — H&P ADULT - ASSESSMENT
68 y/o F with PMH chronic back pain 2/2 OA, hypothyroid, anxiety presenting with recent fall and acute on chronic back pain       #Acute left pubis fracture  pain control  PT/OT/acute rehab eval     #Leukopenia  patient follows Dr. Patel, had a bone marrow biopsy and was told "everything is fine"  outpatient follow up     #Hypothyroid  continue synthroid    #Chronic back pain  continue morphine, confirmed with pharmacy [facility]    IMPROVE VTE Individual Risk Assessment          RISK                                                          Points  [  ] Previous VTE                                                3  [  ] Thrombophilia                                             2  [  ] Lower limb paralysis                                   2        (unable to hold up >15 seconds)    [  ] Current Cancer                                             2         (within 6 months)  [x  ] Immobilization > 24 hrs                              1  [  ] ICU/CCU stay > 24 hours                             1  [x  ] Age > 60                                                         1    IMPROVE VTE Score: 2    lovenox 70 y/o F with PMH chronic back pain 2/2 OA, hypothyroid, anxiety presenting with recent fall and acute on chronic back pain       #Acute left pubis fracture  pain control  PT/OT/acute rehab eval     #Neutropenia  no signs of infection  patient follows Dr. Patel, had a bone marrow biopsy and was told "everything is fine"  outpatient follow up     #Hypothyroid  continue synthroid    #Chronic back pain  continue morphine, confirmed with pharmacy [facility]    IMPROVE VTE Individual Risk Assessment          RISK                                                          Points  [  ] Previous VTE                                                3  [  ] Thrombophilia                                             2  [  ] Lower limb paralysis                                   2        (unable to hold up >15 seconds)    [  ] Current Cancer                                             2         (within 6 months)  [x  ] Immobilization > 24 hrs                              1  [  ] ICU/CCU stay > 24 hours                             1  [x  ] Age > 60                                                         1    IMPROVE VTE Score: 2    lovenox

## 2019-04-09 DIAGNOSIS — J96.01 ACUTE RESPIRATORY FAILURE WITH HYPOXIA: ICD-10-CM

## 2019-04-09 DIAGNOSIS — S32.592A OTHER SPECIFIED FRACTURE OF LEFT PUBIS, INITIAL ENCOUNTER FOR CLOSED FRACTURE: ICD-10-CM

## 2019-04-09 DIAGNOSIS — D70.9 NEUTROPENIA, UNSPECIFIED: ICD-10-CM

## 2019-04-09 DIAGNOSIS — M81.0 AGE-RELATED OSTEOPOROSIS WITHOUT CURRENT PATHOLOGICAL FRACTURE: ICD-10-CM

## 2019-04-09 DIAGNOSIS — L97.911 NON-PRESSURE CHRONIC ULCER OF UNSPECIFIED PART OF RIGHT LOWER LEG LIMITED TO BREAKDOWN OF SKIN: ICD-10-CM

## 2019-04-09 DIAGNOSIS — M54.5 LOW BACK PAIN: ICD-10-CM

## 2019-04-09 DIAGNOSIS — E03.9 HYPOTHYROIDISM, UNSPECIFIED: ICD-10-CM

## 2019-04-09 DIAGNOSIS — J44.1 CHRONIC OBSTRUCTIVE PULMONARY DISEASE WITH (ACUTE) EXACERBATION: ICD-10-CM

## 2019-04-09 LAB
HCT VFR BLD CALC: 38.8 % — SIGNIFICANT CHANGE UP (ref 34.5–45)
HCV AB S/CO SERPL IA: 8.26 S/CO — HIGH (ref 0–0.99)
HCV AB SERPL-IMP: REACTIVE
HGB BLD-MCNC: 12.7 G/DL — SIGNIFICANT CHANGE UP (ref 11.5–15.5)
MCHC RBC-ENTMCNC: 28.8 PG — SIGNIFICANT CHANGE UP (ref 27–34)
MCHC RBC-ENTMCNC: 32.8 GM/DL — SIGNIFICANT CHANGE UP (ref 32–36)
MCV RBC AUTO: 87.8 FL — SIGNIFICANT CHANGE UP (ref 80–100)
PLATELET # BLD AUTO: 166 K/UL — SIGNIFICANT CHANGE UP (ref 150–400)
RBC # BLD: 4.42 M/UL — SIGNIFICANT CHANGE UP (ref 3.8–5.2)
RBC # FLD: 12.7 % — SIGNIFICANT CHANGE UP (ref 10.3–14.5)
WBC # BLD: 1.6 K/UL — LOW (ref 3.8–10.5)
WBC # FLD AUTO: 1.6 K/UL — LOW (ref 3.8–10.5)

## 2019-04-09 PROCEDURE — 99233 SBSQ HOSP IP/OBS HIGH 50: CPT

## 2019-04-09 PROCEDURE — 71045 X-RAY EXAM CHEST 1 VIEW: CPT | Mod: 26

## 2019-04-09 RX ORDER — IPRATROPIUM/ALBUTEROL SULFATE 18-103MCG
3 AEROSOL WITH ADAPTER (GRAM) INHALATION ONCE
Qty: 0 | Refills: 0 | Status: COMPLETED | OUTPATIENT
Start: 2019-04-09 | End: 2019-04-09

## 2019-04-09 RX ORDER — IPRATROPIUM/ALBUTEROL SULFATE 18-103MCG
3 AEROSOL WITH ADAPTER (GRAM) INHALATION EVERY 6 HOURS
Qty: 0 | Refills: 0 | Status: DISCONTINUED | OUTPATIENT
Start: 2019-04-09 | End: 2019-04-10

## 2019-04-09 RX ADMIN — Medication 3 MILLILITER(S): at 21:48

## 2019-04-09 RX ADMIN — Medication 1 PATCH: at 19:43

## 2019-04-09 RX ADMIN — Medication 1 MILLIGRAM(S): at 22:54

## 2019-04-09 RX ADMIN — Medication 3 MILLILITER(S): at 06:13

## 2019-04-09 RX ADMIN — Medication 2 MILLIGRAM(S): at 00:10

## 2019-04-09 RX ADMIN — MEMANTINE HYDROCHLORIDE 10 MILLIGRAM(S): 10 TABLET ORAL at 05:40

## 2019-04-09 RX ADMIN — Medication 125 MILLIGRAM(S): at 06:11

## 2019-04-09 RX ADMIN — Medication 3 MILLILITER(S): at 09:24

## 2019-04-09 RX ADMIN — Medication 1 TABLET(S): at 12:32

## 2019-04-09 RX ADMIN — ENOXAPARIN SODIUM 40 MILLIGRAM(S): 100 INJECTION SUBCUTANEOUS at 12:34

## 2019-04-09 RX ADMIN — AMLODIPINE BESYLATE 2.5 MILLIGRAM(S): 2.5 TABLET ORAL at 05:40

## 2019-04-09 RX ADMIN — Medication 40 MILLIGRAM(S): at 17:23

## 2019-04-09 RX ADMIN — MORPHINE SULFATE 60 MILLIGRAM(S): 50 CAPSULE, EXTENDED RELEASE ORAL at 22:00

## 2019-04-09 RX ADMIN — MORPHINE SULFATE 60 MILLIGRAM(S): 50 CAPSULE, EXTENDED RELEASE ORAL at 20:58

## 2019-04-09 RX ADMIN — Medication 1 MILLIGRAM(S): at 13:51

## 2019-04-09 RX ADMIN — Medication 81 MILLIGRAM(S): at 12:32

## 2019-04-09 RX ADMIN — Medication 1 MILLIGRAM(S): at 05:40

## 2019-04-09 RX ADMIN — Medication 3 MILLILITER(S): at 16:17

## 2019-04-09 RX ADMIN — Medication 1 PATCH: at 12:32

## 2019-04-09 RX ADMIN — Medication 500 MILLIGRAM(S): at 12:32

## 2019-04-09 RX ADMIN — MORPHINE SULFATE 60 MILLIGRAM(S): 50 CAPSULE, EXTENDED RELEASE ORAL at 09:20

## 2019-04-09 RX ADMIN — MORPHINE SULFATE 60 MILLIGRAM(S): 50 CAPSULE, EXTENDED RELEASE ORAL at 10:20

## 2019-04-09 RX ADMIN — MEMANTINE HYDROCHLORIDE 10 MILLIGRAM(S): 10 TABLET ORAL at 17:23

## 2019-04-09 RX ADMIN — Medication 150 MICROGRAM(S): at 05:40

## 2019-04-09 NOTE — OCCUPATIONAL THERAPY INITIAL EVALUATION ADULT - ADDITIONAL COMMENTS
Pt lives alone in an APT with 1-3 JARED and 1 HR (unclear at this time), elevator access within. Pt reports utilizing tub with grab bars and commode to sit while showering. Pt has regular toilet with grab bar and no other DME. Pt does not drive and was independent in all ADLs/IADLs PTA.

## 2019-04-09 NOTE — OCCUPATIONAL THERAPY INITIAL EVALUATION ADULT - PERTINENT HX OF CURRENT PROBLEM, REHAB EVAL
Pt is a 70 y/o female with PMH of chronic back pain and pain management s/p fall while walking her dog about 6 days a ago and resulting in left hip pain

## 2019-04-09 NOTE — PROGRESS NOTE ADULT - PROBLEM SELECTOR PLAN 2
possibly idiopathic, pt with no signs of infection, followed by Dr. Patel as outpatient, plan for outpatient follow up

## 2019-04-09 NOTE — PROVIDER CONTACT NOTE (OTHER) - BACKGROUND
Pt admitted last night following a fall 5 days ago at home.  Pt is a smoker of 3-4 ppd, and states she recently (last week) quit.  Arrived on unit last evening with nicotine patches on both arms.

## 2019-04-09 NOTE — PROGRESS NOTE ADULT - SUBJECTIVE AND OBJECTIVE BOX
EVENTS: Called to bedside by RN for acute respiratory distress. Patient found to be in respiratory distress and hypoxic with SPO2 78% on 2lpm NC. Lung exam with barrel chest, accessory muscle use, nasal flaring, diffuse severe bilateral inspiratory and expiratory wheezing.     VS:   HR 89 bpm,   /105,   RR 26 breaths/min,   SpO2 78%    RT paged, started on 100% NRB. Patient is an active smoker reportedly 3-4ppd, CXR c/w COPD without acute infiltrate. Given STAT duoneb and solu-medrol 125mg IVP. Respiratory status improved following bronchodilators and steroids, weaned back to 2lpm NC with SpO2 92%.

## 2019-04-09 NOTE — PROGRESS NOTE ADULT - SUBJECTIVE AND OBJECTIVE BOX
Patient is a 69y old  Female who presents with a chief complaint of Fall, back pain (09 Apr 2019 08:24)  No acute complaints      Patient seen and examined at bedside.    ALLERGIES:  Talwin (Unknown)        Vital Signs Last 24 Hrs  T(F): 97.6 (09 Apr 2019 08:27), Max: 100 (08 Apr 2019 15:34)  HR: 74 (09 Apr 2019 08:27) (62 - 92)  BP: 115/69 (09 Apr 2019 08:27) (115/69 - 176/89)  RR: 15 (09 Apr 2019 08:27) (15 - 26)  SpO2: 96% (09 Apr 2019 09:25) (78% - 100%)  I&O's Summary    08 Apr 2019 07:01  -  09 Apr 2019 07:00  --------------------------------------------------------  IN: 240 mL / OUT: 0 mL / NET: 240 mL      MEDICATIONS:  ALBUTerol/ipratropium for Nebulization 3 milliLiter(s) Nebulizer every 6 hours  ALPRAZolam 1 milliGRAM(s) Oral three times a day  amLODIPine   Tablet 2.5 milliGRAM(s) Oral daily  ascorbic acid 500 milliGRAM(s) Oral daily  aspirin  chewable 81 milliGRAM(s) Oral daily  enoxaparin Injectable 40 milliGRAM(s) SubCutaneous daily  influenza   Vaccine 0.5 milliLiter(s) IntraMuscular once  lactobacillus acidophilus 1 Tablet(s) Oral daily  levothyroxine 150 MICROGram(s) Oral daily  loperamide 2 milliGRAM(s) Oral three times a day PRN  memantine 10 milliGRAM(s) Oral two times a day  methylPREDNISolone sodium succinate Injectable 40 milliGRAM(s) IV Push every 12 hours  morphine ER Tablet 60 milliGRAM(s) Oral two times a day  nicotine - 21 mG/24Hr(s) Patch 1 patch Transdermal daily  oxyCODONE    5 mG/acetaminophen 325 mG 1 Tablet(s) Oral every 4 hours PRN  traMADol 50 milliGRAM(s) Oral every 8 hours PRN      PHYSICAL EXAM:  General: NAD, A/O x 3  ENT: MMM  Neck: Supple, No JVD  Lungs: Clear to auscultation bilaterally  Cardio: RRR, S1/S2, No murmurs  Abdomen: Soft, Nontender, Nondistended; Bowel sounds present  Extremities: No cyanosis, No edema, right shin ulcer    LABS:                        12.7   1.6   )-----------( 166      ( 09 Apr 2019 07:00 )             38.8     04-08    143  |  105  |  24  ----------------------------<  126  3.6   |  24  |  0.90    Ca    9.8      08 Apr 2019 13:40    TPro  9.1  /  Alb  3.8  /  TBili  1.1  /  DBili  x   /  AST  45  /  ALT  41  /  AlkPhos  85  04-08    eGFR if Non African American: 66 mL/min/1.73M2 (04-08-19 @ 13:40)  eGFR if : 77 mL/min/1.73M2 (04-08-19 @ 13:40)    PT/INR - ( 08 Apr 2019 16:00 )   PT: 13.3 sec;   INR: 1.18 ratio         PTT - ( 08 Apr 2019 16:00 )  PTT:33.9 sec  Lactate, Blood: 1.3 mmol/L (04-08 @ 16:00)          TSH 0.85   TSH with FT4 reflex --  Total T3 --        CAPILLARY BLOOD GLUCOSE                RADIOLOGY & ADDITIONAL TESTS:    < from: CT 3D Reconstruct w/o Workstation (04.08.19 @ 14:56) >    EXAM:  CT PELVIS BONY ONLY    EXAM:  CT 3D RECONSTRUC KATT JORDAN      PROCEDURE DATE:  04/08/2019        INTERPRETATION:  EXAMINATION: CT of the pelvis without contrast    CLINICAL INFORMATION: Left hip pain.    TECHNIQUE: Axial CT images were obtained through the pelvis. Coronal and   sagittal reformatted images were made. 3-D reconstruction images were   performed on an independent workstation.    FINDINGS: There is an acute minimally displaced fracture of the medial   aspect of the left superior pubic ramus which extends into the pubic body   and medial aspect of the left inferior pubic ramus. There is adjacent   edema in the left-sided adductor muscles. No other acute fractures are   demonstrated. The patient is status post ORIF of a right   intertrochanteric femur fracture with a dynamic hip screw. The hardware   is intact. The hip joint spaces are maintained. There is lower lumbar   spondylosis.    IMPRESSION: Acute fractures of the left pubis, as above.                  MARIA ELENA WIN   This document has been electronically signed. Apr 8 2019  3:26PM    < end of copied text >          < from: Xray Hip w/ Pelvis 2 or 3 Views, Left (04.08.19 @ 14:12) >  XAM:  XR HIP WITH PELV 2-3V LT      PROCEDURE DATE:  04/08/2019        INTERPRETATION:  Left hip with full pelvic view. Patient has local pain   after a fall one week ago. 3 views obtained.    Hip pinning on the right with good alignment again noted.    Hips appear free of degeneration.    On one view question is raised of a possible nondisplaced fracture of the   left ischio pubic ramus in addition there is irregularity of the medial   left pubic ramus suggesting fracture. These findings appear new since CAT   scan June 24, 2016.    In addition there is a zone of lucency which is well defined in the   intertrochanteric area of the left hip not evident on CAT scan.      IMPRESSION: Question new lytic stone in the intertrochanteric area of   left hip.    Possible nondisplaced fracture left ischio pubic ramus. Fracture of the   medial left pubic ramus.    CAT scan recommended. Case discussed with Dr. Roberts.                  ARLET RODRIGUEZ M.D., ATTENDING RADIOLOGIST  This document has been electronically signed. Apr 8 2019  2:24PM    < end of copied text >  Care Discussed with Consultants/Other Providers:

## 2019-04-09 NOTE — PROGRESS NOTE ADULT - ASSESSMENT
left pubis fracture    WBAT PT/OT EVAL No Surgical Intervention at this time. Manage per medical team.

## 2019-04-09 NOTE — PROGRESS NOTE ADULT - PROBLEM SELECTOR PLAN 5
pt with right lower extremity ulcer will get plastics consult for possible debridement, good pedal pulses palpated

## 2019-04-09 NOTE — PROGRESS NOTE ADULT - PROBLEM SELECTOR PLAN 1
imaging reviewed - acute fracture of left pubis  pt/ot, pmr consult, pain management imaging reviewed - acute fracture of left pubis, ortho consult  pt/ot, pmr consult, pain management

## 2019-04-09 NOTE — PROVIDER CONTACT NOTE (OTHER) - ACTION/TREATMENT ORDERED:
Solumedrol 125mg ordered and given, Duoneb ud stat ordered and given and then placed on q6h duonebs.  PCXR done.  Pt placed on nc 4 liters after duoneb.  Sats now 94% on 4 liters.  Feels much better

## 2019-04-09 NOTE — PROGRESS NOTE ADULT - SUBJECTIVE AND OBJECTIVE BOX
Patient is a 69y old  Femalle s/p fall.  further diagnostic workup which includes ct scan of pelvis revealed left  pubis fx. Pt seen and examined and films reviewed by Dr Saldivar.  This is a nonsurgical case which requires conservative treatment to include PT/OT and pain management. Pt is WBAT. No further orthopedic intervention at this time per Dr saldivar.

## 2019-04-09 NOTE — PROGRESS NOTE ADULT - ATTENDING COMMENTS
I have personally seen and examined patient on the above date.  I discussed the case with Ms. Castillo and I agree with findings and plan as detailed per note above, which I have amended where appropriate.

## 2019-04-09 NOTE — PROGRESS NOTE ADULT - ASSESSMENT
69 female with chronic back pain 2/2 oa, hypothyroid, anxiety presents s/p recent fall admitted with acute left pubic fracture

## 2019-04-09 NOTE — PROGRESS NOTE ADULT - ASSESSMENT
A/P: 68 yo female active smoker, hypothyroid, chronic pain, who presents s/p mechanical fall found to have pubic rami fracture, now with acute hypoxemic respiratory failure secondary to COPD exacerbation    - Continue bronchodilators q6h  - Will continue IV steroids for now, however anticipate a quick taper in the setting of underlying osteoporosis   - Supplemental oxygen, titrate to maintain SpO2 >88%  - Continue NRT, patient informed of diagnosis of COPD and that she needs to quit smoking, patient agrees and states she will stop smoking.   - The rest of plan of care per primary team and orthopedics       CRITICAL CARE TIME SPENT: 30 minutes assessing presenting problems of acute illness, which pose high probability of life threatening deterioration or end organ damage/dysfunction, as well as medical decision making including initiating plan of care, reviewing data, reviewing radiologic exams, discussing with multidisciplinary team, non-inclusive of procedures performed, discussing goals of care with patient.

## 2019-04-09 NOTE — PROVIDER CONTACT NOTE (OTHER) - SITUATION
Pt given am meds and started to cough shortly after taking them.  Pt suddenly c/o difficulty breathing, feeling "sweaty",.  Pulse ox on r/a  78%.  /105,HR 89.

## 2019-04-10 ENCOUNTER — TRANSCRIPTION ENCOUNTER (OUTPATIENT)
Age: 69
End: 2019-04-10

## 2019-04-10 VITALS — WEIGHT: 119.93 LBS

## 2019-04-10 DIAGNOSIS — E46 UNSPECIFIED PROTEIN-CALORIE MALNUTRITION: ICD-10-CM

## 2019-04-10 LAB
ANION GAP SERPL CALC-SCNC: 9 MMOL/L — SIGNIFICANT CHANGE UP (ref 5–17)
BASOPHILS # BLD AUTO: 0 K/UL — SIGNIFICANT CHANGE UP (ref 0–0.2)
BASOPHILS NFR BLD AUTO: 1 % — SIGNIFICANT CHANGE UP (ref 0–2)
BUN SERPL-MCNC: 24 MG/DL — HIGH (ref 7–23)
CALCIUM SERPL-MCNC: 9.2 MG/DL — SIGNIFICANT CHANGE UP (ref 8.4–10.5)
CHLORIDE SERPL-SCNC: 107 MMOL/L — SIGNIFICANT CHANGE UP (ref 96–108)
CO2 SERPL-SCNC: 24 MMOL/L — SIGNIFICANT CHANGE UP (ref 22–31)
CREAT SERPL-MCNC: 0.79 MG/DL — SIGNIFICANT CHANGE UP (ref 0.5–1.3)
EOSINOPHIL # BLD AUTO: 0 K/UL — SIGNIFICANT CHANGE UP (ref 0–0.5)
EOSINOPHIL NFR BLD AUTO: 0.1 % — SIGNIFICANT CHANGE UP (ref 0–6)
GLUCOSE SERPL-MCNC: 107 MG/DL — HIGH (ref 70–99)
HCT VFR BLD CALC: 31.3 % — LOW (ref 34.5–45)
HGB BLD-MCNC: 10.4 G/DL — LOW (ref 11.5–15.5)
LYMPHOCYTES # BLD AUTO: 0.9 K/UL — LOW (ref 1–3.3)
LYMPHOCYTES # BLD AUTO: 49.8 % — HIGH (ref 13–44)
MCHC RBC-ENTMCNC: 28.7 PG — SIGNIFICANT CHANGE UP (ref 27–34)
MCHC RBC-ENTMCNC: 33.2 GM/DL — SIGNIFICANT CHANGE UP (ref 32–36)
MCV RBC AUTO: 86.2 FL — SIGNIFICANT CHANGE UP (ref 80–100)
MONOCYTES # BLD AUTO: 0.3 K/UL — SIGNIFICANT CHANGE UP (ref 0–0.9)
MONOCYTES NFR BLD AUTO: 15.6 % — HIGH (ref 1–9)
NEUTROPHILS # BLD AUTO: 0.6 K/UL — LOW (ref 1.8–7.4)
NEUTROPHILS NFR BLD AUTO: 33.5 % — LOW (ref 43–77)
PLATELET # BLD AUTO: 141 K/UL — LOW (ref 150–400)
POTASSIUM SERPL-MCNC: 3.7 MMOL/L — SIGNIFICANT CHANGE UP (ref 3.5–5.3)
POTASSIUM SERPL-SCNC: 3.7 MMOL/L — SIGNIFICANT CHANGE UP (ref 3.5–5.3)
RBC # BLD: 3.63 M/UL — LOW (ref 3.8–5.2)
RBC # FLD: 12.3 % — SIGNIFICANT CHANGE UP (ref 10.3–14.5)
SODIUM SERPL-SCNC: 140 MMOL/L — SIGNIFICANT CHANGE UP (ref 135–145)
WBC # BLD: 1.9 K/UL — LOW (ref 3.8–10.5)
WBC # FLD AUTO: 1.9 K/UL — LOW (ref 3.8–10.5)

## 2019-04-10 PROCEDURE — 94640 AIRWAY INHALATION TREATMENT: CPT

## 2019-04-10 PROCEDURE — 73502 X-RAY EXAM HIP UNI 2-3 VIEWS: CPT

## 2019-04-10 PROCEDURE — 99239 HOSP IP/OBS DSCHRG MGMT >30: CPT

## 2019-04-10 PROCEDURE — 73590 X-RAY EXAM OF LOWER LEG: CPT | Mod: 26,RT

## 2019-04-10 PROCEDURE — 80307 DRUG TEST PRSMV CHEM ANLYZR: CPT

## 2019-04-10 PROCEDURE — 99285 EMERGENCY DEPT VISIT HI MDM: CPT | Mod: 25

## 2019-04-10 PROCEDURE — 97166 OT EVAL MOD COMPLEX 45 MIN: CPT

## 2019-04-10 PROCEDURE — 71045 X-RAY EXAM CHEST 1 VIEW: CPT

## 2019-04-10 PROCEDURE — 80048 BASIC METABOLIC PNL TOTAL CA: CPT

## 2019-04-10 PROCEDURE — 72192 CT PELVIS W/O DYE: CPT

## 2019-04-10 PROCEDURE — 87040 BLOOD CULTURE FOR BACTERIA: CPT

## 2019-04-10 PROCEDURE — 87521 HEPATITIS C PROBE&RVRS TRNSC: CPT

## 2019-04-10 PROCEDURE — 83605 ASSAY OF LACTIC ACID: CPT

## 2019-04-10 PROCEDURE — 36000 PLACE NEEDLE IN VEIN: CPT

## 2019-04-10 PROCEDURE — 84443 ASSAY THYROID STIM HORMONE: CPT

## 2019-04-10 PROCEDURE — 86803 HEPATITIS C AB TEST: CPT

## 2019-04-10 PROCEDURE — 73590 X-RAY EXAM OF LOWER LEG: CPT

## 2019-04-10 PROCEDURE — 85730 THROMBOPLASTIN TIME PARTIAL: CPT

## 2019-04-10 PROCEDURE — 85027 COMPLETE CBC AUTOMATED: CPT

## 2019-04-10 PROCEDURE — 97161 PT EVAL LOW COMPLEX 20 MIN: CPT

## 2019-04-10 PROCEDURE — 80053 COMPREHEN METABOLIC PANEL: CPT

## 2019-04-10 PROCEDURE — 36415 COLL VENOUS BLD VENIPUNCTURE: CPT

## 2019-04-10 PROCEDURE — 93005 ELECTROCARDIOGRAM TRACING: CPT

## 2019-04-10 PROCEDURE — 85610 PROTHROMBIN TIME: CPT

## 2019-04-10 PROCEDURE — 97535 SELF CARE MNGMENT TRAINING: CPT

## 2019-04-10 PROCEDURE — 76376 3D RENDER W/INTRP POSTPROCES: CPT

## 2019-04-10 RX ADMIN — Medication 150 MICROGRAM(S): at 06:14

## 2019-04-10 RX ADMIN — AMLODIPINE BESYLATE 2.5 MILLIGRAM(S): 2.5 TABLET ORAL at 06:14

## 2019-04-10 RX ADMIN — Medication 3 MILLILITER(S): at 04:50

## 2019-04-10 RX ADMIN — MORPHINE SULFATE 60 MILLIGRAM(S): 50 CAPSULE, EXTENDED RELEASE ORAL at 06:14

## 2019-04-10 RX ADMIN — Medication 1 PATCH: at 11:03

## 2019-04-10 RX ADMIN — Medication 1 PATCH: at 11:01

## 2019-04-10 RX ADMIN — Medication 1 MILLIGRAM(S): at 06:14

## 2019-04-10 RX ADMIN — Medication 500 MILLIGRAM(S): at 11:01

## 2019-04-10 RX ADMIN — Medication 40 MILLIGRAM(S): at 06:14

## 2019-04-10 RX ADMIN — Medication 81 MILLIGRAM(S): at 11:01

## 2019-04-10 RX ADMIN — Medication 3 MILLILITER(S): at 09:51

## 2019-04-10 RX ADMIN — Medication 1 PATCH: at 10:26

## 2019-04-10 RX ADMIN — ENOXAPARIN SODIUM 40 MILLIGRAM(S): 100 INJECTION SUBCUTANEOUS at 11:01

## 2019-04-10 RX ADMIN — MORPHINE SULFATE 60 MILLIGRAM(S): 50 CAPSULE, EXTENDED RELEASE ORAL at 07:00

## 2019-04-10 RX ADMIN — MEMANTINE HYDROCHLORIDE 10 MILLIGRAM(S): 10 TABLET ORAL at 06:14

## 2019-04-10 RX ADMIN — Medication 1 TABLET(S): at 11:01

## 2019-04-10 NOTE — DISCHARGE NOTE NURSING/CASE MANAGEMENT/SOCIAL WORK - NSDCPEWEB_GEN_ALL_CORE
[facility] for Tobacco Control website --- [email or web address] website --- [email or web address]

## 2019-04-10 NOTE — PROGRESS NOTE ADULT - ATTENDING COMMENTS
I have personally seen and examined patient on the above date.  I discussed the case with Billie RIBERA and I agree with findings and plan as detailed per note above, which I have amended where appropriate.      Patient not interested in [facility]. She wants to go home with home PT. She has to take care of her dog.     #Severe protein calorie malnutrition: Ensure bid  #Underweight: supplements ordered    Stable for d/c today. Time spent on d/c 34 min. I have personally seen and examined patient on the above date.  I discussed the case with Billie RIBERA and I agree with findings and plan as detailed per note above, which I have amended where appropriate.      Patient not interested in [facility]. She wants to go home with home PT. She has to take care of her dog.     #Severe protein calorie malnutrition: Ensure bid  #Underweight: supplements ordered  #Positive hepatitis C screening: Dr. Fitzgerald (PMD) made aware, as well as the patient. She will follow-up with ID (provider information given ) for further evaluation and potential treatment options if warranted.     Stable for d/c today. Time spent on d/c 34 min.

## 2019-04-10 NOTE — PHYSICAL THERAPY INITIAL EVALUATION ADULT - ADDITIONAL COMMENTS
Pt lives alone in an apartment, 3 JARED building with 1 rail, elevator inside to apartment.  Pt was independent with ambulation and ADLs at home, doesn't have any equipment.

## 2019-04-10 NOTE — DISCHARGE NOTE NURSING/CASE MANAGEMENT/SOCIAL WORK - NSDCPEEMAIL_GEN_ALL_CORE
[facility] for Tobacco Control email [email or web address]

## 2019-04-10 NOTE — DIETITIAN INITIAL EVALUATION ADULT. - OTHER INFO
69yr Old Female - Dx: Closed fracture of neck of left femur- Initial Assessment -Regular Diet with thin liquids, Educated pt on need for increased PO intake and supplementation, Denies Food Allergy, Tolerates Diet, No Chewing/Swallowing Complications (Per Pt), No Pressure Ulcers, No Edema, No Recent N/V/D/C

## 2019-04-10 NOTE — DISCHARGE NOTE PROVIDER - NSDCFUADDINST_GEN_ALL_CORE_FT
1.  f/u with Orthopedics, Dr. Napoles in 7-10 days, call office for appt.  2.  No smoking  3. Home care services; use walker for ambulation  4. MRI of right lower ext recommended to evaluate chronic wound, discuss with Dr. Fitzgerald to set up appt. 1.  f/u with Orthopedics, Dr. Napoles in 7-10 days, call office for appt.  2.  No smoking  3. Home care services; use walker for ambulation  4. MRI of right lower ext recommended to evaluate chronic wound, discuss with Dr. Fitzgerald to set up appt.  5. Hep C AB tested +, f/u with Dr. Fitzgerald for further testing.  Call Infectious Disease Physician for f/u, make an appt. with Dr. Camacho.

## 2019-04-10 NOTE — DISCHARGE NOTE PROVIDER - PROVIDER TOKENS
PROVIDER:[TOKEN:[[id number]]],PROVIDER:[TOKEN:[[id number]]] PROVIDER:[TOKEN:[[id number]]],PROVIDER:[TOKEN:[[id number]]],PROVIDER:[TOKEN:[[id number]]]

## 2019-04-10 NOTE — PROGRESS NOTE ADULT - SUBJECTIVE AND OBJECTIVE BOX
Patient is a 69y old  Female who presents with a chief complaint of Fall, back pain (10 Apr 2019 08:51)  Patient seen and examined at bedside.  Pt. c/o pelvic pain, somewhat tearful about wanting to go home to dog.    ALLERGIES:  Talwin (Unknown)    MEDICATIONS  (STANDING):  ALBUTerol/ipratropium for Nebulization 3 milliLiter(s) Nebulizer every 6 hours  ALPRAZolam 1 milliGRAM(s) Oral three times a day  amLODIPine   Tablet 2.5 milliGRAM(s) Oral daily  ascorbic acid 500 milliGRAM(s) Oral daily  aspirin  chewable 81 milliGRAM(s) Oral daily  enoxaparin Injectable 40 milliGRAM(s) SubCutaneous daily  influenza   Vaccine 0.5 milliLiter(s) IntraMuscular once  lactobacillus acidophilus 1 Tablet(s) Oral daily  levothyroxine 150 MICROGram(s) Oral daily  memantine 10 milliGRAM(s) Oral two times a day  methylPREDNISolone sodium succinate Injectable 40 milliGRAM(s) IV Push every 12 hours  morphine ER Tablet 60 milliGRAM(s) Oral two times a day  nicotine - 21 mG/24Hr(s) Patch 1 patch Transdermal daily    MEDICATIONS  (PRN):  loperamide 2 milliGRAM(s) Oral three times a day PRN Diarrhea  oxyCODONE    5 mG/acetaminophen 325 mG 1 Tablet(s) Oral every 4 hours PRN Severe Pain (7 - 10)  traMADol 50 milliGRAM(s) Oral every 8 hours PRN Moderate Pain (4 - 6)    Vital Signs Last 24 Hrs  T(F): 97.2 (10 Apr 2019 05:08), Max: 98 (09 Apr 2019 20:56)  HR: 56 (10 Apr 2019 05:08) (56 - 92)  BP: 107/73 (10 Apr 2019 05:08) (102/66 - 148/73)  RR: 15 (10 Apr 2019 05:08) (15 - 16)  SpO2: 100% (10 Apr 2019 05:08) (96% - 100%)  I&O's Summary    09 Apr 2019 07:01  -  10 Apr 2019 07:00  --------------------------------------------------------  IN: 900 mL / OUT: 0 mL / NET: 900 mL      PHYSICAL EXAM:  General: NAD, A/O x 3, very thin.  ENT: MMM  Neck: Supple, No JVD  Lungs: Clear to auscultation bilaterally  Cardio: RRR, S1/S2, No murmurs  Abdomen: Soft, Nontender, Nondistended; Bowel sounds present  Extremities: right lower ext with chronic wound at mid ant. aspect of shin, with eschar, slightly excoriated, discoloration of skin, no edema, no pus  Neuro:  no focal deficits, speech fluent    LABS:                        10.4   1.9   )-----------( 141      ( 10 Apr 2019 06:57 )             31.3     04-10    140  |  107  |  24  ----------------------------<  107  3.7   |  24  |  0.79    Ca    9.2      10 Apr 2019 06:57    TPro  9.1  /  Alb  3.8  /  TBili  1.1  /  DBili  x   /  AST  45  /  ALT  41  /  AlkPhos  85  04-08    eGFR if Non African American: 78 mL/min/1.73M2 (04-10-19 @ 06:57)  eGFR if African American: 90 mL/min/1.73M2 (04-10-19 @ 06:57)    PT/INR - ( 08 Apr 2019 16:00 )   PT: 13.3 sec;   INR: 1.18 ratio         PTT - ( 08 Apr 2019 16:00 )  PTT:33.9 sec  Lactate, Blood: 1.3 mmol/L (04-08 @ 16:00)          TSH 0.85   TSH with FT4 reflex --  Total T3 --        CAPILLARY BLOOD GLUCOSE              Culture - Blood (collected 08 Apr 2019 16:00)  Source: .Blood Blood-Peripheral  Preliminary Report (09 Apr 2019 23:01):    No growth to date.    Culture - Blood (collected 08 Apr 2019 16:00)  Source: .Blood Blood-Peripheral  Preliminary Report (09 Apr 2019 23:01):    No growth to date.        RADIOLOGY & ADDITIONAL TESTS:    Care Discussed with Consultants/Other Providers:

## 2019-04-10 NOTE — PROGRESS NOTE ADULT - ASSESSMENT
69 female with chronic back pain 2/2 OA, hypothyroid, anxiety presents s/p recent fall admitted with acute left pubic fracture.

## 2019-04-10 NOTE — PROGRESS NOTE ADULT - PROBLEM SELECTOR PLAN 5
Plastics consult; note appreciated, recommends further imaging (MRI) to r/o osteo, will obtain XR of tib/fib today, and pt. can f/u as outpatient for MRI  -no signs of acute infection, continue wound care chronic; continue levothyroxine

## 2019-04-10 NOTE — DIETITIAN INITIAL EVALUATION ADULT. - PHYSICAL APPEARANCE
BMI: 16.6, Muscle wasting and fat loss observed during physical exam (Orbital, temporal, clavicle, triceps, bicep)/emaciated

## 2019-04-10 NOTE — CONSULT NOTE ADULT - SUBJECTIVE AND OBJECTIVE BOX
CC:  Patient is a 69y old  Female who presents with a chief complaint of Fall, back pain (09 Apr 2019 14:15)  Patient known to me in out patient setting for wound of right leg    HPI:  68 y/o F with PMH hypothyroid, chronic back pain following with pain management presents with acute on chronic pain "all over". Pt states that she fell while she was walking her dog about 5 days ago and hurt her backside, but didn't do anything about it. The pain gradually got worse so she came in to the ED. Pt states she tripped and feel, she denies any dizziness, chest pain, palpatations, etc. She saw her PCP Dr. Fitzgerald last week for medication refills, and her pain management doctor for a refill of her morphine for which she takes for over 20 years for osteoporosis  osteoarthritis (08 Apr 2019 18:11)      PAST MEDICAL & SURGICAL HISTORY:  Alzheimer disease  Chronic back pain  Hypothyroid  History of arthroplasty of left hip      Allergies    Talwin (Unknown)    Intolerances        SOCIAL HISTORY          Smoking: Yes [ ]  No [ ]   ______pk yrs          ETOH  Yes [ ]  No [ ]  Social [ ]          DRUGS:  Yes [ ]  No [ ]  if so what______________    FAMILY HISTORY:  No pertinent family history in first degree relatives      MEDICATIONS  (STANDING):  ALBUTerol/ipratropium for Nebulization 3 milliLiter(s) Nebulizer every 6 hours  ALPRAZolam 1 milliGRAM(s) Oral three times a day  amLODIPine   Tablet 2.5 milliGRAM(s) Oral daily  ascorbic acid 500 milliGRAM(s) Oral daily  aspirin  chewable 81 milliGRAM(s) Oral daily  enoxaparin Injectable 40 milliGRAM(s) SubCutaneous daily  influenza   Vaccine 0.5 milliLiter(s) IntraMuscular once  lactobacillus acidophilus 1 Tablet(s) Oral daily  levothyroxine 150 MICROGram(s) Oral daily  memantine 10 milliGRAM(s) Oral two times a day  methylPREDNISolone sodium succinate Injectable 40 milliGRAM(s) IV Push every 12 hours  morphine ER Tablet 60 milliGRAM(s) Oral two times a day  nicotine - 21 mG/24Hr(s) Patch 1 patch Transdermal daily    MEDICATIONS  (PRN):  loperamide 2 milliGRAM(s) Oral three times a day PRN Diarrhea  oxyCODONE    5 mG/acetaminophen 325 mG 1 Tablet(s) Oral every 4 hours PRN Severe Pain (7 - 10)  traMADol 50 milliGRAM(s) Oral every 8 hours PRN Moderate Pain (4 - 6)         Review of systems:  General:  no fever or chills  Pulmonary:  no SOB  Cardiac:  denies chest pain, palpitations  GI:  no nausea, vomitting, or abddominal pain  :  no increase frequency, or burning  Heme:  no easy bruising with minor trauma  Musculoskeletal:    history of  trauma  Skin:  no history of rashes, injury, trauma  Neuro:  no weakness         Vital Signs Last 24 Hrs  T(C): 36.2 (10 Apr 2019 05:08), Max: 36.7 (09 Apr 2019 20:56)  T(F): 97.2 (10 Apr 2019 05:08), Max: 98 (09 Apr 2019 20:56)  HR: 56 (10 Apr 2019 05:08) (56 - 92)  BP: 107/73 (10 Apr 2019 05:08) (102/66 - 148/73)  BP(mean): --  RR: 15 (10 Apr 2019 05:08) (15 - 16)  SpO2: 100% (10 Apr 2019 05:08) (96% - 100%)    Physical Exam:    General:   no distress  Extremities:  right leg eschar 3 cm x 1.5 cm x .1 cm     periwound  hyperpigmentation  LABS:                        10.4   1.9   )-----------( 141      ( 10 Apr 2019 06:57 )             31.3     04-10    140  |  107  |  24<H>  ----------------------------<  107<H>  3.7   |  24  |  0.79    Ca    9.2      10 Apr 2019 06:57    TPro  9.1<H>  /  Alb  3.8  /  TBili  1.1  /  DBili  x   /  AST  45<H>  /  ALT  41  /  AlkPhos  85  04-08    PT/INR - ( 08 Apr 2019 16:00 )   PT: 13.3 sec;   INR: 1.18 ratio         PTT - ( 08 Apr 2019 16:00 )  PTT:33.9 sec      RADIOLOGY & ADDITIONAL STUDIES:    Risks, benefits, and alternatives to treatment discussed. All questions answered with understanding.    Procedure Performed:  (  )Yes     (  )No  Name of Procedure:      [  ]Debridement     [  ]I&D    [  ]Laceration Repair     [  ]Other:  (  )partial thickness     (  )full thickness     (  )subcutaneous     (  )muscle/tendon     (  )bone  (  )sharp     (  )surgical

## 2019-04-10 NOTE — CHART NOTE - NSCHARTNOTEFT_GEN_A_CORE
Upon Nutritional Assessment by the Registered Dietitian your patient was determined to meet criteria / has evidence of the following diagnosis/diagnoses:          [ ]  Mild Protein Calorie Malnutrition        [ ]  Moderate Protein Calorie Malnutrition        [X ] Severe Protein Calorie Malnutrition        [ ] Unspecified Protein Calorie Malnutrition        [X ] Underweight / BMI <19        [ ] Morbid Obesity / BMI > 40      Findings as based on:  [X ] Comprehensive nutrition assessment   [X ] Nutrition Focused Physical Exam  [X ] Other: poor po intake w/ weight loss, muscle wasting, loss body fat, temporal wasting      Nutrition Plan/Recommendations:      Suggest ensure enlive BID    PROVIDER Section:     By signing this assessment you are acknowledging and agree with the diagnosis/diagnoses assigned by the Registered Dietitian    Comments:

## 2019-04-10 NOTE — CONSULT NOTE ADULT - ASSESSMENT
chronic open wound of right leg, xray from 11/18 negative  -suggest imaging to evaluate for osteo, discussed with Dr. Campa  -local wound care ([facility]), discussed with nurse Patel

## 2019-04-10 NOTE — CHART NOTE - NSCHARTNOTEFT_GEN_A_CORE
called by nurse for abnormal test result  Hep C Ab is Reactive  I called lab, Hep C RNA, qualitative is being run/results pending

## 2019-04-10 NOTE — PROGRESS NOTE ADULT - PROBLEM SELECTOR PLAN 4
chronic continue pain management prn BMI - 16.8, pt. seen by Nutrition had counseling, Ensure added to diet

## 2019-04-10 NOTE — DISCHARGE NOTE PROVIDER - NSDCCPCAREPLAN_GEN_ALL_CORE_FT
PRINCIPAL DISCHARGE DIAGNOSIS  Diagnosis: Other fracture of pubis  Assessment and Plan of Treatment:

## 2019-04-10 NOTE — DISCHARGE NOTE NURSING/CASE MANAGEMENT/SOCIAL WORK - NSDCDPATPORTLINK_GEN_ALL_CORE
You can access the [email or web address] Patient Portal, offered by [facility], by registering with the following website: [email or web address]

## 2019-04-10 NOTE — PROGRESS NOTE ADULT - PROBLEM SELECTOR PLAN 3
chronic; continue levothyroxine wbc - 1.9 today; possibly idiopathic, pt with no signs of infection, followed by Dr. Gloria as outpatient, plan for outpatient follow up

## 2019-04-10 NOTE — DISCHARGE NOTE PROVIDER - CARE PROVIDER_API CALL
Lloyd Fitzgerald (DO)  [address], [address]  [place] [address]  Phone: [phone]  Fax: [phone]  Follow Up Time:     Anthony Napoles)  Orthopaedic Surgery  [address] [facility], [facility] [address]  [place] [address]  Phone: [phone]  Fax: [phone]  Follow Up Time: Lloyd Fitzgerald (DO)  Medicine  [address] [facility], [address]  [place] [address]  Phone: [phone]  Fax: [phone]  Follow Up Time:     Anthony Napoles)  Orthopaedic Surgery  [address] [facility] [facility] [address]  [place] [address]  Phone: [phone]  Fax: [phone]  Follow Up Time:     Christophe Camacho)  Infectious Disease  [address] [facility], [facility] [address]  [place] [address]  Phone: [phone]  Fax: [phone]  Follow Up Time:

## 2019-04-10 NOTE — DIETITIAN INITIAL EVALUATION ADULT. - ORAL INTAKE PTA
poor/Pt reports decreased PO intake PTA. Pt reports eating 1 lean cuisine meal per day and hasn't felt like cooking since her  passed away a few years ago. Pt denies taking supplements at home but reports recently purchasing high calorie protein powder 2/2 poor appetite and weight loss

## 2019-04-10 NOTE — PROGRESS NOTE ADULT - PROBLEM SELECTOR PLAN 1
-acute fracture of left pubis, ortho consulted; note appreciated, it is non-surgical, continue with pain management  -await PT eval today; evaluate for safety to go home; pt. lives alone, vs. [facility]  pt/ot, pmr consult, pain management -acute fracture of left pubis, ortho consulted; note appreciated, it is non-surgical, continue with pain management  -await PT eval today; evaluate for safety to go home; pt. lives alone, vs. [facility]

## 2019-04-10 NOTE — DISCHARGE NOTE PROVIDER - NSDCDCMDCOMP_GEN_ALL_CORE
MRI report given. Leaning towards BCT but will talk with her  and call us   This document is complete and the patient is ready for discharge.

## 2019-04-13 ENCOUNTER — MOBILE ON CALL (OUTPATIENT)
Age: 69
End: 2019-04-13

## 2019-04-18 NOTE — DISCHARGE NOTE PROVIDER - REASON FOR ADMISSION
Left VM---ok'd per Snapshot, informing pt's Mother, that strep culture results came back negative.     Fall, back pain

## 2019-05-12 ENCOUNTER — EMERGENCY (EMERGENCY)
Facility: HOSPITAL | Age: 69
LOS: 1 days | Discharge: HOME | End: 2019-05-12
Attending: EMERGENCY MEDICINE | Admitting: EMERGENCY MEDICINE
Payer: MEDICARE

## 2019-05-12 VITALS
HEIGHT: 63 IN | OXYGEN SATURATION: 99 % | WEIGHT: 108.91 LBS | TEMPERATURE: 98 F | RESPIRATION RATE: 18 BRPM | SYSTOLIC BLOOD PRESSURE: 129 MMHG | HEART RATE: 78 BPM | DIASTOLIC BLOOD PRESSURE: 82 MMHG

## 2019-05-12 VITALS — TEMPERATURE: 99 F

## 2019-05-12 DIAGNOSIS — Z98.890 OTHER SPECIFIED POSTPROCEDURAL STATES: Chronic | ICD-10-CM

## 2019-05-12 PROCEDURE — 99283 EMERGENCY DEPT VISIT LOW MDM: CPT

## 2019-05-12 PROCEDURE — 99283 EMERGENCY DEPT VISIT LOW MDM: CPT | Mod: XU

## 2019-05-12 RX ORDER — MORPHINE SULFATE 50 MG/1
1 CAPSULE, EXTENDED RELEASE ORAL
Qty: 8 | Refills: 0
Start: 2019-05-12 | End: 2019-05-15

## 2019-05-12 NOTE — ED PROVIDER NOTE - NSFOLLOWUPINSTRUCTIONS_ED_ALL_ED_FT
Follow up with pmd within 48 hours.   Follow up with your pain management doctor.   Take your morphine as prescribed for chronic pain.   Follow up with wound care.   Return to the ED if any worsening or persistent symptoms.     Chronic Pain    WHAT YOU NEED TO KNOW:    Chronic pain is pain that does not get better for 3 months or longer. Chronic pain may hurt all the time, or come and go.     DISCHARGE INSTRUCTIONS:    Call 911 or have someone call 911 for any of the following:     You are breathing slower than normal, or you have trouble breathing.      You cannot be awakened.      You have a seizure.    Seek care immediately if:     Your heart is beating slower than normal.      Your heart feels like it is jumping or fluttering.       You cannot think clearly.    Contact your healthcare provider if:     You have side effects from prescription pain medicine, such as itching, nausea, or vomiting.      You have trouble sleeping.      Your pain gets worse, even after you take medicine.      You don't think the medicine is working.      You have questions or concerns about your condition or care.    Medicines: You may need any of the following:    Acetaminophen decreases pain and fever. It is available without a doctor's order. Ask how much to take and how often to take it. Follow directions. Read the labels of all other medicines you are using to see if they also contain acetaminophen, or ask your doctor or pharmacist. Acetaminophen can cause liver damage if not taken correctly. Do not use more than 4 grams (4,000 milligrams) total of acetaminophen in one day.       NSAIDs, such as ibuprofen, help decrease swelling, pain, and fever. This medicine is available with or without a doctor's order. NSAIDs can cause stomach bleeding or kidney problems in certain people. If you take blood thinner medicine, always ask your healthcare provider if NSAIDs are safe for you. Always read the medicine label and follow directions.      Prescription pain medicine called narcotics or opioids may be given. Ask your healthcare provider how to take this medicine safely.       Anesthetics can be rubbed on your skin or injected into a nerve or muscle to numb an area.      Other medicines may reduce pain, anxiety, muscle tension, or swelling.      Take your medicine as directed. Contact your healthcare provider if you think your medicine is not helping or if you have side effects. Tell him of her if you are allergic to any medicine. Keep a list of the medicines, vitamins, and herbs you take. Include the amounts, and when and why you take them. Bring the list or the pill bottles to follow-up visits. Carry your medicine list with you in case of an emergency.    Manage your chronic pain:     Apply heat on the area in pain for 20 to 30 minutes every 2 hours for as many days as directed. Heat helps decrease pain and muscle spasms.      Apply ice on the part of your body that hurts for 15 to 20 minutes every hour or as directed. Use an ice pack, or put crushed ice in a plastic bag. Cover it with a towel. Ice decreases pain and swelling, and helps prevent tissue damage.      Go to physical therapy as directed. A physical therapist teaches you exercises to help improve movement and strength, and to decrease pain.      Exercise for 30 minutes, 3 times a week. Regular physical activity can help decrease pain and improve your quality of life. Ask your healthcare provider about the best exercise plan for your type of pain.      Get enough sleep. Create a relaxing bedtime routine. Go to sleep and wake up at the same time every day. Avoid caffeine in the afternoon.       Talk with a counselor or therapist. A type of counseling called cognitive behavioral therapy (CBT) can help your chronic pain by changing the way you think about it. CBT can also improve your mood, sleep, and ability to move.    What you must know if you take narcotic pain medicine:     You may need to take a bowel movement softener. The most common side effect of prescription pain medicine is constipation. Bowel movement softeners are available over the counter.      Do not mix prescription pain medicines. This can cause an overdose of medicine, which can become life-threatening. Read labels. Make sure you know the ingredients in all of your medicines.      Do not drink alcohol when you take prescription pain medicine. It is not safe to mix narcotics or opioids with alcohol or illegal drugs.      Prescription pain medicine may impair your ability to drive or work safely. They may also cause dizziness and increase your risk for falling.       Store prescription pain medicine in a safe location at home. Keep your medicine away from children and other people. Never share your medicine with anyone.     Follow up with your healthcare provider as directed: You may be referred to a pain specialist. Write down your questions so you remember to ask them during your visits.        © Copyright [employer] 2019 All illustrations and images included in CareNotes are the copyrighted property of [employer] or [employer].      back to top                      © Copyright [employer] 2019

## 2019-05-12 NOTE — ED ADULT NURSE NOTE - OBJECTIVE STATEMENT
I have a wound on my leg (right) that I wanted checked.  I had to sign out AMA a couple of weeks ago because of my dog.

## 2019-05-12 NOTE — ED PROVIDER NOTE - PHYSICAL EXAMINATION
pt ambulating with walker at baseline   right leg- chronic open wound, healing well, no drainage, no warmth, no surrounding redness or signs of acute infection

## 2019-05-12 NOTE — ED PROVIDER NOTE - OBJECTIVE STATEMENT
68 y/o F with PMH hypothyroid, chronic back pain following with pain management presents with acute on chronic pain all over. Pt reports she ran out of her pain mediations- which she sees pain management MD and Dr. Fitzgerald, which she gets morphine 60mg bid for the last 20 years for osteoporosis/ osteoarthritis. Pt  ran out of medications, not able to get refill today. Pt also presents for left lower right chronic wound check. Denies any fever, chills, chest pain, sob or any other symptoms. Pt uses walker at baseline due to old pelvic fx. 68 y/o F with PMH hypothyroid, chronic back pain following with pain management presents with acute on chronic pain all over. Pt reports she ran out of her pain mediations- which she sees pain management MD and Dr. Fitzgerald, which she gets morphine 60mg bid for the last 20 years for osteoporosis/ osteoarthritis. Pt  ran out of medications, not able to get refill today. Pt also presents for right lower right chronic wound check. Denies any fever, chills, chest pain, sob or any other symptoms. Pt uses walker at baseline due to old pelvic fx.

## 2019-05-12 NOTE — ED PROVIDER NOTE - CLINICAL SUMMARY MEDICAL DECISION MAKING FREE TEXT BOX
68 y/o F with PMH hypothyroid, chronic back pain following with pain management presents with acute on chronic pain all over. Pt reports she ran out of her pain mediations- which she sees pain management MD and Dr. Fitzgerald, which she gets morphine 60mg bid for the last 20 years for osteoporosis/ osteoarthritis. Pt  ran out of medications, not able to get refill today. Pt also presents for left lower right chronic wound check. Denies any fever, chills, chest pain, sob or any other symptoms. Pt uses walker at baseline due to old pelvic fx. chroinic wound- no signs of acute infection, will give dose of chronic pain meds, and pt to fu with pain management and pmd

## 2019-05-12 NOTE — ED PROVIDER NOTE - CARE PROVIDER_API CALL
Amara Evans)  Plastic Surgery  [address] [place] [address], [address]  [place] [address]  Phone: [phone]  Fax: [phone]  Follow Up Time:

## 2019-05-12 NOTE — ED PROVIDER NOTE - ATTENDING CONTRIBUTION TO CARE
I personally evaluated the patient. I reviewed the Resident’s or Physician Assistant’s note (as assigned above), and agree with the findings and plan except as documented in my note.     Patient ran out of pain medication    PE: old ulcer tibia. No active infection    explained need to follow up with pain management

## 2019-05-24 PROBLEM — T14.90XA INJURY, UNSPECIFIED, INITIAL ENCOUNTER: Chronic | Status: ACTIVE | Noted: 2019-05-12

## 2019-05-30 ENCOUNTER — EMERGENCY (EMERGENCY)
Facility: HOSPITAL | Age: 69
LOS: 1 days | Discharge: HOME | End: 2019-05-30
Attending: EMERGENCY MEDICINE | Admitting: EMERGENCY MEDICINE
Payer: MEDICARE

## 2019-05-30 VITALS
RESPIRATION RATE: 16 BRPM | OXYGEN SATURATION: 98 % | DIASTOLIC BLOOD PRESSURE: 86 MMHG | SYSTOLIC BLOOD PRESSURE: 137 MMHG | TEMPERATURE: 98 F | HEART RATE: 82 BPM

## 2019-05-30 VITALS
OXYGEN SATURATION: 98 % | TEMPERATURE: 98 F | WEIGHT: 100.09 LBS | HEIGHT: 64 IN | SYSTOLIC BLOOD PRESSURE: 135 MMHG | RESPIRATION RATE: 17 BRPM | HEART RATE: 83 BPM | DIASTOLIC BLOOD PRESSURE: 84 MMHG

## 2019-05-30 DIAGNOSIS — Z98.890 OTHER SPECIFIED POSTPROCEDURAL STATES: Chronic | ICD-10-CM

## 2019-05-30 PROCEDURE — 99283 EMERGENCY DEPT VISIT LOW MDM: CPT

## 2019-05-30 PROCEDURE — 73590 X-RAY EXAM OF LOWER LEG: CPT

## 2019-05-30 PROCEDURE — 73590 X-RAY EXAM OF LOWER LEG: CPT | Mod: 26,RT

## 2019-05-30 RX ORDER — DIPHENHYDRAMINE HCL 50 MG
25 CAPSULE ORAL ONCE
Refills: 0 | Status: COMPLETED | OUTPATIENT
Start: 2019-05-30 | End: 2019-05-30

## 2019-05-30 RX ORDER — FAMOTIDINE 10 MG/ML
20 INJECTION INTRAVENOUS ONCE
Refills: 0 | Status: COMPLETED | OUTPATIENT
Start: 2019-05-30 | End: 2019-05-30

## 2019-05-30 RX ADMIN — Medication 60 MILLIGRAM(S): at 12:18

## 2019-05-30 RX ADMIN — Medication 25 MILLIGRAM(S): at 12:19

## 2019-05-30 RX ADMIN — FAMOTIDINE 20 MILLIGRAM(S): 10 INJECTION INTRAVENOUS at 12:18

## 2019-05-30 RX ADMIN — Medication 300 MILLIGRAM(S): at 12:18

## 2019-05-30 NOTE — ED PROVIDER NOTE - OBJECTIVE STATEMENT
70 y/o F PMH Alzheimer, chronic back pain, hypothyroid, chronic wound, sees wound care (Nathan) c/o worsening pain and redness around wound x 1 1/2 months. Pt was referred to have outpt mri to r/o osteo which has not been done yet. Denies fever, drainage from wound, CP, SOB, abdominal pain, n/v. 70 y/o F PMH Alzheimer, chronic back pain, hypothyroid, chronic wound, sees wound care (Nathan) c/o worsening pain and redness around wound x 1 1/2 months. Pt was referred to have outpt mri to r/o osteo which has not been done yet. Denies fever, drainage from wound, CP, SOB, abdominal pain, n/v.    Dr. Estrada: 69F h/o Alzheimers, chronic back pain, chronic RLE wound not on abx currently, follows with Dr. Evans, p/w worsening pain and redness around wound x 1.5 mos, no drainage, no fevers or chills. Also c/o itching all over body x 2-3 days and is concerned for bed bugs. Pt lives in [facility].

## 2019-05-30 NOTE — PROVIDER CONTACT NOTE (OTHER) - ASSESSMENT
I am told that the exterminators were in pt.s apt today as soon as she left and treated the area.  I am told that it is safe for the patient to re-enter the premises.  There have been some difficulty with cooperation with extermination efforts.

## 2019-05-30 NOTE — ED PROVIDER NOTE - MUSCULOSKELETAL MINIMAL EXAM
2cm x 2cm ulcer with purulence on right distal tib fib with surrounding area of erythema extending 3 cm, no warmth. Per pt this is baseline.

## 2019-05-30 NOTE — ED ADULT NURSE NOTE - OBJECTIVE STATEMENT
Pt c/o right lower leg wound x9 months s/p fall. Pt presents with dressing to wound that she reports she changed this morning and was seen for wound 2 weeks ago. Pt states that she rents an apartment at the [facility] and that her apartment is invested with bed bugs for months. Pt reports itchy bites and presents with hundreds of small red dots on hands, back, feet, and legs. Pt denies fevers. Pt is ambulatory. Pt presents with wound to right lower lateral leg wound with surrounding redness.

## 2019-05-30 NOTE — ED PROVIDER NOTE - CARE PLAN
Principal Discharge DX:	Cellulitis of right lower extremity  Secondary Diagnosis:	Bedbug bite, initial encounter

## 2019-05-30 NOTE — ED PROVIDER NOTE - CLINICAL SUMMARY MEDICAL DECISION MAKING FREE TEXT BOX
Pt p/w cellulitis RLE, afebrile rectally, given tenderness will get xray. Pt to be started on clinda po here and to get outpt MRI of RLE.  Will tx pruritic rash with prednisone, pepcid and benadryl.  SW consult for placement

## 2019-05-30 NOTE — ED PROVIDER NOTE - ATTENDING CONTRIBUTION TO CARE
Dr. Estrada: I performed a face to face bedside interview with patient regarding history of present illness, review of symptoms and past medical history. I completed an independent physical exam.  I have discussed patient's plan of care with PA.   I agree with note as stated above, having amended the EMR as needed to reflect my findings.   This includes HISTORY OF PRESENT ILLNESS, HIV, PAST MEDICAL/SURGICAL/FAMILY/SOCIAL HISTORY, ALLERGIES AND HOME MEDICATIONS, REVIEW OF SYSTEMS, PHYSICAL EXAM, and any PROGRESS NOTES during the time I functioned as the attending physician for this patient.    Dr. Estrada: This H&P has been written by myself in its entirety

## 2019-05-30 NOTE — ED ADULT TRIAGE NOTE - CHIEF COMPLAINT QUOTE
I have a wound in my right leg that's not healing, it looks bad, my MD does not know what's going on

## 2019-05-30 NOTE — ED PROVIDER NOTE - NSFOLLOWUPINSTRUCTIONS_ED_ALL_ED_FT
Follow up with your PMD/wound care specialist within 48-72 hours.  Show copies of your labs provided.  Take Clindamycin 300mg every 6 hours x 7 days.  Take Prednisone 40mg daily for 4 days, Pepcid 20mg 2x/day for 4 days, Benadryl 25mg every 8 hours for 4 days- caution drowsiness/do not drive. Worsening or new shortness of breath, chest pain, fever, chills, pain, swelling return to the ER

## 2019-05-30 NOTE — PROVIDER CONTACT NOTE (OTHER) - SITUATION
Pt. can be discharged home.  [facility] administration contacted, they are aware of bedbug situation.

## 2019-05-30 NOTE — ED ADULT NURSE REASSESSMENT NOTE - NS ED NURSE REASSESS COMMENT FT1
Pt ready for discharge however she is concerned about bedbugs in her apartment at the [facility] and does not want to be discharged back to [facility]. Case Management Evelin Batista called to speak with patient.

## 2019-06-03 ENCOUNTER — APPOINTMENT (OUTPATIENT)
Dept: MRI IMAGING | Facility: CLINIC | Age: 69
End: 2019-06-03
Payer: MEDICARE

## 2019-06-03 ENCOUNTER — OUTPATIENT (OUTPATIENT)
Dept: OUTPATIENT SERVICES | Facility: HOSPITAL | Age: 69
LOS: 1 days | End: 2019-06-03
Payer: MEDICARE

## 2019-06-03 DIAGNOSIS — Z00.8 ENCOUNTER FOR OTHER GENERAL EXAMINATION: ICD-10-CM

## 2019-06-03 DIAGNOSIS — Z98.890 OTHER SPECIFIED POSTPROCEDURAL STATES: Chronic | ICD-10-CM

## 2019-06-03 PROCEDURE — A9585: CPT

## 2019-06-03 PROCEDURE — 73720 MRI LWR EXTREMITY W/O&W/DYE: CPT

## 2019-06-03 PROCEDURE — 73720 MRI LWR EXTREMITY W/O&W/DYE: CPT | Mod: 26,RT

## 2019-07-05 ENCOUNTER — APPOINTMENT (OUTPATIENT)
Age: 69
End: 2019-07-05

## 2019-07-30 ENCOUNTER — NON-APPOINTMENT (OUTPATIENT)
Age: 69
End: 2019-07-30

## 2019-07-30 ENCOUNTER — APPOINTMENT (OUTPATIENT)
Dept: FAMILY MEDICINE | Facility: CLINIC | Age: 69
End: 2019-07-30
Payer: MEDICARE

## 2019-07-30 VITALS
HEIGHT: 62 IN | RESPIRATION RATE: 16 BRPM | DIASTOLIC BLOOD PRESSURE: 105 MMHG | OXYGEN SATURATION: 98 % | BODY MASS INDEX: 18.58 KG/M2 | HEART RATE: 64 BPM | WEIGHT: 101 LBS | TEMPERATURE: 97.5 F | SYSTOLIC BLOOD PRESSURE: 165 MMHG

## 2019-07-30 VITALS — SYSTOLIC BLOOD PRESSURE: 166 MMHG | DIASTOLIC BLOOD PRESSURE: 94 MMHG

## 2019-07-30 DIAGNOSIS — F32.9 MAJOR DEPRESSIVE DISORDER, SINGLE EPISODE, UNSPECIFIED: ICD-10-CM

## 2019-07-30 DIAGNOSIS — E03.9 HYPOTHYROIDISM, UNSPECIFIED: ICD-10-CM

## 2019-07-30 DIAGNOSIS — I10 ESSENTIAL (PRIMARY) HYPERTENSION: ICD-10-CM

## 2019-07-30 PROCEDURE — 99204 OFFICE O/P NEW MOD 45 MIN: CPT | Mod: 25

## 2019-07-30 PROCEDURE — 93000 ELECTROCARDIOGRAM COMPLETE: CPT

## 2019-07-30 NOTE — PHYSICAL EXAM
[Normal Sclera/Conjunctiva] : normal sclera/conjunctiva [Ill-Appearing] : ill-appearing [No Acute Distress] : no acute distress [PERRL] : pupils equal round and reactive to light [Normal Oropharynx] : the oropharynx was normal [Thyroid Normal, No Nodules] : the thyroid was normal and there were no nodules present [No Lymphadenopathy] : no lymphadenopathy [No JVD] : no jugular venous distention [Clear to Auscultation] : lungs were clear to auscultation bilaterally [No Accessory Muscle Use] : no accessory muscle use [No Respiratory Distress] : no respiratory distress  [Normal Rate] : normal rate  [Regular Rhythm] : with a regular rhythm [No Palpable Aorta] : no palpable aorta [No Edema] : there was no peripheral edema [No Murmur] : no murmur heard [Non Tender] : non-tender [Soft] : abdomen soft [No HSM] : no HSM [No Masses] : no abdominal mass palpated [Normal Bowel Sounds] : normal bowel sounds [Normal Supraclavicular Nodes] : no supraclavicular lymphadenopathy [Normal Posterior Cervical Nodes] : no posterior cervical lymphadenopathy [Normal Anterior Cervical Nodes] : no anterior cervical lymphadenopathy [No CVA Tenderness] : no CVA  tenderness [No Spinal Tenderness] : no spinal tenderness [No Joint Swelling] : no joint swelling [Grossly Normal Strength/Tone] : grossly normal strength/tone [No Rash] : no rash [No Skin Lesions] : no skin lesions [No Focal Deficits] : no focal deficits [Coordination Grossly Intact] : coordination grossly intact [Normal Affect] : the affect was normal [Speech Grossly Normal] : speech grossly normal [de-identified] : thin body habitus

## 2019-07-30 NOTE — HISTORY OF PRESENT ILLNESS
[FreeTextEntry8] :  Here as new patient with dementia , hypothyroidism, hypertension,COPD and depression. Needs refills. Has a chronic wound issue under rx by wound care needs a nutritional consult

## 2019-07-30 NOTE — PLAN
[FreeTextEntry1] : pt with multiple issues will have lab and consult with eye and dietary. Continue with Psych, wound care and hematology. Follow up in 7-10 days. Meds refilled

## 2019-07-30 NOTE — REVIEW OF SYSTEMS
[Fever] : no fever [Chills] : no chills [Fatigue] : fatigue [Night Sweats] : no night sweats [Pain] : no pain [Sore Throat] : no sore throat [Nasal Discharge] : nasal discharge [Postnasal Drip] : postnasal drip [Negative] : Neurological

## 2019-08-01 ENCOUNTER — OUTPATIENT (OUTPATIENT)
Dept: OUTPATIENT SERVICES | Facility: HOSPITAL | Age: 69
LOS: 1 days | End: 2019-08-01
Payer: MEDICARE

## 2019-08-01 DIAGNOSIS — Z98.890 OTHER SPECIFIED POSTPROCEDURAL STATES: Chronic | ICD-10-CM

## 2019-08-01 DIAGNOSIS — Z00.00 ENCOUNTER FOR GENERAL ADULT MEDICAL EXAMINATION WITHOUT ABNORMAL FINDINGS: ICD-10-CM

## 2019-08-01 PROCEDURE — 93971 EXTREMITY STUDY: CPT

## 2019-08-01 PROCEDURE — 93971 EXTREMITY STUDY: CPT | Mod: 26,RT

## 2019-08-02 ENCOUNTER — OUTPATIENT (OUTPATIENT)
Dept: OUTPATIENT SERVICES | Facility: HOSPITAL | Age: 69
LOS: 1 days | End: 2019-08-02
Payer: MEDICARE

## 2019-08-02 ENCOUNTER — APPOINTMENT (OUTPATIENT)
Dept: RADIOLOGY | Facility: HOSPITAL | Age: 69
End: 2019-08-02
Payer: MEDICARE

## 2019-08-02 ENCOUNTER — LABORATORY RESULT (OUTPATIENT)
Age: 69
End: 2019-08-02

## 2019-08-02 DIAGNOSIS — F32.9 MAJOR DEPRESSIVE DISORDER, SINGLE EPISODE, UNSPECIFIED: ICD-10-CM

## 2019-08-02 DIAGNOSIS — Z98.890 OTHER SPECIFIED POSTPROCEDURAL STATES: Chronic | ICD-10-CM

## 2019-08-02 PROCEDURE — 71046 X-RAY EXAM CHEST 2 VIEWS: CPT | Mod: 26

## 2019-08-02 PROCEDURE — 71046 X-RAY EXAM CHEST 2 VIEWS: CPT

## 2019-08-06 LAB
25(OH)D3 SERPL-MCNC: 24.6 NG/ML
ALBUMIN SERPL ELPH-MCNC: 4.8 G/DL
ALP BLD-CCNC: 98 U/L
ALT SERPL-CCNC: 51 U/L
ANION GAP SERPL CALC-SCNC: 17 MMOL/L
APPEARANCE: CLEAR
AST SERPL-CCNC: 70 U/L
BASOPHILS # BLD AUTO: 0.03 K/UL
BASOPHILS NFR BLD AUTO: 0.8 %
BILIRUB SERPL-MCNC: 0.5 MG/DL
BILIRUBIN URINE: NEGATIVE
BLOOD URINE: NEGATIVE
BUN SERPL-MCNC: 21 MG/DL
CALCIUM SERPL-MCNC: 10.2 MG/DL
CHLORIDE SERPL-SCNC: 97 MMOL/L
CHOLEST SERPL-MCNC: 176 MG/DL
CHOLEST/HDLC SERPL: 1.9 RATIO
CO2 SERPL-SCNC: 27 MMOL/L
COLOR: NORMAL
CREAT SERPL-MCNC: 0.75 MG/DL
EOSINOPHIL # BLD AUTO: 0.2 K/UL
EOSINOPHIL NFR BLD AUTO: 5.6 %
ERYTHROCYTE [SEDIMENTATION RATE] IN BLOOD BY WESTERGREN METHOD: 53 MM/HR
ESTIMATED AVERAGE GLUCOSE: 97 MG/DL
FOLATE RBC-MCNC: 2330 NG/ML
GLUCOSE QUALITATIVE U: NEGATIVE
GLUCOSE SERPL-MCNC: 112 MG/DL
HBA1C MFR BLD HPLC: 5 %
HCT VFR BLD CALC: 49 %
HCT VFR BLD CALC: 50.8 %
HDLC SERPL-MCNC: 92 MG/DL
HGB BLD-MCNC: 16 G/DL
IMM GRANULOCYTES NFR BLD AUTO: 0.3 %
KETONES URINE: NEGATIVE
LDLC SERPL CALC-MCNC: 66 MG/DL
LEUKOCYTE ESTERASE URINE: ABNORMAL
LYMPHOCYTES # BLD AUTO: 1.5 K/UL
LYMPHOCYTES NFR BLD AUTO: 42.4 %
MAN DIFF?: NORMAL
MCHC RBC-ENTMCNC: 27.8 PG
MCHC RBC-ENTMCNC: 31.5 GM/DL
MCV RBC AUTO: 88.2 FL
MONOCYTES # BLD AUTO: 0.44 K/UL
MONOCYTES NFR BLD AUTO: 12.4 %
NEUTROPHILS # BLD AUTO: 1.36 K/UL
NEUTROPHILS NFR BLD AUTO: 38.5 %
NITRITE URINE: NEGATIVE
PH URINE: 8.5
PLATELET # BLD AUTO: 184 K/UL
POTASSIUM SERPL-SCNC: 5 MMOL/L
PREALB SERPL NEPH-MCNC: 22 MG/DL
PROT SERPL-MCNC: 8.6 G/DL
PROTEIN URINE: NORMAL
RBC # BLD: 5.76 M/UL
RBC # FLD: 15.8 %
SODIUM SERPL-SCNC: 141 MMOL/L
SPECIFIC GRAVITY URINE: 1.01
TRIGL SERPL-MCNC: 92 MG/DL
TSH SERPL-ACNC: 36.3 UIU/ML
UROBILINOGEN URINE: NORMAL
VIT B1 SERPL-MCNC: 243.8 NMOL/L
VIT B12 SERPL-MCNC: 1349 PG/ML
WBC # FLD AUTO: 3.54 K/UL

## 2019-08-13 ENCOUNTER — APPOINTMENT (OUTPATIENT)
Dept: FAMILY MEDICINE | Facility: CLINIC | Age: 69
End: 2019-08-13

## 2019-08-14 ENCOUNTER — EMERGENCY (EMERGENCY)
Facility: HOSPITAL | Age: 69
LOS: 1 days | Discharge: HOME | End: 2019-08-14
Attending: INTERNAL MEDICINE | Admitting: INTERNAL MEDICINE
Payer: MEDICARE

## 2019-08-14 VITALS
HEART RATE: 77 BPM | TEMPERATURE: 98 F | RESPIRATION RATE: 18 BRPM | OXYGEN SATURATION: 98 % | WEIGHT: 100.09 LBS | SYSTOLIC BLOOD PRESSURE: 138 MMHG | HEIGHT: 64 IN | DIASTOLIC BLOOD PRESSURE: 97 MMHG

## 2019-08-14 VITALS
OXYGEN SATURATION: 98 % | SYSTOLIC BLOOD PRESSURE: 127 MMHG | DIASTOLIC BLOOD PRESSURE: 86 MMHG | HEART RATE: 72 BPM | RESPIRATION RATE: 16 BRPM

## 2019-08-14 DIAGNOSIS — Z98.890 OTHER SPECIFIED POSTPROCEDURAL STATES: Chronic | ICD-10-CM

## 2019-08-14 PROCEDURE — 99283 EMERGENCY DEPT VISIT LOW MDM: CPT

## 2019-08-14 RX ORDER — MORPHINE SULFATE 50 MG/1
60 CAPSULE, EXTENDED RELEASE ORAL ONCE
Refills: 0 | Status: DISCONTINUED | OUTPATIENT
Start: 2019-08-14 | End: 2019-08-14

## 2019-08-14 RX ADMIN — MORPHINE SULFATE 60 MILLIGRAM(S): 50 CAPSULE, EXTENDED RELEASE ORAL at 12:30

## 2019-08-14 RX ADMIN — MORPHINE SULFATE 60 MILLIGRAM(S): 50 CAPSULE, EXTENDED RELEASE ORAL at 11:39

## 2019-08-14 NOTE — ED PROVIDER NOTE - ATTENDING CONTRIBUTION TO CARE
Patient is a 69 y.o female with PMHx of early onset Alzheimer dz, hypothyroid, Chronic Back pain (follows with pain management) on Morphine ER 60 mg BID, Chronic RLE wound x 13-14 months being followed by Dr. Evans who presents to ED for worsening pain of RLE and dressing change. Plan- consult Dr. Evans who will come to ED to evaluate. Will give dose of home medication.  dr evans removed elizabeth boot placed a device to suction the drainage  Dr. Espino:  I have reviewed and discussed with the PA/ resident the case specifics, including the history, physical assessment, evaluation, conclusion, laboratory results, and medical plan. I agree with the contents, and conclusions. I have personally examined, and interviewed the patient.

## 2019-08-14 NOTE — ED PROVIDER NOTE - OBJECTIVE STATEMENT
HPI: Patient is a 69 y.o female with PMHx of early onset Alzheimer dz, hypothyroid, Chronic Back pain (follows with pain management) on Morphine ER 60 mg BID, Chronic RLE wound x 13-14 months being followed by Dr. Evans who presents to ED for worsening pain of RLE and dressing change. Pt from [facility], presents to ED with staff. States patient saw Dr. Evans little over 1 week ago and had Reta Boot placed to RLE and biopsy done and was suppose to follow up few days ago but missed appointment. Today having worsening pain to RLE, did not take pain medication this AM. Staff got in touch with Dr. Evans and was advised to come to ED for dressing change. Pt only admits to RLE pain. Denies any fevers, chills, numbness or tingling, weakness, back pain, neck pain, cp, sob or any other complaints.

## 2019-08-14 NOTE — CONSULT NOTE ADULT - ASSESSMENT
Chronic open wound right leg with moderate drainage  -negative pressure dressing with KCI snap device, change 2 times per week  -case management to arrange for home care  -elevate when in bed. Ace bandage when out of bed  -followup 1 week with dr. garzon

## 2019-08-14 NOTE — ED PROVIDER NOTE - PLAN OF CARE
Patient advised to follow up with PRIMARY CARE DOCTOR IN 1-2 DAYS AND DR. NOBLE WITHIN WEEK and told to return to the emergency department immediately for any new or concerning symptoms such as worsening pain, fevers, chills OR ANY OTHER COMPLAINTS. Patient agrees with plan.    Continue home medications   Rest, keep area clean, dry and intact   Follow up with surgeon Dr. Noble for further management

## 2019-08-14 NOTE — ED PROVIDER NOTE - CLINICAL SUMMARY MEDICAL DECISION MAKING FREE TEXT BOX
Patient is a 69 y.o female with PMHx of early onset Alzheimer dz, hypothyroid, Chronic Back pain (follows with pain management) on Morphine ER 60 mg BID, Chronic RLE wound x 13-14 months being followed by Dr. Evans who presents to ED for worsening pain of RLE and dressing change. Plan- consult Dr. Evans who will come to ED to evaluate. Will give dose of home medication.

## 2019-08-14 NOTE — ED PROVIDER NOTE - CARE PROVIDER_API CALL
Amara Evans)  Plastic Surgery  [address] [place] [address], [address]  [place] [address]  Phone: [phone]  Fax: [phone]  Follow Up Time: 4-6 Days

## 2019-08-14 NOTE — ED PROVIDER NOTE - PHYSICAL EXAMINATION
Vital signs reviewed.   CONSTITUTIONAL: Well-appearing; well-nourished; in no apparent distress. Non-toxic appearing.   HEAD: Normocephalic, atraumatic.  EYES: PERRL, EOM intact, conjunctiva and sclera WNL.  ENT: normal nose; no rhinorrhea;   NECK: Trachea midline   CARD: Normal S1, S2; no murmurs, rubs, or gallops noted.  RESP: Normal chest excursion with respiration; breath sounds clear and equal bilaterally; no wheezes, rhonchi, or rales.  EXT/MS: moves all extremities; distal pulses are normal, no pedal edema. +RLE Reta boot in place. Normal sensation. Normal pulses.   SKIN: Normal for age and race; Dressing in place on RLE appears C/D/I.   NEURO: Awake, alert, oriented x 3, no gross deficits, no motor or sensory deficit noted.  PSYCH: Normal mood; appropriate affect.

## 2019-08-14 NOTE — ED PROVIDER NOTE - CARE PLAN
Principal Discharge DX:	Right leg pain  Assessment and plan of treatment:	Patient advised to follow up with PRIMARY CARE DOCTOR IN 1-2 DAYS AND DR. NOBLE WITHIN WEEK and told to return to the emergency department immediately for any new or concerning symptoms such as worsening pain, fevers, chills OR ANY OTHER COMPLAINTS. Patient agrees with plan.    Continue home medications   Rest, keep area clean, dry and intact   Follow up with surgeon Dr. Noble for further management  Secondary Diagnosis:	Wound of right lower extremity, initial encounter

## 2019-08-14 NOTE — ED PROVIDER NOTE - PROGRESS NOTE DETAILS
MOUNIKA Keller- Dr. Espino discussed with Dr. Evans that patient currently in ED. Dr. Evans states she will come to ED to change dressing. Will monitor closely. MOUNIKA Keller- Patient seen and evaluated by Dr. Evans, SW consulted to set up wound care/dressing assistance at patients residence. Pt stable for dc home and outpatient follow up with PCP and Surgeon Dr. Evans. All questions and concerns addressed. Strict return instructions given. No complaints noted.

## 2019-08-14 NOTE — CONSULT NOTE ADULT - SUBJECTIVE AND OBJECTIVE BOX
CC:  Patient is a 69y old  Female who presents with a chief complaint of unna boot to right leg ulcer for 1 week.    HPI:  open wound of right leg since 9-10 months.  Unna boot applied and painful for patient.  She was not able to followup in office.    PAST MEDICAL & SURGICAL HISTORY:  Wound  Alzheimer disease  Chronic back pain  Hypothyroid  History of arthroplasty of left hip      Allergies    Talwin (Unknown)    Intolerances        SOCIAL HISTORY          Smoking: Yes [ ]  No [ ]   ______pk yrs          ETOH  Yes [ ]  No [ ]  Social [ ]          DRUGS:  Yes [ ]  No [ ]  if so what______________    FAMILY HISTORY:  No pertinent family history in first degree relatives      MEDICATIONS  (STANDING):    MEDICATIONS  (PRN):         Review of systems:  General:  no fever or chills  Pulmonary:  no SOB  Cardiac:  denies chest pain, palpitations  GI:  no nausea, vomitting, or abddominal pain  :  no increase frequency, or burning  Heme:  no easy bruising with minor trauma  Musculoskeletal:  No history of back pain or trauma  Skin:  no history of rashes, injury, trauma  Neuro:  no weakness         Vital Signs Last 24 Hrs  T(C): 36.8 (14 Aug 2019 10:24), Max: 36.8 (14 Aug 2019 10:24)  T(F): 98.2 (14 Aug 2019 10:24), Max: 98.2 (14 Aug 2019 10:24)  HR: 72 (14 Aug 2019 12:30) (72 - 77)  BP: 127/86 (14 Aug 2019 12:30) (127/86 - 138/97)  BP(mean): --  RR: 16 (14 Aug 2019 12:30) (16 - 18)  SpO2: 98% (14 Aug 2019 12:30) (98% - 98%)    Physical Exam:    General:   o distress  Extremities:  5.5 x 3 cm open wound middle third anterior medial right leg, with periwound inflammation.  2 small .5 x.5 cm abrasion 2 o'clock.  Main wound granulating.  DP pulse palpable      RADIOLOGY & ADDITIONAL STUDIES:    Risks, benefits, and alternatives to treatment discussed. All questions answered with understanding.    Procedure Performed:  ( x )Yes, KCI SNaP negative pressure device applied    (  )No  Name of Procedure:      [  ]Debridement     [  ]I&D    [  ]Laceration Repair     [  ]Other:  (  )partial thickness     (  )full thickness     (  )subcutaneous     (  )muscle/tendon     (  )bone  (  )sharp     (  )surgical

## 2019-09-05 ENCOUNTER — INPATIENT (INPATIENT)
Facility: HOSPITAL | Age: 69
LOS: 3 days | Discharge: NURSING, REHAB OR LONG-TERM CARE | DRG: 574 | End: 2019-09-09
Attending: INTERNAL MEDICINE | Admitting: HOSPITALIST
Payer: MEDICARE

## 2019-09-05 ENCOUNTER — TRANSCRIPTION ENCOUNTER (OUTPATIENT)
Age: 69
End: 2019-09-05

## 2019-09-05 VITALS
HEART RATE: 80 BPM | OXYGEN SATURATION: 97 % | WEIGHT: 100.09 LBS | SYSTOLIC BLOOD PRESSURE: 123 MMHG | HEIGHT: 64 IN | TEMPERATURE: 98 F | DIASTOLIC BLOOD PRESSURE: 76 MMHG | RESPIRATION RATE: 18 BRPM

## 2019-09-05 DIAGNOSIS — L97.919 NON-PRESSURE CHRONIC ULCER OF UNSPECIFIED PART OF RIGHT LOWER LEG WITH UNSPECIFIED SEVERITY: ICD-10-CM

## 2019-09-05 DIAGNOSIS — Z98.890 OTHER SPECIFIED POSTPROCEDURAL STATES: Chronic | ICD-10-CM

## 2019-09-05 LAB
ALBUMIN SERPL ELPH-MCNC: 3.3 G/DL — SIGNIFICANT CHANGE UP (ref 3.3–5)
ALP SERPL-CCNC: 81 U/L — SIGNIFICANT CHANGE UP (ref 40–120)
ALT FLD-CCNC: 77 U/L — HIGH (ref 10–45)
ANION GAP SERPL CALC-SCNC: 3 MMOL/L — LOW (ref 5–17)
APTT BLD: 35.3 SEC — SIGNIFICANT CHANGE UP (ref 27.5–36.3)
AST SERPL-CCNC: 80 U/L — HIGH (ref 10–40)
BASOPHILS # BLD AUTO: 0.02 K/UL — SIGNIFICANT CHANGE UP (ref 0–0.2)
BASOPHILS NFR BLD AUTO: 0.9 % — SIGNIFICANT CHANGE UP (ref 0–2)
BILIRUB SERPL-MCNC: 0.5 MG/DL — SIGNIFICANT CHANGE UP (ref 0.2–1.2)
BLD GP AB SCN SERPL QL: SIGNIFICANT CHANGE UP
BUN SERPL-MCNC: 32 MG/DL — HIGH (ref 7–23)
CALCIUM SERPL-MCNC: 9.4 MG/DL — SIGNIFICANT CHANGE UP (ref 8.4–10.5)
CHLORIDE SERPL-SCNC: 101 MMOL/L — SIGNIFICANT CHANGE UP (ref 96–108)
CO2 SERPL-SCNC: 34 MMOL/L — HIGH (ref 22–31)
CREAT SERPL-MCNC: 0.95 MG/DL — SIGNIFICANT CHANGE UP (ref 0.5–1.3)
EOSINOPHIL # BLD AUTO: 0.15 K/UL — SIGNIFICANT CHANGE UP (ref 0–0.5)
EOSINOPHIL NFR BLD AUTO: 6.9 % — HIGH (ref 0–6)
GLUCOSE SERPL-MCNC: 86 MG/DL — SIGNIFICANT CHANGE UP (ref 70–99)
HCT VFR BLD CALC: 38.1 % — SIGNIFICANT CHANGE UP (ref 34.5–45)
HGB BLD-MCNC: 12.7 G/DL — SIGNIFICANT CHANGE UP (ref 11.5–15.5)
IMM GRANULOCYTES NFR BLD AUTO: 0 % — SIGNIFICANT CHANGE UP (ref 0–1.5)
INR BLD: 1.21 RATIO — HIGH (ref 0.88–1.16)
LYMPHOCYTES # BLD AUTO: 1.13 K/UL — SIGNIFICANT CHANGE UP (ref 1–3.3)
LYMPHOCYTES # BLD AUTO: 51.8 % — HIGH (ref 13–44)
MCHC RBC-ENTMCNC: 27.9 PG — SIGNIFICANT CHANGE UP (ref 27–34)
MCHC RBC-ENTMCNC: 33.3 GM/DL — SIGNIFICANT CHANGE UP (ref 32–36)
MCV RBC AUTO: 83.6 FL — SIGNIFICANT CHANGE UP (ref 80–100)
MONOCYTES # BLD AUTO: 0.27 K/UL — SIGNIFICANT CHANGE UP (ref 0–0.9)
MONOCYTES NFR BLD AUTO: 12.4 % — SIGNIFICANT CHANGE UP (ref 2–14)
NEUTROPHILS # BLD AUTO: 0.61 K/UL — LOW (ref 1.8–7.4)
NEUTROPHILS NFR BLD AUTO: 28 % — LOW (ref 43–77)
NRBC # BLD: 0 /100 WBCS — SIGNIFICANT CHANGE UP (ref 0–0)
PLATELET # BLD AUTO: 121 K/UL — LOW (ref 150–400)
POTASSIUM SERPL-MCNC: 3.7 MMOL/L — SIGNIFICANT CHANGE UP (ref 3.5–5.3)
POTASSIUM SERPL-SCNC: 3.7 MMOL/L — SIGNIFICANT CHANGE UP (ref 3.5–5.3)
PROT SERPL-MCNC: 7.9 G/DL — SIGNIFICANT CHANGE UP (ref 6–8.3)
PROTHROM AB SERPL-ACNC: 13.6 SEC — HIGH (ref 10–12.9)
RBC # BLD: 4.56 M/UL — SIGNIFICANT CHANGE UP (ref 3.8–5.2)
RBC # FLD: 13.2 % — SIGNIFICANT CHANGE UP (ref 10.3–14.5)
SODIUM SERPL-SCNC: 138 MMOL/L — SIGNIFICANT CHANGE UP (ref 135–145)
WBC # BLD: 2.18 K/UL — LOW (ref 3.8–10.5)
WBC # FLD AUTO: 2.18 K/UL — LOW (ref 3.8–10.5)

## 2019-09-05 PROCEDURE — 71045 X-RAY EXAM CHEST 1 VIEW: CPT | Mod: 26

## 2019-09-05 PROCEDURE — 99406 BEHAV CHNG SMOKING 3-10 MIN: CPT

## 2019-09-05 PROCEDURE — 93010 ELECTROCARDIOGRAM REPORT: CPT

## 2019-09-05 PROCEDURE — 99285 EMERGENCY DEPT VISIT HI MDM: CPT

## 2019-09-05 PROCEDURE — 99223 1ST HOSP IP/OBS HIGH 75: CPT

## 2019-09-05 RX ORDER — MEMANTINE HYDROCHLORIDE 10 MG/1
1 TABLET ORAL
Qty: 0 | Refills: 0 | DISCHARGE

## 2019-09-05 RX ORDER — MORPHINE SULFATE 50 MG/1
60 CAPSULE, EXTENDED RELEASE ORAL ONCE
Refills: 0 | Status: DISCONTINUED | OUTPATIENT
Start: 2019-09-05 | End: 2019-09-05

## 2019-09-05 RX ORDER — DOCUSATE SODIUM 100 MG
100 CAPSULE ORAL THREE TIMES A DAY
Refills: 0 | Status: DISCONTINUED | OUTPATIENT
Start: 2019-09-05 | End: 2019-09-06

## 2019-09-05 RX ORDER — ALPRAZOLAM 0.25 MG
2.5 TABLET ORAL
Qty: 0 | Refills: 0 | DISCHARGE

## 2019-09-05 RX ORDER — ALPRAZOLAM 0.25 MG
1 TABLET ORAL
Qty: 0 | Refills: 0 | DISCHARGE

## 2019-09-05 RX ORDER — MORPHINE SULFATE 50 MG/1
60 CAPSULE, EXTENDED RELEASE ORAL
Refills: 0 | Status: DISCONTINUED | OUTPATIENT
Start: 2019-09-05 | End: 2019-09-06

## 2019-09-05 RX ORDER — SENNA PLUS 8.6 MG/1
2 TABLET ORAL AT BEDTIME
Refills: 0 | Status: DISCONTINUED | OUTPATIENT
Start: 2019-09-05 | End: 2019-09-06

## 2019-09-05 RX ORDER — ALBUTEROL 90 UG/1
2 AEROSOL, METERED ORAL EVERY 6 HOURS
Refills: 0 | Status: DISCONTINUED | OUTPATIENT
Start: 2019-09-05 | End: 2019-09-06

## 2019-09-05 RX ORDER — LEVOTHYROXINE SODIUM 125 MCG
150 TABLET ORAL DAILY
Refills: 0 | Status: DISCONTINUED | OUTPATIENT
Start: 2019-09-05 | End: 2019-09-06

## 2019-09-05 RX ORDER — VANCOMYCIN HCL 1 G
1000 VIAL (EA) INTRAVENOUS EVERY 12 HOURS
Refills: 0 | Status: DISCONTINUED | OUTPATIENT
Start: 2019-09-05 | End: 2019-09-05

## 2019-09-05 RX ORDER — ACETAMINOPHEN 500 MG
650 TABLET ORAL EVERY 6 HOURS
Refills: 0 | Status: DISCONTINUED | OUTPATIENT
Start: 2019-09-05 | End: 2019-09-06

## 2019-09-05 RX ORDER — DONEPEZIL HYDROCHLORIDE 10 MG/1
10 TABLET, FILM COATED ORAL AT BEDTIME
Refills: 0 | Status: DISCONTINUED | OUTPATIENT
Start: 2019-09-05 | End: 2019-09-06

## 2019-09-05 RX ORDER — ALPRAZOLAM 0.25 MG
3 TABLET ORAL EVERY 12 HOURS
Refills: 0 | Status: DISCONTINUED | OUTPATIENT
Start: 2019-09-05 | End: 2019-09-06

## 2019-09-05 RX ORDER — MEMANTINE HYDROCHLORIDE 10 MG/1
10 TABLET ORAL
Refills: 0 | Status: DISCONTINUED | OUTPATIENT
Start: 2019-09-05 | End: 2019-09-06

## 2019-09-05 RX ORDER — NICOTINE POLACRILEX 2 MG
1 GUM BUCCAL DAILY
Refills: 0 | Status: DISCONTINUED | OUTPATIENT
Start: 2019-09-05 | End: 2019-09-06

## 2019-09-05 RX ORDER — VANCOMYCIN HCL 1 G
750 VIAL (EA) INTRAVENOUS EVERY 12 HOURS
Refills: 0 | Status: DISCONTINUED | OUTPATIENT
Start: 2019-09-05 | End: 2019-09-06

## 2019-09-05 RX ORDER — HYDROCHLOROTHIAZIDE 25 MG
12.5 TABLET ORAL DAILY
Refills: 0 | Status: DISCONTINUED | OUTPATIENT
Start: 2019-09-05 | End: 2019-09-06

## 2019-09-05 RX ORDER — INFLUENZA VIRUS VACCINE 15; 15; 15; 15 UG/.5ML; UG/.5ML; UG/.5ML; UG/.5ML
0.5 SUSPENSION INTRAMUSCULAR ONCE
Refills: 0 | Status: COMPLETED | OUTPATIENT
Start: 2019-09-05 | End: 2019-09-09

## 2019-09-05 RX ORDER — ASPIRIN/CALCIUM CARB/MAGNESIUM 324 MG
1 TABLET ORAL
Qty: 0 | Refills: 0 | DISCHARGE

## 2019-09-05 RX ADMIN — Medication 100 MILLIGRAM(S): at 22:08

## 2019-09-05 RX ADMIN — MORPHINE SULFATE 60 MILLIGRAM(S): 50 CAPSULE, EXTENDED RELEASE ORAL at 19:35

## 2019-09-05 RX ADMIN — DONEPEZIL HYDROCHLORIDE 10 MILLIGRAM(S): 10 TABLET, FILM COATED ORAL at 22:11

## 2019-09-05 RX ADMIN — MEMANTINE HYDROCHLORIDE 10 MILLIGRAM(S): 10 TABLET ORAL at 18:41

## 2019-09-05 RX ADMIN — Medication 3 MILLIGRAM(S): at 18:41

## 2019-09-05 RX ADMIN — Medication 250 MILLIGRAM(S): at 19:38

## 2019-09-05 RX ADMIN — MORPHINE SULFATE 60 MILLIGRAM(S): 50 CAPSULE, EXTENDED RELEASE ORAL at 15:13

## 2019-09-05 RX ADMIN — MORPHINE SULFATE 60 MILLIGRAM(S): 50 CAPSULE, EXTENDED RELEASE ORAL at 18:41

## 2019-09-05 RX ADMIN — MORPHINE SULFATE 60 MILLIGRAM(S): 50 CAPSULE, EXTENDED RELEASE ORAL at 16:10

## 2019-09-05 RX ADMIN — Medication 150 MICROGRAM(S): at 18:41

## 2019-09-05 NOTE — H&P ADULT - NSHPREVIEWOFSYSTEMS_GEN_ALL_CORE
Review of Systems:  CONSTITUTIONAL: No weakness, fevers or chills  EYES/ENT: No visual changes;  No vertigo or throat pain   NECK: No pain or stiffness  RESPIRATORY: No cough, wheezing, hemoptysis; No shortness of breath,   CARDIOVASCULAR: No chest pain or palpitations  GASTROINTESTINAL: No abdominal or epigastric pain. No nausea, vomiting, or hematemesis; No diarrhea or constipation.   GENITOURINARY: No dysuria, frequency or hematuria  NEUROLOGICAL: No numbness or weakness  SKIN: +rle ulcerated lesion  All other review of systems is negative unless indicated above

## 2019-09-05 NOTE — ED ADULT NURSE REASSESSMENT NOTE - NS ED NURSE REASSESS COMMENT FT1
contact numbers: [facility] [phone] ext. [phone] for Coretta Burch  or ext. [phone] Jackie Cavazos

## 2019-09-05 NOTE — ED PROVIDER NOTE - ATTENDING CONTRIBUTION TO CARE
68 y/o F with PMH of early Alzheimer Dementia, Chronic RLE ulcer x 1 year, which she follows with Dr. Evans for. Patient was sent to the ED by Dr. Evans for admission to have a skin graft placed tomorrow.  Per Jackie she spoke with Dr. Evans yesterday, since patient failed unna boot and wound vac treatments, she wants to place a skin graft. Patient denies f/c, n/v, CP, SOB, palpitations, or all other complaints. PE as noted above. labs reviewed, CXR negative. Spoke with hospitalist Dr. Tran, patient will be admitted  Dr. Espino:  I have reviewed and discussed with the PA/ resident the case specifics, including the history, physical assessment, evaluation, conclusion, laboratory results, and medical plan. I agree with the contents, and conclusions. I have personally examined, and interviewed the patient.

## 2019-09-05 NOTE — H&P ADULT - HISTORY OF PRESENT ILLNESS
69 year old female with pmh of hypothyroidism, tobacco abuse, chronic back pain, anxiety, alzheimer's dementia coming to hospital for graft to chronic rle ulcer. States has been going on for over 1 year. follows with Dr Evans. original injury occured when patient bumped into metal shelving. states has had multiple different therapies and abx tried in past including but not limited to unaboot, and wound vac. states no cp sob nausea vomiting diarrhea or fever. states is painful to touch when touch injury. [facility] [place] follows wound care instructions per Dr Evans however is current smoker.

## 2019-09-05 NOTE — CONSULT NOTE PEDS - SUBJECTIVE AND OBJECTIVE BOX
CC:  Patient is a 69y old  Female who presents with a chief complaint of wound ulcer for debridement and possible skin graft in am (05 Sep 2019 17:41)      HPI:  69 year old female with pmh of hypothyroidism, tobacco abuse, chronic back pain, anxiety, alzheimer's dementia coming to hospital for graft to chronic rle ulcer. States has been going on for over 1 year. follows with Dr Evans. original injury occured when patient bumped into metal shelving. states has had multiple different therapies and abx tried in past including but not limited to unaboot, and wound vac. states no cp sob nausea vomiting diarrhea or fever. states is painful to touch when touch injury. states follows wound care instructions per Dr Evans however is current smoker. (05 Sep 2019 17:41)    open wound for more than one year.  MRI negative OM, prior debridement showed no CA, seen by Dr Rosa who states pt has sufficient flow to heal    PAST MEDICAL & SURGICAL HISTORY:  Smoker  COPD (chronic obstructive pulmonary disease)  Wound  Alzheimer disease  Chronic back pain  Hypothyroid  History of arthroplasty of left hip      Allergies    Talwin (Unknown)    Intolerances        SOCIAL HISTORY          Smoking: Yes [x ]  No [ ]   ______pk yrs          ETOH  Yes [ ]  No [ ]  Social [ ]          DRUGS:  Yes [ ]  No [ ]  if so what______________    FAMILY HISTORY:  FH: coronary artery disease      MEDICATIONS  (STANDING):  ALPRAZolam 3 milliGRAM(s) Oral every 12 hours  docusate sodium 100 milliGRAM(s) Oral three times a day  donepezil 10 milliGRAM(s) Oral at bedtime  hydrochlorothiazide 12.5 milliGRAM(s) Oral daily  influenza   Vaccine 0.5 milliLiter(s) IntraMuscular once  levothyroxine 150 MICROGram(s) Oral daily  memantine 10 milliGRAM(s) Oral two times a day  morphine ER Tablet 60 milliGRAM(s) Oral two times a day  multivitamin 1 Tablet(s) Oral daily  nicotine - 21 mG/24Hr(s) Patch 1 patch Transdermal daily  vancomycin  IVPB 750 milliGRAM(s) IV Intermittent every 12 hours    MEDICATIONS  (PRN):  acetaminophen   Tablet .. 650 milliGRAM(s) Oral every 6 hours PRN Temp greater or equal to 38C (100.4F), Mild Pain (1 - 3), Moderate Pain (4 - 6)  ALBUTerol    90 MICROgram(s) HFA Inhaler 2 Puff(s) Inhalation every 6 hours PRN Shortness of Breath and/or Wheezing  senna 2 Tablet(s) Oral at bedtime PRN Constipation         Review of systems:  General:  no fever or chills  Pulmonary:  no SOB  Cardiac:  denies chest pain, palpitations  GI:  no nausea, vomitting, or abddominal pain  :  no increase frequency, or burning  Heme:  no easy bruising with minor trauma  Musculoskeletal:  No history of back pain or trauma  Skin:  no history of rashes, injury, trauma  Neuro:  no weakness         Vital Signs Last 24 Hrs  T(C): 37 (05 Sep 2019 22:10), Max: 37 (05 Sep 2019 22:10)  T(F): 98.6 (05 Sep 2019 22:10), Max: 98.6 (05 Sep 2019 22:10)  HR: 66 (05 Sep 2019 22:10) (62 - 80)  BP: 100/57 (05 Sep 2019 22:10) (98/60 - 123/76)  BP(mean): --  RR: 15 (05 Sep 2019 22:10) (15 - 18)  SpO2: 98% (05 Sep 2019 22:10) (96% - 98%)    Physical Exam:    General:  Appears thin, no distress  Extremities:  6 cm x 4 cm x .3 cm open wound right pretibia with beto wound skin inflammation, leg and foot warm, fibrinous necrotic tissue mixed with granulation tissue  Skin:     other:  Musculoskeletal:    Neuro/Psych:  Alert, oriented tp time, place and person       LABS:                        12.7   2.18  )-----------( 121      ( 05 Sep 2019 14:50 )             38.1     09-05    138  |  101  |  32<H>  ----------------------------<  86  3.7   |  34<H>  |  0.95    Ca    9.4      05 Sep 2019 14:50    TPro  7.9  /  Alb  3.3  /  TBili  0.5  /  DBili  x   /  AST  80<H>  /  ALT  77<H>  /  AlkPhos  81  09-05    PT/INR - ( 05 Sep 2019 14:50 )   PT: 13.6 sec;   INR: 1.21 ratio         PTT - ( 05 Sep 2019 14:50 )  PTT:35.3 sec      RADIOLOGY & ADDITIONAL STUDIES:    Risks, benefits, and alternatives to treatment discussed. All questions answered with understanding.    Procedure Performed:  (  )Yes     (  x)No  Name of Procedure:      [  ]Debridement     [  ]I&D    [  ]Laceration Repair     [  ]Other:  (  )partial thickness     (  )full thickness     (  )subcutaneous     (  )muscle/tendon     (  )bone  (  )sharp     (  )surgical

## 2019-09-05 NOTE — ED PROVIDER NOTE - PHYSICAL EXAMINATION
Skin/Msk: +superficial ulcer to the medial aspect of the RLE. No signs of active infection  no necrotic tissue noted.   +palpable pedal pulses  no neurovascular compromise noted

## 2019-09-05 NOTE — H&P ADULT - ASSESSMENT
69 year old female with pmh of hypothyroidism, tobacco abuse, chronic back pain, anxiety, alzheimer's dementia coming to hospital for graft to chronic rle ulcer.     #chronic rle ulcer/wound  - admit to medicine randy  - for or tomorrow w/ Dr Evans- would like preop labs and vanco bid hold chemical a/c  - vanco   - wound care  - plastic surgery consult - Dr Evans     #hypothyroidism   - levothyroxine   - stable    #copd   - stable   - secondary to nicotine abuse  - albuterol    #chronic back pain w/ anxiety   - morphine   - xanan  - home dose confirmed via istop [id number]    # dementia - alzheimers type  - aricept   - memantine  - stable    #tobacco abuse  - 5 minutes of counselling spent on cessation   - nicotine patch    #dvt prophylaxis  - ambulate w/ assistance  IMPROVE VTE Individual Risk Assessment  RISK                                                                Points  [  ] Previous VTE                                                  3  [  ] Thrombophilia                                               2  [  ] Lower limb paralysis                                      2        (unable to hold up >15 seconds)    [  ] Current Cancer                                              2         (within 6 months)  [  ] Immobilization > 24 hrs                                1  [  ] ICU/CCU stay > 24 hours                              1  [x  ] Age > 60                                                      1  IMPROVE VTE Score __1_______  IMPROVE Score 0-1: Low Risk, No VTE prophylaxis required for most patients, encourage ambulation.   IMPROVE Score 2-3: At risk, pharmacologic VTE prophylaxis is indicated for most patients (in the absence of a contraindication)  IMPROVE Score > or = 4: High Risk, pharmacologic VTE prophylaxis is indicated for most patients (in the absence of a contraindication)

## 2019-09-05 NOTE — CONSULT NOTE PEDS - ASSESSMENT
chronic nonhealing right leg wound.    -debridement and possible skin graft and VAC in a.m.  Also possible acell burn matrix to donor site.  Pt agrees for right upper to be donor site.

## 2019-09-05 NOTE — ED ADULT NURSE NOTE - PMH
Alzheimer disease    Chronic back pain    COPD (chronic obstructive pulmonary disease)    Hypothyroid    Smoker    Wound

## 2019-09-05 NOTE — ED ADULT NURSE NOTE - CHPI ED NUR SYMPTOMS NEG
no purulent drainage/no redness/no drainage/no chills/no blood in mucus/no bleeding at site/no fever/no rectal pain/no vomiting

## 2019-09-05 NOTE — ED ADULT NURSE NOTE - OBJECTIVE STATEMENT
Pt sent to ED from [facility] by Dr Evans for admission for possible skin grafting for tomorrow morning. Pt states she has been being followed by Dr Evans for wound on her right LE for 1 year that started from an antique object. Pt c/o pain 10/10, reports she was seen by pain management doctor yesterday and has not taken her prescribed pain medications today. Denies any fever.

## 2019-09-05 NOTE — ED PROVIDER NOTE - CLINICAL SUMMARY MEDICAL DECISION MAKING FREE TEXT BOX
68 y/o F with PMH of early Alzheimer Dementia, Chronic RLE ulcer x 1 year, which she follows with Dr. Evans for. Patient was sent to the ED by Dr. Evans for admission to have a skin graft placed tomorrow.  Per Jackie she spoke with Dr. Evans yesterday, since patient failed unna boot and wound vac treatments, she wants to place a skin graft. Patient denies f/c, n/v, CP, SOB, palpitations, or all other complaints. PE as noted above. labs reviewed, CXR negative. Spoke with hospitalist Dr. Tran, patient will be admitted

## 2019-09-05 NOTE — H&P ADULT - NSICDXPASTMEDICALHX_GEN_ALL_CORE_FT
PAST MEDICAL HISTORY:  Alzheimer disease     Chronic back pain     COPD (chronic obstructive pulmonary disease)     Hypothyroid     Smoker     Wound

## 2019-09-05 NOTE — ED PROVIDER NOTE - OBJECTIVE STATEMENT
Patient accompanied with Jackie Cavazos, peer representative from [facility]  68 y/o F with PMH of early Alzheimer Dementia, Chronic RLE ulcer x 1 year, which she follows with Dr. Evans for. Patient was sent to the ED by Dr. Evans for admission to have a skin graft placed tomorrow.  Per Jackie she spoke with Dr. Evans yesterday, since patient failed unna boot and wound vac treatments, she wants to place a skin graft. Patient denies f/c, n/v, CP, SOB, palpitations, or all other complaints

## 2019-09-06 ENCOUNTER — RESULT REVIEW (OUTPATIENT)
Age: 69
End: 2019-09-06

## 2019-09-06 LAB
ANION GAP SERPL CALC-SCNC: 5 MMOL/L — SIGNIFICANT CHANGE UP (ref 5–17)
ANISOCYTOSIS BLD QL: SLIGHT — SIGNIFICANT CHANGE UP
BASOPHILS # BLD AUTO: 0.02 K/UL — SIGNIFICANT CHANGE UP (ref 0–0.2)
BASOPHILS NFR BLD AUTO: 1.1 % — SIGNIFICANT CHANGE UP (ref 0–2)
BUN SERPL-MCNC: 25 MG/DL — HIGH (ref 7–23)
CALCIUM SERPL-MCNC: 8.5 MG/DL — SIGNIFICANT CHANGE UP (ref 8.4–10.5)
CHLORIDE SERPL-SCNC: 102 MMOL/L — SIGNIFICANT CHANGE UP (ref 96–108)
CO2 SERPL-SCNC: 30 MMOL/L — SIGNIFICANT CHANGE UP (ref 22–31)
CREAT SERPL-MCNC: 0.99 MG/DL — SIGNIFICANT CHANGE UP (ref 0.5–1.3)
EOSINOPHIL # BLD AUTO: 0.1 K/UL — SIGNIFICANT CHANGE UP (ref 0–0.5)
EOSINOPHIL NFR BLD AUTO: 5.3 % — SIGNIFICANT CHANGE UP (ref 0–6)
GLUCOSE SERPL-MCNC: 132 MG/DL — HIGH (ref 70–99)
HCT VFR BLD CALC: 32.7 % — LOW (ref 34.5–45)
HGB BLD-MCNC: 10.7 G/DL — LOW (ref 11.5–15.5)
HYPOCHROMIA BLD QL: SLIGHT — SIGNIFICANT CHANGE UP
IMM GRANULOCYTES NFR BLD AUTO: 0.5 % — SIGNIFICANT CHANGE UP (ref 0–1.5)
LYMPHOCYTES # BLD AUTO: 1.13 K/UL — SIGNIFICANT CHANGE UP (ref 1–3.3)
LYMPHOCYTES # BLD AUTO: 59.8 % — HIGH (ref 13–44)
MANUAL SMEAR VERIFICATION: SIGNIFICANT CHANGE UP
MCHC RBC-ENTMCNC: 27.6 PG — SIGNIFICANT CHANGE UP (ref 27–34)
MCHC RBC-ENTMCNC: 32.7 GM/DL — SIGNIFICANT CHANGE UP (ref 32–36)
MCV RBC AUTO: 84.3 FL — SIGNIFICANT CHANGE UP (ref 80–100)
MONOCYTES # BLD AUTO: 0.25 K/UL — SIGNIFICANT CHANGE UP (ref 0–0.9)
MONOCYTES NFR BLD AUTO: 13.2 % — SIGNIFICANT CHANGE UP (ref 2–14)
NEUTROPHILS # BLD AUTO: 0.38 K/UL — LOW (ref 1.8–7.4)
NEUTROPHILS NFR BLD AUTO: 20.1 % — LOW (ref 43–77)
NRBC # BLD: 0 /100 WBCS — SIGNIFICANT CHANGE UP (ref 0–0)
PLAT MORPH BLD: NORMAL — SIGNIFICANT CHANGE UP
PLATELET # BLD AUTO: 107 K/UL — LOW (ref 150–400)
POTASSIUM SERPL-MCNC: 3.6 MMOL/L — SIGNIFICANT CHANGE UP (ref 3.5–5.3)
POTASSIUM SERPL-SCNC: 3.6 MMOL/L — SIGNIFICANT CHANGE UP (ref 3.5–5.3)
RBC # BLD: 3.88 M/UL — SIGNIFICANT CHANGE UP (ref 3.8–5.2)
RBC # FLD: 13.1 % — SIGNIFICANT CHANGE UP (ref 10.3–14.5)
RBC BLD AUTO: ABNORMAL
SODIUM SERPL-SCNC: 137 MMOL/L — SIGNIFICANT CHANGE UP (ref 135–145)
WBC # BLD: 1.89 K/UL — LOW (ref 3.8–10.5)
WBC # FLD AUTO: 1.89 K/UL — LOW (ref 3.8–10.5)

## 2019-09-06 PROCEDURE — 88346 IMFLUOR 1ST 1ANTB STAIN PX: CPT | Mod: 26

## 2019-09-06 PROCEDURE — 99233 SBSQ HOSP IP/OBS HIGH 50: CPT

## 2019-09-06 PROCEDURE — 88350 IMFLUOR EA ADDL 1ANTB STN PX: CPT | Mod: 26

## 2019-09-06 PROCEDURE — 99223 1ST HOSP IP/OBS HIGH 75: CPT

## 2019-09-06 PROCEDURE — 88305 TISSUE EXAM BY PATHOLOGIST: CPT | Mod: 26

## 2019-09-06 RX ORDER — FILGRASTIM 480MCG/1.6
300 VIAL (ML) INJECTION ONCE
Refills: 0 | Status: COMPLETED | OUTPATIENT
Start: 2019-09-06 | End: 2019-09-06

## 2019-09-06 RX ORDER — VANCOMYCIN HCL 1 G
750 VIAL (EA) INTRAVENOUS EVERY 12 HOURS
Refills: 0 | Status: DISCONTINUED | OUTPATIENT
Start: 2019-09-06 | End: 2019-09-07

## 2019-09-06 RX ORDER — MORPHINE SULFATE 50 MG/1
4 CAPSULE, EXTENDED RELEASE ORAL EVERY 4 HOURS
Refills: 0 | Status: DISCONTINUED | OUTPATIENT
Start: 2019-09-06 | End: 2019-09-07

## 2019-09-06 RX ORDER — MORPHINE SULFATE 50 MG/1
60 CAPSULE, EXTENDED RELEASE ORAL
Refills: 0 | Status: DISCONTINUED | OUTPATIENT
Start: 2019-09-06 | End: 2019-09-09

## 2019-09-06 RX ORDER — SODIUM CHLORIDE 9 MG/ML
1000 INJECTION, SOLUTION INTRAVENOUS
Refills: 0 | Status: DISCONTINUED | OUTPATIENT
Start: 2019-09-06 | End: 2019-09-06

## 2019-09-06 RX ORDER — SODIUM CHLORIDE 9 MG/ML
500 INJECTION INTRAMUSCULAR; INTRAVENOUS; SUBCUTANEOUS ONCE
Refills: 0 | Status: COMPLETED | OUTPATIENT
Start: 2019-09-06 | End: 2019-09-06

## 2019-09-06 RX ORDER — PIPERACILLIN AND TAZOBACTAM 4; .5 G/20ML; G/20ML
3.38 INJECTION, POWDER, LYOPHILIZED, FOR SOLUTION INTRAVENOUS EVERY 8 HOURS
Refills: 0 | Status: DISCONTINUED | OUTPATIENT
Start: 2019-09-06 | End: 2019-09-09

## 2019-09-06 RX ORDER — HYDROMORPHONE HYDROCHLORIDE 2 MG/ML
0.5 INJECTION INTRAMUSCULAR; INTRAVENOUS; SUBCUTANEOUS
Refills: 0 | Status: DISCONTINUED | OUTPATIENT
Start: 2019-09-06 | End: 2019-09-06

## 2019-09-06 RX ORDER — ONDANSETRON 8 MG/1
4 TABLET, FILM COATED ORAL ONCE
Refills: 0 | Status: DISCONTINUED | OUTPATIENT
Start: 2019-09-06 | End: 2019-09-06

## 2019-09-06 RX ADMIN — Medication 100 MILLIGRAM(S): at 05:34

## 2019-09-06 RX ADMIN — MORPHINE SULFATE 60 MILLIGRAM(S): 50 CAPSULE, EXTENDED RELEASE ORAL at 18:40

## 2019-09-06 RX ADMIN — SODIUM CHLORIDE 1000 MILLILITER(S): 9 INJECTION INTRAMUSCULAR; INTRAVENOUS; SUBCUTANEOUS at 23:36

## 2019-09-06 RX ADMIN — MEMANTINE HYDROCHLORIDE 10 MILLIGRAM(S): 10 TABLET ORAL at 05:34

## 2019-09-06 RX ADMIN — SODIUM CHLORIDE 1000 MILLILITER(S): 9 INJECTION INTRAMUSCULAR; INTRAVENOUS; SUBCUTANEOUS at 21:57

## 2019-09-06 RX ADMIN — Medication 250 MILLIGRAM(S): at 18:10

## 2019-09-06 RX ADMIN — Medication 650 MILLIGRAM(S): at 05:30

## 2019-09-06 RX ADMIN — PIPERACILLIN AND TAZOBACTAM 25 GRAM(S): 4; .5 INJECTION, POWDER, LYOPHILIZED, FOR SOLUTION INTRAVENOUS at 22:47

## 2019-09-06 RX ADMIN — MORPHINE SULFATE 60 MILLIGRAM(S): 50 CAPSULE, EXTENDED RELEASE ORAL at 05:36

## 2019-09-06 RX ADMIN — Medication 12.5 MILLIGRAM(S): at 05:34

## 2019-09-06 RX ADMIN — MORPHINE SULFATE 4 MILLIGRAM(S): 50 CAPSULE, EXTENDED RELEASE ORAL at 16:41

## 2019-09-06 RX ADMIN — HYDROMORPHONE HYDROCHLORIDE 0.5 MILLIGRAM(S): 2 INJECTION INTRAMUSCULAR; INTRAVENOUS; SUBCUTANEOUS at 14:18

## 2019-09-06 RX ADMIN — Medication 3 MILLIGRAM(S): at 05:33

## 2019-09-06 RX ADMIN — HYDROMORPHONE HYDROCHLORIDE 0.5 MILLIGRAM(S): 2 INJECTION INTRAMUSCULAR; INTRAVENOUS; SUBCUTANEOUS at 14:08

## 2019-09-06 RX ADMIN — MORPHINE SULFATE 4 MILLIGRAM(S): 50 CAPSULE, EXTENDED RELEASE ORAL at 23:34

## 2019-09-06 RX ADMIN — MORPHINE SULFATE 60 MILLIGRAM(S): 50 CAPSULE, EXTENDED RELEASE ORAL at 18:10

## 2019-09-06 RX ADMIN — Medication 150 MICROGRAM(S): at 05:34

## 2019-09-06 RX ADMIN — Medication 650 MILLIGRAM(S): at 06:27

## 2019-09-06 RX ADMIN — Medication 250 MILLIGRAM(S): at 05:34

## 2019-09-06 RX ADMIN — Medication 300 MICROGRAM(S): at 18:22

## 2019-09-06 RX ADMIN — MORPHINE SULFATE 4 MILLIGRAM(S): 50 CAPSULE, EXTENDED RELEASE ORAL at 16:56

## 2019-09-06 RX ADMIN — MORPHINE SULFATE 4 MILLIGRAM(S): 50 CAPSULE, EXTENDED RELEASE ORAL at 23:49

## 2019-09-06 RX ADMIN — MORPHINE SULFATE 60 MILLIGRAM(S): 50 CAPSULE, EXTENDED RELEASE ORAL at 06:27

## 2019-09-06 NOTE — BRIEF OPERATIVE NOTE - NSICDXBRIEFPROCEDURE_GEN_ALL_CORE_FT
PROCEDURES:  Debridement of wound using concurrent irrigation and suction device 06-Sep-2019 13:42:39 skin graft from Left thigh donor site to right lower extremity with placement of wound vac Elvia Nguyen

## 2019-09-06 NOTE — CONSULT NOTE ADULT - SUBJECTIVE AND OBJECTIVE BOX
HPI:   Patient is a 69y old female with pmh of hypothyroidism, tobacco abuse, chronic back pain, anxiety, alzheimer's dementia coming to hospital for graft to chronic RLE ulcer. States has been going on for over 1 year. As per chart she is followed by Dr Evans original injury occurred when patient bumped into metal shelving. She has had different therapies and antibiotics  tried in past including but not limited to unaboot, and wound vac. The patient is now post op right LE wound with a placement of a skin graft with vac placement.     Patient of DR Gloria with baseline pancytopenia of unclear etiology,  recent bone marrow 1/2019:  Pathology consistent with normal cellular marrow with mild erythroid hyperplasia, with left shift, and immunophenotyping evidence of a small CD5 + clonal B-cell population (0.8%) no evidence of disease pathogenetic alterations detected in the MARISABEL 12-13, BCR-ABL, MPL, or CALR.Patient remains severely neutropenic ( WBC 1.2 K , ), but overall stable clinically. Denies B symptoms, and is compliant with medication. She has been off some of her psych medications recently, but hematologic picture has not changed.      Hematology consult requested for pancytopenia.     REVIEW OF SYSTEMS:  All other review of systems negative (Comprehensive ROS)    PAST MEDICAL & SURGICAL HISTORY:  Smoker  COPD (chronic obstructive pulmonary disease)  Wound  Alzheimer disease  Chronic back pain  Hypothyroid  History of arthroplasty of left hip      Allergies    Talwin (Unknown)    Intolerances    FAMILY HISTORY:  FH: coronary artery disease    ICU Vital Signs Last 24 Hrs  T(C): 36.3 (06 Sep 2019 14:28), Max: 37 (05 Sep 2019 22:10)  T(F): 97.3 (06 Sep 2019 14:28), Max: 98.6 (05 Sep 2019 22:10)  HR: 67 (06 Sep 2019 14:28) (50 - 83)  BP: 97/56 (06 Sep 2019 14:28) (90/50 - 117/65)  BP(mean): 81 (06 Sep 2019 13:58) (81 - 81)  ABP: --  ABP(mean): --  RR: 17 (06 Sep 2019 14:28) (14 - 18)  SpO2: 93% (06 Sep 2019 14:28) (93% - 98%)          PHYSICAL EXAM:  General: alert, no acute distress  Eyes:  anicteric, no conjunctival injection, no discharge  Oropharynx: no lesions or injection 	  Neck: supple, without adenopathy  Lungs: clear to auscultation  Heart: regular rate and rhythm; no murmur, rubs or gallops  Abdomen: soft, nondistended, nontender, without mass or organomegaly  Skin: no lesions  Extremities: RLE with dressing in place and a wound vac in place   Neurologic: alert, oriented, moves all extremities      MEDICATIONS  (STANDING):  influenza   Vaccine 0.5 milliLiter(s) IntraMuscular once  lactated ringers. 1000 milliLiter(s) (75 mL/Hr) IV Continuous <Continuous>  morphine ER Tablet 60 milliGRAM(s) Oral two times a day  vancomycin  IVPB 750 milliGRAM(s) IV Intermittent every 12 hours    MEDICATIONS  (PRN):  HYDROmorphone  Injectable 0.5 milliGRAM(s) IV Push every 10 minutes PRN Moderate Pain (4 - 6)  morphine  - Injectable 4 milliGRAM(s) IV Push every 4 hours PRN Severe Pain (7 - 10)  ondansetron Injectable 4 milliGRAM(s) IV Push once PRN Nausea and/or Vomiting      Manual Differential (09.06.19 @ 06:23)    Hypochromia: Slight    Anisocytosis: Slight    Red Cell Morphology: Abnormal    Platelet Morphology: Normal    Manual Smear Verification: Performed    Complete Blood Count + Automated Diff (09.06.19 @ 06:23)    WBC Count: 1.89 K/uL    RBC Count: 3.88 M/uL    Hemoglobin: 10.7 g/dL    Hematocrit: 32.7 %    Mean Cell Volume: 84.3 fl    Mean Cell Hemoglobin: 27.6 pg    Mean Cell Hemoglobin Conc: 32.7 gm/dL    Red Cell Distrib Width: 13.1 %    Platelet Count - Automated: 107 K/uL    Auto Neutrophil #: 0.38 K/uL    Auto Lymphocyte #: 1.13 K/uL    Auto Monocyte #: 0.25 K/uL    Auto Eosinophil #: 0.10 K/uL    Auto Basophil #: 0.02 K/uL    Auto Neutrophil %: 20.1: Differential percentages must be correlated with absolute numbers for  clinical significance. %    Auto Lymphocyte %: 59.8 %    Auto Monocyte %: 13.2 %    Auto Eosinophil %: 5.3 %    Auto Basophil %: 1.1 %    Auto Immature Granulocyte %: 0.5 %    Nucleated RBC: 0 /100 WBCs        Basic Metabolic Panel (09.06.19 @ 06:23)    Sodium, Serum: 137 mmol/L    Potassium, Serum: 3.6 mmol/L    Chloride, Serum: 102 mmol/L    Carbon Dioxide, Serum: 30 mmol/L    Anion Gap, Serum: 5 mmol/L    Blood Urea Nitrogen, Serum: 25 mg/dL    Creatinine, Serum: 0.99 mg/dL    Glucose, Serum: 132 mg/dL    Calcium, Total Serum: 8.5 mg/dL    eGFR if Non : 59: Interpretative comment  The units for eGFR are mL/min/1.73M2 (normalized body surface area). The  eGFR is calculated from a serum creatinine using the CKD-EPI equation.  Other variables required for calculation are race, age and sex. Among  patients with chronic kidney disease (CKD), the eGFR is useful in  determining the stage of disease according to KDOQI CKD classification.  All eGFR results are reported numerically with the following  interpretation.          GFR                    With                 Without     (ml/min/1.73 m2)    Kidney Damage       Kidney Damage        >= 90                    Stage 1                     Normal        60-89                    Stage 2                     Decreased GFR        30-59     Stage 3                     Stage 3        15-29                    Stage 4                     Stage 4        < 15                      Stage 5                     Stage 5  Each stage of CKD assumes that the associated GFR level has been in  effect for at least 3 months. Determination of stages one and two (with  eGFR > 59 ml/min/m2) requires estimation of kidney damage for at least 3  months as defined by structural or functional abnormalities.  Limitations: All estimates of GFR will be less accurate for patients at  extremes of muscle mass (including but not limited to frail elderly,  critically ill, or cancer patients), those with unusual diets, and those  with conditions associated with reduced secretion or extrarenal  elimination of creatinine. The eGFR equation is not recommended for use  in patients with unstable creatinine levels. mL/min/1.73M2    eGFR if African American: 69 mL/min/1.73M2

## 2019-09-06 NOTE — CONSULT NOTE ADULT - ASSESSMENT
69y old female with pmh of hypothyroidism, tobacco abuse, chronic back pain, anxiety, alzheimer's dementia coming to hospital for graft to chronic RLE ulcer.   she is now post op debridement and irrigation of RLE wound and placement of vac   reviewed orders she is currently getting Vancomycin 750 mg IV q12 post op antibiotics     plan   can continue with Vancomycin 750 mg iv q12 post op empiric antibiotic for now   check a Vancomycin trough in the AM   wound care as per plastics  continue supportive care as per medicine

## 2019-09-06 NOTE — CONSULT NOTE ADULT - ASSESSMENT
Patient is a 69y old female with pmh of hypothyroidism, tobacco abuse, chronic back pain, anxiety, alzheimer's dementia coming to hospital for graft to chronic RLE ulcer. Patient of DR Gloria with baseline pancytopenia of unclear etiology,  recent bone marrow 1/2019:  Pathology consistent with normal cellular marrow with mild erythroid hyperplasia, with left shift, and immunophenotyping evidence of a small CD5 + clonal B-cell population (0.8%) no evidence of disease pathogenetic alterations detected in the MARISABEL 12-13, BCR-ABL, MPL, or CALR.Patient remains severely neutropenic ( WBC 1.2 K , ).    I agreed with infectious disease recommendations in addition consider prophylactic antibiotics with gram negative coverage since patient's ANC is 380. Can give one dose of neupogen 300 mcg x 1.    Dr aMk will be covering next week.

## 2019-09-06 NOTE — PROGRESS NOTE ADULT - SUBJECTIVE AND OBJECTIVE BOX
Patient is a 69y old  Female who presents with a chief complaint of wound ulcer for graft in am (05 Sep 2019 17:41)    Interval Hx  Patient seen and examined at bedside. No overnight events reported. Pt for OR today for debridement. Reports no new complaints/issues.     ALLERGIES:  Talwin (Unknown)    MEDICATIONS  (STANDING):  ALPRAZolam 3 milliGRAM(s) Oral every 12 hours  docusate sodium 100 milliGRAM(s) Oral three times a day  donepezil 10 milliGRAM(s) Oral at bedtime  hydrochlorothiazide 12.5 milliGRAM(s) Oral daily  influenza   Vaccine 0.5 milliLiter(s) IntraMuscular once  levothyroxine 150 MICROGram(s) Oral daily  memantine 10 milliGRAM(s) Oral two times a day  morphine ER Tablet 60 milliGRAM(s) Oral two times a day  multivitamin 1 Tablet(s) Oral daily  nicotine - 21 mG/24Hr(s) Patch 1 patch Transdermal daily  vancomycin  IVPB 750 milliGRAM(s) IV Intermittent every 12 hours    MEDICATIONS  (PRN):  acetaminophen   Tablet .. 650 milliGRAM(s) Oral every 6 hours PRN Temp greater or equal to 38C (100.4F), Mild Pain (1 - 3), Moderate Pain (4 - 6)  ALBUTerol    90 MICROgram(s) HFA Inhaler 2 Puff(s) Inhalation every 6 hours PRN Shortness of Breath and/or Wheezing  senna 2 Tablet(s) Oral at bedtime PRN Constipation    Vital Signs Last 24 Hrs  T(F): 97.6 (06 Sep 2019 05:41), Max: 98.6 (05 Sep 2019 22:10)  HR: 70 (06 Sep 2019 05:41) (50 - 80)  BP: 103/51 (06 Sep 2019 05:41) (90/50 - 123/76)  RR: 16 (06 Sep 2019 05:41) (14 - 18)  SpO2: 96% (06 Sep 2019 05:41) (96% - 98%)  I&O's Summary    05 Sep 2019 07:01  -  06 Sep 2019 07:00  --------------------------------------------------------  IN: 100 mL / OUT: 0 mL / NET: 100 mL      PHYSICAL EXAM:  General: Frail, Pale, looks cachetic,nad  ENT: MMM, no thrush  Neck: Supple, No JVD  Lungs: Clear to auscultation bilaterally, good air entry, non-labored breathing  Cardio: RRR, S1/S2, No murmur  Abdomen: Soft, Nontender, Nondistended; Bowel sounds present  Extremities: RLE ulcer, wrapped and dressed.     LABS:                        10.7   1.89  )-----------( 107      ( 06 Sep 2019 06:23 )             32.7     09-06    137  |  102  |  25  ----------------------------<  132  3.6   |  30  |  0.99    Ca    8.5      06 Sep 2019 06:23    TPro  7.9  /  Alb  3.3  /  TBili  0.5  /  DBili  x   /  AST  80  /  ALT  77  /  AlkPhos  81  09-05        eGFR if Non African American: 59 mL/min/1.73M2 (09-06-19 @ 06:23)  eGFR if : 69 mL/min/1.73M2 (09-06-19 @ 06:23)    PT/INR - ( 05 Sep 2019 14:50 )   PT: 13.6 sec;   INR: 1.21 ratio         PTT - ( 05 Sep 2019 14:50 )  PTT:35.3 sec    RADIOLOGY & ADDITIONAL TESTS:    Care Discussed with Consultants/Other Providers:

## 2019-09-06 NOTE — CONSULT NOTE ADULT - SUBJECTIVE AND OBJECTIVE BOX
HPI:   Patient is a 69y old female with pmh of hypothyroidism, tobacco abuse, chronic back pain, anxiety, alzheimer's dementia coming to hospital for graft to chronic RLE ulcer. States has been going on for over 1 year. As per chart she is followed by Dr Evans original injury occurred when patient bumped into metal shelving. She has had different therapies and antibiotics  tried in past including but not limited to unaboot, and wound vac. The patient is now post op right LE wound with a placement of a skin graft with vac placement.     REVIEW OF SYSTEMS:  All other review of systems negative (Comprehensive ROS)    PAST MEDICAL & SURGICAL HISTORY:  Smoker  COPD (chronic obstructive pulmonary disease)  Wound  Alzheimer disease  Chronic back pain  Hypothyroid  History of arthroplasty of left hip      Allergies    Talwin (Unknown)    Intolerances            FAMILY HISTORY:  FH: coronary artery disease      SOCIAL HISTORY:  Smoking: current smoker    ETOH:  denies    Drug Use: denies     T(F): 97.3 (09-06-19 @ 14:28), Max: 98.6 (09-05-19 @ 22:10)  HR: 67 (09-06-19 @ 14:28)  BP: 97/56 (09-06-19 @ 14:28)  RR: 17 (09-06-19 @ 14:28)  SpO2: 96% (09-06-19 @ 05:41)  Wt(kg): --    PHYSICAL EXAM:  General: alert, no acute distress  Eyes:  anicteric, no conjunctival injection, no discharge  Oropharynx: no lesions or injection 	  Neck: supple, without adenopathy  Lungs: clear to auscultation  Heart: regular rate and rhythm; no murmur, rubs or gallops  Abdomen: soft, nondistended, nontender, without mass or organomegaly  Skin: no lesions  Extremities: RLE with dressing in place and a wound vac in place   Neurologic: alert, oriented, moves all extremities    LAB RESULTS:                        10.7   1.89  )-----------( 107      ( 06 Sep 2019 06:23 )             32.7     09-06    137  |  102  |  25<H>  ----------------------------<  132<H>  3.6   |  30  |  0.99    Ca    8.5      06 Sep 2019 06:23    TPro  7.9  /  Alb  3.3  /  TBili  0.5  /  DBili  x   /  AST  80<H>  /  ALT  77<H>  /  AlkPhos  81  09-05    LIVER FUNCTIONS - ( 05 Sep 2019 14:50 )  Alb: 3.3 g/dL / Pro: 7.9 g/dL / ALK PHOS: 81 U/L / ALT: 77 U/L / AST: 80 U/L / GGT: x               MICROBIOLOGY:  RECENT CULTURES:        RADIOLOGY REVIEWED:      Antimicrobials Day #    vancomycin  IVPB 750 milliGRAM(s) IV Intermittent every 12 hours    Other Medications:  HYDROmorphone  Injectable 0.5 milliGRAM(s) IV Push every 10 minutes PRN  influenza   Vaccine 0.5 milliLiter(s) IntraMuscular once  lactated ringers. 1000 milliLiter(s) IV Continuous <Continuous>  morphine  - Injectable 4 milliGRAM(s) IV Push every 4 hours PRN  morphine ER Tablet 60 milliGRAM(s) Oral two times a day  ondansetron Injectable 4 milliGRAM(s) IV Push once PRN

## 2019-09-06 NOTE — BRIEF OPERATIVE NOTE - NSICDXBRIEFPREOP_GEN_ALL_CORE_FT
PRE-OP DIAGNOSIS:  Open wound of right lower extremity 06-Sep-2019 13:43:59 with two other small lesions adjacent Elvia Nguyen

## 2019-09-06 NOTE — PROGRESS NOTE ADULT - ASSESSMENT
69 year old female with pmh of hypothyroidism, tobacco abuse, chronic back pain, anxiety, alzheimer's dementia coming to hospital for graft to chronic rle ulcer.     #chronic Right Lower extremity Non healing ulcer  -- For OR today   -- c/w Vancomycin for now  -- vanc trough in AM  -- ID evaluation to guide on duration and course of antibiotics.  -- Plastic surgery following    # Pancytopenia  ? R/o MDS   Pt states following with hematology as outpt but unsure about what was discussed .  Hematology evaluation  trend cbc    #hypothyroidism : stable  - levothyroxine     #copd : stable  c/w nebs    #chronic back pain w/ anxiety   - c/w home meds    # dementia - alzheimers type: stable  - c/w aricept , memantine    #DVT ppx: lovenox ( hold for OR) 69 year old female with pmh of hypothyroidism, tobacco abuse, chronic back pain, anxiety, alzheimer's dementia coming to hospital for graft to chronic rle ulcer.     #chronic Right Lower extremity Non healing ulcer  -- For OR today   -- c/w Vancomycin for now  -- vanc trough in AM  -- ID evaluation to guide on duration and course of antibiotics.  -- check wound culture  -- Plastic surgery following    # Pancytopenia  ? R/o MDS   Pt states following with hematology as outpt but unsure about what was discussed .  Hematology evaluation  trend cbc    #hypothyroidism : stable  - levothyroxine     #copd : stable  c/w nebs    #chronic back pain w/ anxiety   - c/w home meds    # dementia - alzheimers type: stable  - c/w aricept , memantine    #DVT ppx: lovenox ( hold for OR)

## 2019-09-07 LAB
ANION GAP SERPL CALC-SCNC: 8 MMOL/L — SIGNIFICANT CHANGE UP (ref 5–17)
BUN SERPL-MCNC: 18 MG/DL — SIGNIFICANT CHANGE UP (ref 7–23)
CALCIUM SERPL-MCNC: 8.2 MG/DL — LOW (ref 8.4–10.5)
CHLORIDE SERPL-SCNC: 105 MMOL/L — SIGNIFICANT CHANGE UP (ref 96–108)
CO2 SERPL-SCNC: 27 MMOL/L — SIGNIFICANT CHANGE UP (ref 22–31)
CREAT SERPL-MCNC: 0.87 MG/DL — SIGNIFICANT CHANGE UP (ref 0.5–1.3)
GLUCOSE SERPL-MCNC: 93 MG/DL — SIGNIFICANT CHANGE UP (ref 70–99)
HCT VFR BLD CALC: 34.8 % — SIGNIFICANT CHANGE UP (ref 34.5–45)
HGB BLD-MCNC: 11.3 G/DL — LOW (ref 11.5–15.5)
MCHC RBC-ENTMCNC: 28.2 PG — SIGNIFICANT CHANGE UP (ref 27–34)
MCHC RBC-ENTMCNC: 32.5 GM/DL — SIGNIFICANT CHANGE UP (ref 32–36)
MCV RBC AUTO: 86.8 FL — SIGNIFICANT CHANGE UP (ref 80–100)
NRBC # BLD: 0 /100 WBCS — SIGNIFICANT CHANGE UP (ref 0–0)
PLATELET # BLD AUTO: 81 K/UL — LOW (ref 150–400)
POTASSIUM SERPL-MCNC: 4.7 MMOL/L — SIGNIFICANT CHANGE UP (ref 3.5–5.3)
POTASSIUM SERPL-SCNC: 4.7 MMOL/L — SIGNIFICANT CHANGE UP (ref 3.5–5.3)
RBC # BLD: 4.01 M/UL — SIGNIFICANT CHANGE UP (ref 3.8–5.2)
RBC # FLD: 13.1 % — SIGNIFICANT CHANGE UP (ref 10.3–14.5)
SODIUM SERPL-SCNC: 140 MMOL/L — SIGNIFICANT CHANGE UP (ref 135–145)
VANCOMYCIN TROUGH SERPL-MCNC: 29.9 UG/ML — CRITICAL HIGH (ref 10–20)
VANCOMYCIN TROUGH SERPL-MCNC: 30 UG/ML — CRITICAL HIGH (ref 10–20)
WBC # BLD: 3.09 K/UL — LOW (ref 3.8–10.5)
WBC # FLD AUTO: 3.09 K/UL — LOW (ref 3.8–10.5)

## 2019-09-07 PROCEDURE — 99233 SBSQ HOSP IP/OBS HIGH 50: CPT

## 2019-09-07 RX ORDER — SODIUM CHLORIDE 9 MG/ML
1000 INJECTION INTRAMUSCULAR; INTRAVENOUS; SUBCUTANEOUS
Refills: 0 | Status: COMPLETED | OUTPATIENT
Start: 2019-09-07 | End: 2019-09-07

## 2019-09-07 RX ORDER — ALPRAZOLAM 0.25 MG
1 TABLET ORAL
Refills: 0 | Status: DISCONTINUED | OUTPATIENT
Start: 2019-09-07 | End: 2019-09-09

## 2019-09-07 RX ORDER — ENOXAPARIN SODIUM 100 MG/ML
30 INJECTION SUBCUTANEOUS DAILY
Refills: 0 | Status: DISCONTINUED | OUTPATIENT
Start: 2019-09-07 | End: 2019-09-09

## 2019-09-07 RX ORDER — ZINC SULFATE TAB 220 MG (50 MG ZINC EQUIVALENT) 220 (50 ZN) MG
220 TAB ORAL DAILY
Refills: 0 | Status: DISCONTINUED | OUTPATIENT
Start: 2019-09-07 | End: 2019-09-09

## 2019-09-07 RX ORDER — LEVOTHYROXINE SODIUM 125 MCG
150 TABLET ORAL DAILY
Refills: 0 | Status: DISCONTINUED | OUTPATIENT
Start: 2019-09-07 | End: 2019-09-09

## 2019-09-07 RX ORDER — DOCUSATE SODIUM 100 MG
100 CAPSULE ORAL THREE TIMES A DAY
Refills: 0 | Status: DISCONTINUED | OUTPATIENT
Start: 2019-09-07 | End: 2019-09-09

## 2019-09-07 RX ORDER — MEMANTINE HYDROCHLORIDE 10 MG/1
10 TABLET ORAL
Refills: 0 | Status: DISCONTINUED | OUTPATIENT
Start: 2019-09-07 | End: 2019-09-09

## 2019-09-07 RX ORDER — VANCOMYCIN HCL 1 G
500 VIAL (EA) INTRAVENOUS EVERY 12 HOURS
Refills: 0 | Status: DISCONTINUED | OUTPATIENT
Start: 2019-09-07 | End: 2019-09-09

## 2019-09-07 RX ORDER — ALBUTEROL 90 UG/1
2 AEROSOL, METERED ORAL EVERY 6 HOURS
Refills: 0 | Status: DISCONTINUED | OUTPATIENT
Start: 2019-09-07 | End: 2019-09-09

## 2019-09-07 RX ORDER — SENNA PLUS 8.6 MG/1
2 TABLET ORAL AT BEDTIME
Refills: 0 | Status: DISCONTINUED | OUTPATIENT
Start: 2019-09-07 | End: 2019-09-09

## 2019-09-07 RX ORDER — NICOTINE POLACRILEX 2 MG
1 GUM BUCCAL DAILY
Refills: 0 | Status: DISCONTINUED | OUTPATIENT
Start: 2019-09-07 | End: 2019-09-09

## 2019-09-07 RX ORDER — ASCORBIC ACID 60 MG
500 TABLET,CHEWABLE ORAL DAILY
Refills: 0 | Status: DISCONTINUED | OUTPATIENT
Start: 2019-09-07 | End: 2019-09-09

## 2019-09-07 RX ORDER — HYDROMORPHONE HYDROCHLORIDE 2 MG/ML
1 INJECTION INTRAMUSCULAR; INTRAVENOUS; SUBCUTANEOUS
Refills: 0 | Status: DISCONTINUED | OUTPATIENT
Start: 2019-09-07 | End: 2019-09-09

## 2019-09-07 RX ORDER — DONEPEZIL HYDROCHLORIDE 10 MG/1
10 TABLET, FILM COATED ORAL AT BEDTIME
Refills: 0 | Status: DISCONTINUED | OUTPATIENT
Start: 2019-09-07 | End: 2019-09-09

## 2019-09-07 RX ORDER — HYDROCHLOROTHIAZIDE 25 MG
12.5 TABLET ORAL DAILY
Refills: 0 | Status: DISCONTINUED | OUTPATIENT
Start: 2019-09-07 | End: 2019-09-09

## 2019-09-07 RX ADMIN — MORPHINE SULFATE 4 MILLIGRAM(S): 50 CAPSULE, EXTENDED RELEASE ORAL at 02:57

## 2019-09-07 RX ADMIN — HYDROMORPHONE HYDROCHLORIDE 1 MILLIGRAM(S): 2 INJECTION INTRAMUSCULAR; INTRAVENOUS; SUBCUTANEOUS at 11:41

## 2019-09-07 RX ADMIN — MORPHINE SULFATE 60 MILLIGRAM(S): 50 CAPSULE, EXTENDED RELEASE ORAL at 17:21

## 2019-09-07 RX ADMIN — HYDROMORPHONE HYDROCHLORIDE 1 MILLIGRAM(S): 2 INJECTION INTRAMUSCULAR; INTRAVENOUS; SUBCUTANEOUS at 21:44

## 2019-09-07 RX ADMIN — SENNA PLUS 2 TABLET(S): 8.6 TABLET ORAL at 21:35

## 2019-09-07 RX ADMIN — Medication 150 MICROGRAM(S): at 11:20

## 2019-09-07 RX ADMIN — DONEPEZIL HYDROCHLORIDE 10 MILLIGRAM(S): 10 TABLET, FILM COATED ORAL at 21:34

## 2019-09-07 RX ADMIN — Medication 1 MILLIGRAM(S): at 17:20

## 2019-09-07 RX ADMIN — Medication 500 MILLIGRAM(S): at 11:19

## 2019-09-07 RX ADMIN — Medication 250 MILLIGRAM(S): at 06:07

## 2019-09-07 RX ADMIN — MORPHINE SULFATE 60 MILLIGRAM(S): 50 CAPSULE, EXTENDED RELEASE ORAL at 07:00

## 2019-09-07 RX ADMIN — Medication 100 MILLIGRAM(S): at 13:16

## 2019-09-07 RX ADMIN — HYDROMORPHONE HYDROCHLORIDE 1 MILLIGRAM(S): 2 INJECTION INTRAMUSCULAR; INTRAVENOUS; SUBCUTANEOUS at 11:26

## 2019-09-07 RX ADMIN — MORPHINE SULFATE 4 MILLIGRAM(S): 50 CAPSULE, EXTENDED RELEASE ORAL at 02:42

## 2019-09-07 RX ADMIN — SODIUM CHLORIDE 100 MILLILITER(S): 9 INJECTION INTRAMUSCULAR; INTRAVENOUS; SUBCUTANEOUS at 08:20

## 2019-09-07 RX ADMIN — SODIUM CHLORIDE 100 MILLILITER(S): 9 INJECTION INTRAMUSCULAR; INTRAVENOUS; SUBCUTANEOUS at 02:45

## 2019-09-07 RX ADMIN — Medication 100 MILLIGRAM(S): at 21:34

## 2019-09-07 RX ADMIN — PIPERACILLIN AND TAZOBACTAM 25 GRAM(S): 4; .5 INJECTION, POWDER, LYOPHILIZED, FOR SOLUTION INTRAVENOUS at 21:35

## 2019-09-07 RX ADMIN — PIPERACILLIN AND TAZOBACTAM 25 GRAM(S): 4; .5 INJECTION, POWDER, LYOPHILIZED, FOR SOLUTION INTRAVENOUS at 13:16

## 2019-09-07 RX ADMIN — MORPHINE SULFATE 60 MILLIGRAM(S): 50 CAPSULE, EXTENDED RELEASE ORAL at 18:21

## 2019-09-07 RX ADMIN — Medication 12.5 MILLIGRAM(S): at 11:20

## 2019-09-07 RX ADMIN — ZINC SULFATE TAB 220 MG (50 MG ZINC EQUIVALENT) 220 MILLIGRAM(S): 220 (50 ZN) TAB at 11:19

## 2019-09-07 RX ADMIN — ENOXAPARIN SODIUM 30 MILLIGRAM(S): 100 INJECTION SUBCUTANEOUS at 11:19

## 2019-09-07 RX ADMIN — HYDROMORPHONE HYDROCHLORIDE 1 MILLIGRAM(S): 2 INJECTION INTRAMUSCULAR; INTRAVENOUS; SUBCUTANEOUS at 22:00

## 2019-09-07 RX ADMIN — Medication 100 MILLIGRAM(S): at 17:43

## 2019-09-07 RX ADMIN — PIPERACILLIN AND TAZOBACTAM 25 GRAM(S): 4; .5 INJECTION, POWDER, LYOPHILIZED, FOR SOLUTION INTRAVENOUS at 06:04

## 2019-09-07 RX ADMIN — Medication 1 PATCH: at 18:27

## 2019-09-07 RX ADMIN — MORPHINE SULFATE 60 MILLIGRAM(S): 50 CAPSULE, EXTENDED RELEASE ORAL at 06:03

## 2019-09-07 RX ADMIN — Medication 1 TABLET(S): at 11:19

## 2019-09-07 RX ADMIN — Medication 1 PATCH: at 11:20

## 2019-09-07 RX ADMIN — MEMANTINE HYDROCHLORIDE 10 MILLIGRAM(S): 10 TABLET ORAL at 17:20

## 2019-09-07 NOTE — DIETITIAN INITIAL EVALUATION ADULT. - ENERGY NEEDS
Height: 5'4", Weight: (9/6) 105.3lbs, BMI 18.1  Skin: RLE wound + wound vac, Edema: none  IBW range: 108-132lbs, 88% IBW  Last BM: 9/6

## 2019-09-07 NOTE — DIETITIAN INITIAL EVALUATION ADULT. - OTHER INFO
69 year old female with pmh of hypothyroidism, tobacco abuse, chronic back pain, anxiety, alzheimer's dementia coming to hospital for graft to chronic rle ulcer.   Pt tolerated regular diet with report of fair appetite and low desire to eat ever since her  passed 5 years ago- lives alone with her dog. States that her weight dropped 20lbs over the course of the last year. Pt reports financial constraints in relation to food/supplement procurement. States that she really made an effort to eat better (was making smoothies with whey protein, amino acids) + 3 meals/day over the past month and noticed a significant improvement in wound healing. Pt receptive to Merlin & Ensure supplements during hospital course. Would suggest consult with  for food insecurity. Pt reports NKFA, denies chewing/swallowing difficulty. Menu provided and ordering procedures reviewed.

## 2019-09-07 NOTE — PROGRESS NOTE ADULT - SUBJECTIVE AND OBJECTIVE BOX
Post Op Day#: 1    Procedure:  Debridement of wound left leg, STSG, VAC, burn matrix to donor site left thigh      Vital Signs Last 24 Hrs  T(C): 37.2 (07 Sep 2019 11:00), Max: 37.2 (07 Sep 2019 11:00)  T(F): 98.9 (07 Sep 2019 11:00), Max: 98.9 (07 Sep 2019 11:00)  HR: 79 (07 Sep 2019 11:00) (55 - 79)  BP: 120/86 (07 Sep 2019 11:00) (89/61 - 120/86)  BP(mean): 81 (06 Sep 2019 13:58) (81 - 81)  RR: 16 (07 Sep 2019 11:00) (15 - 17)  SpO2: 95% (07 Sep 2019 11:00) (93% - 100%)    I&O's Detail    06 Sep 2019 07:01  -  07 Sep 2019 07:00  --------------------------------------------------------  IN:    lactated ringers.: 75 mL    Oral Fluid: 740 mL    sodium chloride 0.9%: 600 mL    Sodium Chloride 0.9% IV Bolus: 1000 mL    Solution: 500 mL    Solution: 200 mL    Solution: 100 mL  Total IN: 3215 mL    OUT:    Voided: 550 mL  Total OUT: 550 mL    Total NET: 2665 mL      07 Sep 2019 07:01  -  07 Sep 2019 13:50  --------------------------------------------------------  IN:    Oral Fluid: 120 mL    sodium chloride 0.9%: 100 mL    Solution: 100 mL  Total IN: 320 mL    OUT:    Voided: 300 mL  Total OUT: 300 mL    Total NET: 20 mL                                11.3   3.09  )-----------( 81       ( 07 Sep 2019 06:20 )             34.8       09-07    140  |  105  |  18  ----------------------------<  93  4.7   |  27  |  0.87    Ca    8.2<L>      07 Sep 2019 06:20    TPro  7.9  /  Alb  3.3  /  TBili  0.5  /  DBili  x   /  AST  80<H>  /  ALT  77<H>  /  AlkPhos  81  09-05      Physical Exam:    General:  no distress     Extremities:  some limited accumulation of serosang drainage left thigh donor site dressing, no leakage.  VAC intact over skin graft.  no redness right heel from splint

## 2019-09-07 NOTE — PHARMACOTHERAPY INTERVENTION NOTE - COMMENTS
Patient can benefit from 40mg dose. Dr Davidson would like to stay at 30mg due to low platelet count and recent debridement procedure

## 2019-09-07 NOTE — PROGRESS NOTE ADULT - ASSESSMENT
69y old female with pmh of hypothyroidism, tobacco abuse, chronic back pain, anxiety, alzheimer's dementia coming to hospital for graft to chronic RLE ulcer.   she is now post op debridement and irrigation of RLE wound and placement of vac and skin graft   she is currently getting Vancomycin and zosyn post op  No report of purulence .Her w/u reportedly includes a negative MRI and adequate vascular supply  Suggest  1. await tissue culture and pathology  2.Antibiotics empiric.I am not sure they are necessary.  3.No signs of drug toxicity 69y old female with pmh of hypothyroidism, tobacco abuse, chronic back pain, anxiety, alzheimer's dementia coming to hospital for graft to chronic RLE ulcer.   she is now post op debridement and irrigation of RLE wound and placement of vac and skin graft   she is currently getting Vancomycin and zosyn post op  No report of purulence .Her w/u reportedly includes a negative MRI and adequate vascular supply  Suggest  1. await tissue culture and pathology  2.Antibiotics empiric.I am not sure they are necessary.There is some data that bacterial colonization may promote wound healing.  3.No signs of drug toxicity

## 2019-09-07 NOTE — PROGRESS NOTE ADULT - SUBJECTIVE AND OBJECTIVE BOX
CC: f/u for skin grafting to leg    Patient reports: no complaints    REVIEW OF SYSTEMS:  All other review of systems negative (Comprehensive ROS)    Antimicrobials Day #  :POD 1  piperacillin/tazobactam IVPB.. 3.375 Gram(s) IV Intermittent every 8 hours  vancomycin  IVPB 750 milliGRAM(s) IV Intermittent every 12 hours    Other Medications Reviewed    T(F): 98.9 (09-07-19 @ 11:00), Max: 98.9 (09-07-19 @ 11:00)  HR: 79 (09-07-19 @ 11:00)  BP: 120/86 (09-07-19 @ 11:00)  RR: 16 (09-07-19 @ 11:00)  SpO2: 95% (09-07-19 @ 11:00)  Wt(kg): --    PHYSICAL EXAM:  General: alert, no acute distress  Eyes:  anicteric, no conjunctival injection, no discharge  Oropharynx: no lesions or injection 	  Neck: supple, without adenopathy  Lungs: clear to auscultation  Heart: regular rate and rhythm; no murmur, rubs or gallops  Abdomen: soft, nondistended, nontender, without mass or organomegaly  Skin: no lesions  Extremities: no clubbing, cyanosis, or edema  Neurologic: alert, oriented, moves all extremities  Rt leg with wound dressing in place  LAB RESULTS:                        11.3   3.09  )-----------( 81       ( 07 Sep 2019 06:20 )             34.8     09-07    140  |  105  |  18  ----------------------------<  93  4.7   |  27  |  0.87    Ca    8.2<L>      07 Sep 2019 06:20    TPro  7.9  /  Alb  3.3  /  TBili  0.5  /  DBili  x   /  AST  80<H>  /  ALT  77<H>  /  AlkPhos  81  09-05    LIVER FUNCTIONS - ( 05 Sep 2019 14:50 )  Alb: 3.3 g/dL / Pro: 7.9 g/dL / ALK PHOS: 81 U/L / ALT: 77 U/L / AST: 80 U/L / GGT: x             MICROBIOLOGY:  RECENT CULTURES:      RADIOLOGY REVIEWED:  < from: Xray Chest 1 View AP/PA (09.05.19 @ 14:56) >  INTERPRETATION:  Admission.    AP chest. Prior 8/2/2019.    Normal heart mediastinum. Symmetrically hyperinflated lungs. No   consolidation or effusion.    Impression: Hyperinflated lungs. No active infiltrates.

## 2019-09-07 NOTE — PROVIDER CONTACT NOTE (OTHER) - SITUATION
s/p surgery on the right leg and skin graft from the left tight.  hypotention noted. BP 89/61mmhg, HR 62mn.  pt denied any light headness or dizziness.

## 2019-09-07 NOTE — DIETITIAN INITIAL EVALUATION ADULT. - PHYSICAL APPEARANCE
underweight/Pt with evidence of moderate/severe muscle wasting (temporal, clavicle) and subcutaneous fat loss (orbital, buccal)

## 2019-09-07 NOTE — PROGRESS NOTE ADULT - ASSESSMENT
69 year old female with pmh of hypothyroidism, tobacco abuse, chronic back pain, anxiety, alzheimer's dementia coming to hospital for graft to chronic rle ulcer.     #chronic Right Lower extremity Non healing ulcer s/p wound debridement  Pt underwent debridement of wound yesterday with skin graft from Left thigh donor site to right lower extremity with placement of wound vac   -- plastic surgery following  -- c/w iv Vancomycin , zosyn added yesterday due to severe neutropenia  -- Pending vanc trough -- will follow  -- ID on board  -- pending cx  -- Pain management addressed    # Pancytopenia-- Unclear etiology  hem/onc following  s/p 1 dose of neupogen yesterday  pt started on iv vanc/zosyn for severe neutropenia and concern of recent wound ulcer post debridement  trend cbc    #hypothyroidism : stable  - levothyroxine     #copd : stable  c/w nebs    #chronic back pain w/ anxiety   - c/w home meds    # dementia - alzheimers type: stable  - c/w aricept , memantine    #DVT ppx: lovenox s/c

## 2019-09-07 NOTE — DIETITIAN INITIAL EVALUATION ADULT. - ADD RECOMMEND
Suggest MVI, Vitamin C 500mg daily and Zinc sulfate x 9 days. Would suggest social work consult re: food insecurity, lives alone. Obtain and honor food preferences as able. Trend weights, labs & po intake.

## 2019-09-07 NOTE — CHART NOTE - NSCHARTNOTEFT_GEN_A_CORE
Upon Nutritional Assessment by the Registered Dietitian your patient was determined to meet criteria / has evidence of the following diagnosis/diagnoses:          [ ]  Mild Protein Calorie Malnutrition        [X]  Moderate Protein Calorie Malnutrition        [ ] Severe Protein Calorie Malnutrition        [ ] Unspecified Protein Calorie Malnutrition        [X] Underweight / BMI <19        [ ] Morbid Obesity / BMI > 40      Findings as based on:  [X] Comprehensive nutrition assessment   [X] Nutrition Focused Physical Exam: moderate/severe muscle wasting (temporal, clavicle) & subcutaneous fat loss (orbital, buccal)  [X] Other: 17% weight loss within 1 year, meeting <75% EER x 1 year       Nutrition Plan/Recommendations:    1. Add Ensure Enlive 8oz TID + Merlin pckt BID  2. Suggest MVI, Vitamin C 500mg daily, Zinc Sulfate 220mg x 9 days  3. Suggest social work consult re: financial constraints with food/supplement procurement         PROVIDER Section:     By signing this assessment you are acknowledging and agree with the diagnosis/diagnoses assigned by the Registered Dietitian    Comments:

## 2019-09-07 NOTE — PROGRESS NOTE ADULT - SUBJECTIVE AND OBJECTIVE BOX
Patient is a 69y old  Female who presents with a chief complaint of wound ulcer for graft in am (06 Sep 2019 15:20)    Interval Hx  Patient seen and examined at bedside. She states having severe pain at the site of debridement and skin graft removal. Otherwise feels ok.    ALLERGIES:  Talwin (Unknown)    MEDICATIONS  (STANDING):  ALPRAZolam 1 milliGRAM(s) Oral two times a day  docusate sodium 100 milliGRAM(s) Oral three times a day  donepezil 10 milliGRAM(s) Oral at bedtime  enoxaparin Injectable 30 milliGRAM(s) SubCutaneous daily  hydrochlorothiazide 12.5 milliGRAM(s) Oral daily  influenza   Vaccine 0.5 milliLiter(s) IntraMuscular once  levothyroxine 150 MICROGram(s) Oral daily  memantine 10 milliGRAM(s) Oral two times a day  morphine ER Tablet 60 milliGRAM(s) Oral two times a day  nicotine - 21 mG/24Hr(s) Patch 1 patch Transdermal daily  piperacillin/tazobactam IVPB.. 3.375 Gram(s) IV Intermittent every 8 hours  senna 2 Tablet(s) Oral at bedtime  sodium chloride 0.9%. 1000 milliLiter(s) (100 mL/Hr) IV Continuous <Continuous>  vancomycin  IVPB 750 milliGRAM(s) IV Intermittent every 12 hours    MEDICATIONS  (PRN):  ALBUTerol    90 MICROgram(s) HFA Inhaler 2 Puff(s) Inhalation every 6 hours PRN Shortness of Breath  HYDROmorphone  Injectable 1 milliGRAM(s) IV Push four times a day PRN Severe Pain (7 - 10)    Vital Signs Last 24 Hrs  T(F): 98.5 (07 Sep 2019 05:36), Max: 98.5 (07 Sep 2019 05:36)  HR: 71 (07 Sep 2019 05:36) (55 - 83)  BP: 106/74 (07 Sep 2019 05:36) (89/61 - 117/65)  RR: 16 (07 Sep 2019 05:36) (15 - 17)  SpO2: 98% (07 Sep 2019 05:36) (93% - 100%)  I&O's Summary    06 Sep 2019 07:01  -  07 Sep 2019 07:00  --------------------------------------------------------  IN: 3215 mL / OUT: 550 mL / NET: 2665 mL      PHYSICAL EXAM:  General: Frail, in severe pain at site of debridement/graft placement  ENT: MMM, no thrush  Neck: Supple, No JVD  Lungs: Clear to auscultation bilaterally, good air entry, non-labored breathing  Cardio: RRR, S1/S2, No murmur  Abdomen: Soft, Nontender, Nondistended; Bowel sounds present  Extremities: RLE ulcer, wrapped and dressed. donor skin graft dressed at left thigh     LABS:                        11.3   3.09  )-----------( 81       ( 07 Sep 2019 06:20 )             34.8     09-07    140  |  105  |  18  ----------------------------<  93  4.7   |  27  |  0.87    Ca    8.2      07 Sep 2019 06:20    TPro  7.9  /  Alb  3.3  /  TBili  0.5  /  DBili  x   /  AST  80  /  ALT  77  /  AlkPhos  81  09-05      eGFR if Non : 69 mL/min/1.73M2 (09-07-19 @ 06:20)  eGFR if African American: 80 mL/min/1.73M2 (09-07-19 @ 06:20)    PT/INR - ( 05 Sep 2019 14:50 )   PT: 13.6 sec;   INR: 1.21 ratio         PTT - ( 05 Sep 2019 14:50 )  PTT:35.3 sec    RADIOLOGY & ADDITIONAL TESTS:    care Discussed with Consultants/Other Providers:

## 2019-09-07 NOTE — PROGRESS NOTE ADULT - ASSESSMENT
POD 1.  ID consult appreciated.  Nutrition consult appreciated.  Patient to be assessed by PT for crutch walk nonweight bare right leg but she states may be tomorrow she will attempt.   -VAC take down planned for early next week.  Patient has to non weight bare right leg for 3 weeks postop.    -suggest evaluation for AR and or [facility]

## 2019-09-08 LAB
ANION GAP SERPL CALC-SCNC: 5 MMOL/L — SIGNIFICANT CHANGE UP (ref 5–17)
BUN SERPL-MCNC: 13 MG/DL — SIGNIFICANT CHANGE UP (ref 7–23)
CALCIUM SERPL-MCNC: 8.5 MG/DL — SIGNIFICANT CHANGE UP (ref 8.4–10.5)
CHLORIDE SERPL-SCNC: 101 MMOL/L — SIGNIFICANT CHANGE UP (ref 96–108)
CO2 SERPL-SCNC: 31 MMOL/L — SIGNIFICANT CHANGE UP (ref 22–31)
CREAT SERPL-MCNC: 0.82 MG/DL — SIGNIFICANT CHANGE UP (ref 0.5–1.3)
GLUCOSE SERPL-MCNC: 124 MG/DL — HIGH (ref 70–99)
HCT VFR BLD CALC: 33.5 % — LOW (ref 34.5–45)
HGB BLD-MCNC: 11.3 G/DL — LOW (ref 11.5–15.5)
MCHC RBC-ENTMCNC: 27.7 PG — SIGNIFICANT CHANGE UP (ref 27–34)
MCHC RBC-ENTMCNC: 33.7 GM/DL — SIGNIFICANT CHANGE UP (ref 32–36)
MCV RBC AUTO: 82.1 FL — SIGNIFICANT CHANGE UP (ref 80–100)
NRBC # BLD: 0 /100 WBCS — SIGNIFICANT CHANGE UP (ref 0–0)
PLATELET # BLD AUTO: 85 K/UL — LOW (ref 150–400)
POTASSIUM SERPL-MCNC: 3.5 MMOL/L — SIGNIFICANT CHANGE UP (ref 3.5–5.3)
POTASSIUM SERPL-SCNC: 3.5 MMOL/L — SIGNIFICANT CHANGE UP (ref 3.5–5.3)
RBC # BLD: 4.08 M/UL — SIGNIFICANT CHANGE UP (ref 3.8–5.2)
RBC # FLD: 13 % — SIGNIFICANT CHANGE UP (ref 10.3–14.5)
SODIUM SERPL-SCNC: 137 MMOL/L — SIGNIFICANT CHANGE UP (ref 135–145)
WBC # BLD: 2.6 K/UL — LOW (ref 3.8–10.5)
WBC # FLD AUTO: 2.6 K/UL — LOW (ref 3.8–10.5)

## 2019-09-08 PROCEDURE — 99233 SBSQ HOSP IP/OBS HIGH 50: CPT

## 2019-09-08 RX ADMIN — MEMANTINE HYDROCHLORIDE 10 MILLIGRAM(S): 10 TABLET ORAL at 17:44

## 2019-09-08 RX ADMIN — Medication 1 PATCH: at 12:45

## 2019-09-08 RX ADMIN — Medication 100 MILLIGRAM(S): at 17:45

## 2019-09-08 RX ADMIN — HYDROMORPHONE HYDROCHLORIDE 1 MILLIGRAM(S): 2 INJECTION INTRAMUSCULAR; INTRAVENOUS; SUBCUTANEOUS at 18:45

## 2019-09-08 RX ADMIN — Medication 1 MILLIGRAM(S): at 17:44

## 2019-09-08 RX ADMIN — Medication 100 MILLIGRAM(S): at 05:06

## 2019-09-08 RX ADMIN — Medication 12.5 MILLIGRAM(S): at 05:22

## 2019-09-08 RX ADMIN — HYDROMORPHONE HYDROCHLORIDE 1 MILLIGRAM(S): 2 INJECTION INTRAMUSCULAR; INTRAVENOUS; SUBCUTANEOUS at 04:35

## 2019-09-08 RX ADMIN — Medication 1 PATCH: at 21:38

## 2019-09-08 RX ADMIN — PIPERACILLIN AND TAZOBACTAM 25 GRAM(S): 4; .5 INJECTION, POWDER, LYOPHILIZED, FOR SOLUTION INTRAVENOUS at 06:52

## 2019-09-08 RX ADMIN — Medication 100 MILLIGRAM(S): at 05:22

## 2019-09-08 RX ADMIN — Medication 1 PATCH: at 12:52

## 2019-09-08 RX ADMIN — HYDROMORPHONE HYDROCHLORIDE 1 MILLIGRAM(S): 2 INJECTION INTRAMUSCULAR; INTRAVENOUS; SUBCUTANEOUS at 04:02

## 2019-09-08 RX ADMIN — MEMANTINE HYDROCHLORIDE 10 MILLIGRAM(S): 10 TABLET ORAL at 05:22

## 2019-09-08 RX ADMIN — HYDROMORPHONE HYDROCHLORIDE 1 MILLIGRAM(S): 2 INJECTION INTRAMUSCULAR; INTRAVENOUS; SUBCUTANEOUS at 12:41

## 2019-09-08 RX ADMIN — Medication 500 MILLIGRAM(S): at 12:44

## 2019-09-08 RX ADMIN — Medication 100 MILLIGRAM(S): at 21:51

## 2019-09-08 RX ADMIN — PIPERACILLIN AND TAZOBACTAM 25 GRAM(S): 4; .5 INJECTION, POWDER, LYOPHILIZED, FOR SOLUTION INTRAVENOUS at 18:48

## 2019-09-08 RX ADMIN — MORPHINE SULFATE 60 MILLIGRAM(S): 50 CAPSULE, EXTENDED RELEASE ORAL at 05:22

## 2019-09-08 RX ADMIN — MORPHINE SULFATE 60 MILLIGRAM(S): 50 CAPSULE, EXTENDED RELEASE ORAL at 06:00

## 2019-09-08 RX ADMIN — MORPHINE SULFATE 60 MILLIGRAM(S): 50 CAPSULE, EXTENDED RELEASE ORAL at 17:44

## 2019-09-08 RX ADMIN — SENNA PLUS 2 TABLET(S): 8.6 TABLET ORAL at 21:51

## 2019-09-08 RX ADMIN — DONEPEZIL HYDROCHLORIDE 10 MILLIGRAM(S): 10 TABLET, FILM COATED ORAL at 21:51

## 2019-09-08 RX ADMIN — MORPHINE SULFATE 60 MILLIGRAM(S): 50 CAPSULE, EXTENDED RELEASE ORAL at 18:45

## 2019-09-08 RX ADMIN — HYDROMORPHONE HYDROCHLORIDE 1 MILLIGRAM(S): 2 INJECTION INTRAMUSCULAR; INTRAVENOUS; SUBCUTANEOUS at 18:48

## 2019-09-08 RX ADMIN — Medication 1 PATCH: at 06:52

## 2019-09-08 RX ADMIN — ZINC SULFATE TAB 220 MG (50 MG ZINC EQUIVALENT) 220 MILLIGRAM(S): 220 (50 ZN) TAB at 12:44

## 2019-09-08 RX ADMIN — Medication 1 MILLIGRAM(S): at 05:22

## 2019-09-08 RX ADMIN — Medication 150 MICROGRAM(S): at 05:22

## 2019-09-08 RX ADMIN — ENOXAPARIN SODIUM 30 MILLIGRAM(S): 100 INJECTION SUBCUTANEOUS at 12:41

## 2019-09-08 RX ADMIN — Medication 1 TABLET(S): at 12:44

## 2019-09-08 NOTE — PHYSICAL THERAPY INITIAL EVALUATION ADULT - CRITERIA FOR SKILLED THERAPEUTIC INTERVENTIONS
therapy frequency/anticipated discharge recommendation/rehab potential/predicted duration of therapy intervention/impairments found/risk reduction/prevention/functional limitations in following categories

## 2019-09-08 NOTE — PHYSICAL THERAPY INITIAL EVALUATION ADULT - MANUAL MUSCLE TESTING RESULTS, REHAB EVAL
bilateral UE at least 4/5 t/, left LE grossly 4/5 t/o, right hip and knee at least 3/5 t/o, right ankle not tested due to sx/ace/splint/wound VAC

## 2019-09-08 NOTE — PROGRESS NOTE ADULT - ASSESSMENT
69 year old female with pmh of hypothyroidism, tobacco abuse, chronic back pain, anxiety, alzheimer's dementia coming to hospital for graft to chronic rle ulcer.     #chronic Right Lower extremity Non healing ulcer s/p wound debridement  -- plastic surgery following-- Possible vac removal tomorrow. will follow up   -- ID c/s appreciated  -- c/w iv vanc/zosyn emperically for now in view of severe neutropenia and recent debridement. will follow up with ID if abx course is required upon discharge.  Vanc dose adjusted yesterday for high trough . will follow next trough   Pending tissue culture/pathology    # Pancytopenia-- Unclear etiology  s/p 1 dose of neupogen. Hem /onc follow up  c/w empericallu with iv vanc/zosyn for severe neutropenia in the setting of recent wound ulcer post debridement  trend cbc    #hypothyroidism : stable  - levothyroxine     #copd : stable  c/w nebs    #chronic back pain w/ anxiety   - c/w home meds    # dementia - alzheimers type: stable  - c/w aricept , memantine    #DVT ppx: lovenox s/c  Dispo : POSSIBLE [facility] vs acute Rehab

## 2019-09-08 NOTE — PHYSICAL THERAPY INITIAL EVALUATION ADULT - ADDITIONAL COMMENTS
Pt reports she lives alone in a first floor apt.  There are 3 JARED with a handrail that is in need of repair as per patient.  She can enter the building through the garage and then take the elevator up to her first floor apt.  She has a straight cane and a shower bench at home.  She wears glasses.  She does not drive.

## 2019-09-08 NOTE — PHYSICAL THERAPY INITIAL EVALUATION ADULT - GENERAL OBSERVATIONS, REHAB EVAL
Pt encountered sitting up in bed, bed alarm ON, + IV, +Right LE with ACE bandage/splint/wound VAC in place, in NAD, agreeable to PT eval at this time with current pain right LE

## 2019-09-08 NOTE — PHYSICAL THERAPY INITIAL EVALUATION ADULT - PERTINENT HX OF CURRENT PROBLEM, REHAB EVAL
Pt with chronic right LE ulcer x 1 year not responding to previous interventions, initial injury occurred due to bumping into a metal (iron) shelf as per patient

## 2019-09-08 NOTE — PROGRESS NOTE ADULT - SUBJECTIVE AND OBJECTIVE BOX
Patient is a 69y old  Female who presents with a chief complaint of wound ulcer for graft in am (07 Sep 2019 14:43)    Interval Hx  Patient seen and examined at bedside. No overnight events reported. She feels symptomatically better in regards to severe pain that she had yesterday. No new issues.    ALLERGIES:  Talwin (Unknown)    MEDICATIONS  (STANDING):  ALPRAZolam 1 milliGRAM(s) Oral two times a day  ascorbic acid 500 milliGRAM(s) Oral daily  docusate sodium 100 milliGRAM(s) Oral three times a day  donepezil 10 milliGRAM(s) Oral at bedtime  enoxaparin Injectable 30 milliGRAM(s) SubCutaneous daily  hydrochlorothiazide 12.5 milliGRAM(s) Oral daily  influenza   Vaccine 0.5 milliLiter(s) IntraMuscular once  levothyroxine 150 MICROGram(s) Oral daily  memantine 10 milliGRAM(s) Oral two times a day  morphine ER Tablet 60 milliGRAM(s) Oral two times a day  multivitamin 1 Tablet(s) Oral daily  nicotine - 21 mG/24Hr(s) Patch 1 patch Transdermal daily  piperacillin/tazobactam IVPB.. 3.375 Gram(s) IV Intermittent every 8 hours  senna 2 Tablet(s) Oral at bedtime  vancomycin  IVPB 500 milliGRAM(s) IV Intermittent every 12 hours  zinc sulfate 220 milliGRAM(s) Oral daily    MEDICATIONS  (PRN):  ALBUTerol    90 MICROgram(s) HFA Inhaler 2 Puff(s) Inhalation every 6 hours PRN Shortness of Breath  HYDROmorphone  Injectable 1 milliGRAM(s) IV Push four times a day PRN Severe Pain (7 - 10)    Vital Signs Last 24 Hrs  T(F): 98.3 (08 Sep 2019 05:16), Max: 98.9 (07 Sep 2019 11:00)  HR: 72 (08 Sep 2019 05:16) (72 - 79)  BP: 117/70 (08 Sep 2019 05:16) (97/64 - 120/86)  RR: 14 (08 Sep 2019 05:16) (14 - 16)  SpO2: 96% (08 Sep 2019 05:16) (94% - 97%)  I&O's Summary    07 Sep 2019 07:01  -  08 Sep 2019 07:00  --------------------------------------------------------  IN: 760 mL / OUT: 800 mL / NET: -40 mL      PHYSICAL EXAM:  General: NAD, Frail  ENT: MMM, no thrush  Neck: Supple, No JVD  Lungs: Clear to auscultation bilaterally, good air entry, non-labored breathing  Cardio: RRR, S1/S2, No murmur  Abdomen: Soft, Nontender, Nondistended; Bowel sounds present  Extremities:RLE ulcer, wrapped and dressed. donor skin graft dressed at left thigh       LABS:                        11.3   2.60  )-----------( 85       ( 08 Sep 2019 06:00 )             33.5     09-08    137  |  101  |  13  ----------------------------<  124  3.5   |  31  |  0.82    Ca    8.5      08 Sep 2019 06:00    TPro  7.9  /  Alb  3.3  /  TBili  0.5  /  DBili  x   /  AST  80  /  ALT  77  /  AlkPhos  81  09-05        eGFR if Non African American: 74 mL/min/1.73M2 (09-08-19 @ 06:00)  eGFR if African American: 85 mL/min/1.73M2 (09-08-19 @ 06:00)    PT/INR - ( 05 Sep 2019 14:50 )   PT: 13.6 sec;   INR: 1.21 ratio         PTT - ( 05 Sep 2019 14:50 )  PTT:35.3 sec    Culture - Blood (collected 06 Sep 2019 17:00)  Source: .Blood Blood-Peripheral  Preliminary Report (07 Sep 2019 21:01):    No growth to date.      RADIOLOGY & ADDITIONAL TESTS:    Care Discussed with Consultants/Other Providers:

## 2019-09-09 ENCOUNTER — TRANSCRIPTION ENCOUNTER (OUTPATIENT)
Age: 69
End: 2019-09-09

## 2019-09-09 VITALS
SYSTOLIC BLOOD PRESSURE: 141 MMHG | OXYGEN SATURATION: 95 % | RESPIRATION RATE: 14 BRPM | DIASTOLIC BLOOD PRESSURE: 63 MMHG | TEMPERATURE: 98 F | HEART RATE: 60 BPM

## 2019-09-09 DIAGNOSIS — D70.9 NEUTROPENIA, UNSPECIFIED: ICD-10-CM

## 2019-09-09 DIAGNOSIS — D69.6 THROMBOCYTOPENIA, UNSPECIFIED: ICD-10-CM

## 2019-09-09 LAB
HCT VFR BLD CALC: 35.1 % — SIGNIFICANT CHANGE UP (ref 34.5–45)
HGB BLD-MCNC: 11.7 G/DL — SIGNIFICANT CHANGE UP (ref 11.5–15.5)
MCHC RBC-ENTMCNC: 27.3 PG — SIGNIFICANT CHANGE UP (ref 27–34)
MCHC RBC-ENTMCNC: 33.3 GM/DL — SIGNIFICANT CHANGE UP (ref 32–36)
MCV RBC AUTO: 82 FL — SIGNIFICANT CHANGE UP (ref 80–100)
NRBC # BLD: 0 /100 WBCS — SIGNIFICANT CHANGE UP (ref 0–0)
PLATELET # BLD AUTO: 106 K/UL — LOW (ref 150–400)
RBC # BLD: 4.28 M/UL — SIGNIFICANT CHANGE UP (ref 3.8–5.2)
RBC # FLD: 13.2 % — SIGNIFICANT CHANGE UP (ref 10.3–14.5)
VANCOMYCIN TROUGH SERPL-MCNC: 10.9 UG/ML — SIGNIFICANT CHANGE UP (ref 10–20)
WBC # BLD: 2.59 K/UL — LOW (ref 3.8–10.5)
WBC # FLD AUTO: 2.59 K/UL — LOW (ref 3.8–10.5)

## 2019-09-09 PROCEDURE — 99232 SBSQ HOSP IP/OBS MODERATE 35: CPT

## 2019-09-09 PROCEDURE — 99239 HOSP IP/OBS DSCHRG MGMT >30: CPT

## 2019-09-09 RX ORDER — ZINC SULFATE TAB 220 MG (50 MG ZINC EQUIVALENT) 220 (50 ZN) MG
1 TAB ORAL
Qty: 0 | Refills: 0 | DISCHARGE
Start: 2019-09-09

## 2019-09-09 RX ORDER — ASCORBIC ACID 60 MG
1 TABLET,CHEWABLE ORAL
Qty: 0 | Refills: 0 | DISCHARGE
Start: 2019-09-09

## 2019-09-09 RX ORDER — SENNA PLUS 8.6 MG/1
2 TABLET ORAL
Qty: 0 | Refills: 0 | DISCHARGE
Start: 2019-09-09

## 2019-09-09 RX ORDER — DOCUSATE SODIUM 100 MG
1 CAPSULE ORAL
Qty: 0 | Refills: 0 | DISCHARGE
Start: 2019-09-09

## 2019-09-09 RX ADMIN — Medication 100 MILLIGRAM(S): at 06:12

## 2019-09-09 RX ADMIN — Medication 1 PATCH: at 11:00

## 2019-09-09 RX ADMIN — PIPERACILLIN AND TAZOBACTAM 25 GRAM(S): 4; .5 INJECTION, POWDER, LYOPHILIZED, FOR SOLUTION INTRAVENOUS at 13:04

## 2019-09-09 RX ADMIN — Medication 1 PATCH: at 06:51

## 2019-09-09 RX ADMIN — MORPHINE SULFATE 60 MILLIGRAM(S): 50 CAPSULE, EXTENDED RELEASE ORAL at 06:30

## 2019-09-09 RX ADMIN — MEMANTINE HYDROCHLORIDE 10 MILLIGRAM(S): 10 TABLET ORAL at 06:12

## 2019-09-09 RX ADMIN — Medication 1 PATCH: at 11:06

## 2019-09-09 RX ADMIN — HYDROMORPHONE HYDROCHLORIDE 1 MILLIGRAM(S): 2 INJECTION INTRAMUSCULAR; INTRAVENOUS; SUBCUTANEOUS at 00:01

## 2019-09-09 RX ADMIN — Medication 12.5 MILLIGRAM(S): at 06:12

## 2019-09-09 RX ADMIN — Medication 100 MILLIGRAM(S): at 06:15

## 2019-09-09 RX ADMIN — INFLUENZA VIRUS VACCINE 0.5 MILLILITER(S): 15; 15; 15; 15 SUSPENSION INTRAMUSCULAR at 15:10

## 2019-09-09 RX ADMIN — PIPERACILLIN AND TAZOBACTAM 25 GRAM(S): 4; .5 INJECTION, POWDER, LYOPHILIZED, FOR SOLUTION INTRAVENOUS at 06:15

## 2019-09-09 RX ADMIN — Medication 100 MILLIGRAM(S): at 13:05

## 2019-09-09 RX ADMIN — ENOXAPARIN SODIUM 30 MILLIGRAM(S): 100 INJECTION SUBCUTANEOUS at 11:07

## 2019-09-09 RX ADMIN — Medication 150 MICROGRAM(S): at 06:12

## 2019-09-09 RX ADMIN — PIPERACILLIN AND TAZOBACTAM 25 GRAM(S): 4; .5 INJECTION, POWDER, LYOPHILIZED, FOR SOLUTION INTRAVENOUS at 01:20

## 2019-09-09 RX ADMIN — MORPHINE SULFATE 60 MILLIGRAM(S): 50 CAPSULE, EXTENDED RELEASE ORAL at 06:13

## 2019-09-09 RX ADMIN — Medication 1 TABLET(S): at 11:07

## 2019-09-09 RX ADMIN — Medication 1 MILLIGRAM(S): at 06:12

## 2019-09-09 RX ADMIN — ZINC SULFATE TAB 220 MG (50 MG ZINC EQUIVALENT) 220 MILLIGRAM(S): 220 (50 ZN) TAB at 11:07

## 2019-09-09 RX ADMIN — Medication 500 MILLIGRAM(S): at 11:07

## 2019-09-09 RX ADMIN — HYDROMORPHONE HYDROCHLORIDE 1 MILLIGRAM(S): 2 INJECTION INTRAMUSCULAR; INTRAVENOUS; SUBCUTANEOUS at 01:22

## 2019-09-09 NOTE — PROGRESS NOTE ADULT - PROBLEM SELECTOR PLAN 1
Patient with neutropenia-afebrile at present. Wound care per surgery. Holding on growth factor support for now. Continue to monitor CBC with differential.

## 2019-09-09 NOTE — DISCHARGE NOTE PROVIDER - PROVIDER TOKENS
PROVIDER:[TOKEN:[[id number]]],PROVIDER:[TOKEN:[[id number]]] PROVIDER:[TOKEN:[[id number]]],PROVIDER:[TOKEN:[[id number]]],PROVIDER:[TOKEN:[[id number]]]

## 2019-09-09 NOTE — PROGRESS NOTE ADULT - ASSESSMENT
POD 3 STSG right leg, so far healing well, for transfer to [facility]  -xeroform gauze to graft, change every other day  -monitor for drainage donor site  -non-weight bare right leg.

## 2019-09-09 NOTE — DISCHARGE NOTE NURSING/CASE MANAGEMENT/SOCIAL WORK - PATIENT PORTAL LINK FT
You can access the FollowMyHealth Patient Portal offered by [facility] by registering at the following website: [email or web address]. By joining [employer]’s FollowMyHealth portal, you will also be able to view your health information using other applications (apps) compatible with our system.

## 2019-09-09 NOTE — DISCHARGE NOTE PROVIDER - HOSPITAL COURSE
69 female with pmh of hypothyroid, tobacco abuse, chronic back pain, anxiety, alzheimer's dementia, presents to hospital for graft to right lower extremity ulcer - id and plastics consulted pt underwent debridement and irrigation of right lower extremity wound and placement of wound vac and skin graft also received intravenous vanco and zosyn post op.  pt is deemed clinically stable, plan for discharge to [facility] after removal of wound vac. 69 female with pmh of hypothyroid, tobacco abuse, chronic back pain, anxiety, alzheimer's dementia, presents to hospital for graft to right lower extremity ulcer - id and plastics consulted pt underwent debridement and irrigation of right lower extremity wound and placement of wound vac and skin graft also received intravenous vanco and zosyn post op.  pt is deemed clinically stable, plan for discharge to [facility] after removal of wound vac. Pt also 69 female with pmh of hypothyroid, tobacco abuse, chronic back pain, anxiety, alzheimer's dementia, presents to hospital for graft to right lower extremity ulcer - id and plastics consulted pt underwent debridement and irrigation of right lower extremity wound and placement of wound vac and skin graft also received intravenous vanco and zosyn post op.  pt is deemed clinically stable, plan for discharge to [facility] after removal of wound vac by Dr. Evans. 69 female with pmh of hypothyroid, tobacco abuse, chronic back pain, anxiety, alzheimer's dementia, presents to hospital for graft to right lower extremity ulcer - id and plastics consulted pt underwent debridement and irrigation of right lower extremity wound and placement of wound vac and skin graft also received intravenous vanco and zosyn post op.  pt is deemed clinically stable, plan for discharge to [facility] after removal of wound vac by Dr. Evans. Spoke with Dr. Powers who was made aware of hospitalization and discharge plan. 69 female with pmh of hypothyroid, tobacco abuse, chronic back pain, anxiety, alzheimer's dementia, presents to hospital for graft to right lower extremity ulcer - id and plastics consulted pt underwent debridement and irrigation of right lower extremity wound and placement of wound vac and skin graft also received intravenous vanco and zosyn post op.  pt is deemed clinically stable, no signs and symptoms of infection, plan for discharge to [facility] after removal of wound vac by Dr. Evans. Spoke with Dr. Powers who was made aware of hospitalization and discharge plan. Of note , she also has severe neutropenia and pancytopenia. She had 1 dose of neupogen during this hospital stay. Pt will follow up with hem/onc as outpt. She was also seen by ID , who recommended only emperic iv antibiotics .

## 2019-09-09 NOTE — PROGRESS NOTE ADULT - ASSESSMENT
69-year-old female with history of dementia, who presented to the hospital with a chronic right lower extremity ulcer, for graft. She has a history of neutropenia and thrombocytopenia and has been under the hematologic care of Dr. Gloria. Bone marrow evaluation 1/2019 showed a normocellular marrow with mild erythroid hyperplasia, left shift, and a small, CD5 positive clonal B-cell population. Patient was followed expectantly.

## 2019-09-09 NOTE — PROGRESS NOTE ADULT - SUBJECTIVE AND OBJECTIVE BOX
( x ) No complaints (  ) Complains of:   states she was not able to crutch walk.  for transfer to [facility]  Pt seen earlier before discharge    T(F): 98.4 (09-09-19 @ 15:45), Max: 98.4 (09-09-19 @ 15:45)  HR: 60 (09-09-19 @ 15:45) (60 - 76)  BP: 141/63 (09-09-19 @ 15:45) (114/62 - 141/63)  RR: 14 (09-09-19 @ 15:45) (14 - 14)  SpO2: 95% (09-09-19 @ 15:45) (95% - 98%)        Wound Location 1:  donor site dressing in place, postop serosanguinous fluid has drained       Wound location 2:  VAC removed skin graft right leg, graft pink, appears to be taking                                       11.7   2.59  )-----------( 106      ( 09 Sep 2019 05:20 )             35.1     CBC Full  -  ( 09 Sep 2019 05:20 )  WBC Count : 2.59 K/uL  RBC Count : 4.28 M/uL  Hemoglobin : 11.7 g/dL  Hematocrit : 35.1 %  Platelet Count - Automated : 106 K/uL  Mean Cell Volume : 82.0 fl  Mean Cell Hemoglobin : 27.3 pg  Mean Cell Hemoglobin Concentration : 33.3 gm/dL  Auto Neutrophil # : x  Auto Lymphocyte # : x  Auto Monocyte # : x  Auto Eosinophil # : x  Auto Basophil # : x  Auto Neutrophil % : x  Auto Lymphocyte % : x  Auto Monocyte % : x  Auto Eosinophil % : x  Auto Basophil % : x                    Procedure Performed:  (  )Yes     ( x )No  Name of Procedure:  (  )debridement    (  )I&D    (  )Other:  (  )partial thickness     (  )full thickness     (  )subcutaneous     (  )muscle/tendon     (  )bone  (  )sharp     (  )surgical

## 2019-09-09 NOTE — DISCHARGE NOTE PROVIDER - CARE PROVIDERS DIRECT ADDRESSES
,[email or web address],[email or web address] [email or web address],DirectAddress_Unknown,[email or web address]

## 2019-09-09 NOTE — PROGRESS NOTE ADULT - SUBJECTIVE AND OBJECTIVE BOX
Patient is a 69y old  Female who presents with a chief complaint of wound ulcer for graft in am (09 Sep 2019 12:07)    Interval Hx  Patient seen and examined at bedside. No overnight events reported.     ALLERGIES:  Talwin (Unknown)    MEDICATIONS  (STANDING):  ALPRAZolam 1 milliGRAM(s) Oral two times a day  ascorbic acid 500 milliGRAM(s) Oral daily  docusate sodium 100 milliGRAM(s) Oral three times a day  donepezil 10 milliGRAM(s) Oral at bedtime  enoxaparin Injectable 30 milliGRAM(s) SubCutaneous daily  hydrochlorothiazide 12.5 milliGRAM(s) Oral daily  influenza   Vaccine 0.5 milliLiter(s) IntraMuscular once  levothyroxine 150 MICROGram(s) Oral daily  memantine 10 milliGRAM(s) Oral two times a day  morphine ER Tablet 60 milliGRAM(s) Oral two times a day  multivitamin 1 Tablet(s) Oral daily  nicotine - 21 mG/24Hr(s) Patch 1 patch Transdermal daily  piperacillin/tazobactam IVPB.. 3.375 Gram(s) IV Intermittent every 8 hours  senna 2 Tablet(s) Oral at bedtime  vancomycin  IVPB 500 milliGRAM(s) IV Intermittent every 12 hours  zinc sulfate 220 milliGRAM(s) Oral daily    MEDICATIONS  (PRN):  ALBUTerol    90 MICROgram(s) HFA Inhaler 2 Puff(s) Inhalation every 6 hours PRN Shortness of Breath  HYDROmorphone  Injectable 1 milliGRAM(s) IV Push four times a day PRN Severe Pain (7 - 10)    Vital Signs Last 24 Hrs  T(F): 98 (09 Sep 2019 05:55), Max: 98 (08 Sep 2019 15:46)  HR: 64 (09 Sep 2019 05:55) (64 - 76)  BP: 115/82 (09 Sep 2019 05:55) (114/62 - 142/72)  RR: 14 (09 Sep 2019 05:55) (14 - 15)  SpO2: 98% (09 Sep 2019 05:55) (97% - 98%)  I&O's Summary    08 Sep 2019 07:01  -  09 Sep 2019 07:00  --------------------------------------------------------  IN: 200 mL / OUT: 100 mL / NET: 100 mL    09 Sep 2019 07:01  -  09 Sep 2019 13:54  --------------------------------------------------------  IN: 120 mL / OUT: 0 mL / NET: 120 mL      PHYSICAL EXAM:  General: NAD, A/O x 3  ENT: MMM, no thrush  Neck: Supple, No JVD  Lungs: Clear to auscultation bilaterally, good air entry, non-labored breathing  Cardio: RRR, S1/S2, No murmur  Abdomen: Soft, Nontender, Nondistended; Bowel sounds present  Extremities: No calf tenderness, No pitting edema    LABS:                        11.7   2.59  )-----------( 106      ( 09 Sep 2019 05:20 )             35.1     09-08    137  |  101  |  13  ----------------------------<  124  3.5   |  31  |  0.82    Ca    8.5      08 Sep 2019 06:00          eGFR if Non African American: 74 mL/min/1.73M2 (09-08-19 @ 06:00)  eGFR if African American: 85 mL/min/1.73M2 (09-08-19 @ 06:00)                                    Culture - Blood (collected 06 Sep 2019 17:00)  Source: .Blood Blood-Peripheral  Preliminary Report (07 Sep 2019 21:01):    No growth to date.      RADIOLOGY & ADDITIONAL TESTS:    Care Discussed with Consultants/Other Providers:

## 2019-09-09 NOTE — PROGRESS NOTE ADULT - ASSESSMENT
69 year old female with pmh of hypothyroidism, tobacco abuse, chronic back pain, anxiety, alzheimer's dementia coming to hospital for graft to chronic rle ulcer.     #chronic Right Lower extremity Non healing ulcer s/p wound debridement  -- wound vac removed today  -- ID c/s appreciated: d/c antibiotics   -- Pending tissue culture/pathology  -- Dressings as recommended by plastic surgery    # Pancytopenia-- Unclear etiology  s/p 1 dose of neupogen. Hem /onc follow up  need outpt hem/onc follow up    #hypothyroidism : stable  - levothyroxine     #copd : stable  c/w nebs    #chronic back pain w/ anxiety   - c/w home meds    # dementia - alzheimers type: stable  - c/w aricept , memantine    #DVT ppx: lovenox s/c  Dispo : Discharge to [facility] today

## 2019-09-09 NOTE — PROGRESS NOTE ADULT - REASON FOR ADMISSION
wound ulcer for graft in am

## 2019-09-09 NOTE — DISCHARGE NOTE PROVIDER - NSDCCPCAREPLAN_GEN_ALL_CORE_FT
PRINCIPAL DISCHARGE DIAGNOSIS  Diagnosis: Ulcer of right lower extremity, unspecified ulcer stage  Assessment and Plan of Treatment: PRINCIPAL DISCHARGE DIAGNOSIS  Diagnosis: Ulcer of right lower extremity, unspecified ulcer stage  Assessment and Plan of Treatment: s/p debridement with wound vac. wound vac has been removed. Pt needs local wound care and needs to follow up with plastic surgery as outpt.      SECONDARY DISCHARGE DIAGNOSES  Diagnosis: Pancytopenia  Assessment and Plan of Treatment: Hem/onc outpt follow up

## 2019-09-09 NOTE — PROGRESS NOTE ADULT - SUBJECTIVE AND OBJECTIVE BOX
ORLANDO BOY   69y   Female    Admitting: ANGEL Tran  HPI:  69 year old female with pmh of hypothyroidism, tobacco abuse, chronic back pain, anxiety, alzheimer's dementia coming to hospital for graft to chronic rle ulcer. States has been going on for over 1 year. follows with Dr Evans. original injury occured when patient bumped into metal shelving. states has had multiple different therapies and abx tried in past including but not limited to unaboot, and wound vac. states no cp sob nausea vomiting diarrhea or fever. states is painful to touch when touch injury. states follows wound care instructions per Dr Evans however is current smoker. (05 Sep 2019 17:41)    PAST MEDICAL & SURGICAL HISTORY:  Smoker  COPD (chronic obstructive pulmonary disease)  Wound  Alzheimer disease  Chronic back pain  Hypothyroid  History of arthroplasty of left hip    HEALTH ISSUES - PROBLEM Dx:        MEDICATIONS  (STANDING):  ALPRAZolam 1 milliGRAM(s) Oral two times a day  ascorbic acid 500 milliGRAM(s) Oral daily  docusate sodium 100 milliGRAM(s) Oral three times a day  donepezil 10 milliGRAM(s) Oral at bedtime  enoxaparin Injectable 30 milliGRAM(s) SubCutaneous daily  hydrochlorothiazide 12.5 milliGRAM(s) Oral daily  influenza   Vaccine 0.5 milliLiter(s) IntraMuscular once  levothyroxine 150 MICROGram(s) Oral daily  memantine 10 milliGRAM(s) Oral two times a day  morphine ER Tablet 60 milliGRAM(s) Oral two times a day  multivitamin 1 Tablet(s) Oral daily  nicotine - 21 mG/24Hr(s) Patch 1 patch Transdermal daily  piperacillin/tazobactam IVPB.. 3.375 Gram(s) IV Intermittent every 8 hours  senna 2 Tablet(s) Oral at bedtime  vancomycin  IVPB 500 milliGRAM(s) IV Intermittent every 12 hours  zinc sulfate 220 milliGRAM(s) Oral daily    MEDICATIONS  (PRN):  ALBUTerol    90 MICROgram(s) HFA Inhaler 2 Puff(s) Inhalation every 6 hours PRN Shortness of Breath  HYDROmorphone  Injectable 1 milliGRAM(s) IV Push four times a day PRN Severe Pain (7 - 10)    Allergies    Talwin (Unknown)    Intolerances        INTERVAL HPI/OVERNIGHT EVENTS:  Patient S&E at bedside. No o/n events,     VITAL SIGNS:  T(F): 98 (09-09-19 @ 05:55)  HR: 64 (09-09-19 @ 05:55)  BP: 115/82 (09-09-19 @ 05:55)  RR: 14 (09-09-19 @ 05:55)  SpO2: 98% (09-09-19 @ 05:55)  Wt(kg): --    PHYSICAL EXAM:  Constitutional: NAD  Eyes: sclera non-icteric  Neck: no JVD  Respiratory: CTA b/l, good air entry b/l ant.  Cardiovascular: RRR, no M/R/G  Gastrointestinal: soft, NTND, no masses palpable, no hepatosplenomegaly  Extremities: no calf tenderness  Neurological: Awake, alert.    Labs:             11.7   2.59  )-----------( 106      ( 09-09 @ 05:20 )             35.1                11.3   2.60  )-----------( 85       ( 09-08 @ 06:00 )             33.5                11.3   3.09  )-----------( 81       ( 09-07 @ 06:20 )             34.8       09-08    137  |  101  |  13  ----------------------------<  124<H>  3.5   |  31  |  0.82    Ca    8.5      08 Sep 2019 06:00                                    Culture - Blood (collected 06 Sep 2019 17:00)  Source: .Blood Blood-Peripheral  Preliminary Report (07 Sep 2019 21:01):    No growth to date.        RADIOLOGY & ADDITIONAL TESTS:  Consultant notes reviewed : YES [ ] ; NO [ ] ORLANDO BRUNSON   69y   Female    Admitting: ANGEL Tran  HPI:  69-year-old female with history of dementia, who presented to the hospital with a chronic right lower extremity ulcer, for graft. She has a history of neutropenia and thrombocytopenia and has been under the hematologic care of Dr. Gloria. Bone marrow evaluation 1/2019 showed a normocellular marrow with mild erythroid hyperplasia, left shift, and a small, CD5 positive clonal B-cell population. Patient was followed expectantly.    PAST MEDICAL & SURGICAL HISTORY:  Smoker  COPD (chronic obstructive pulmonary disease)  Wound  Alzheimer disease  Chronic back pain  Hypothyroid  History of arthroplasty of left hip    MEDICATIONS  (STANDING):  ALPRAZolam 1 milliGRAM(s) Oral two times a day  ascorbic acid 500 milliGRAM(s) Oral daily  docusate sodium 100 milliGRAM(s) Oral three times a day  donepezil 10 milliGRAM(s) Oral at bedtime  enoxaparin Injectable 30 milliGRAM(s) SubCutaneous daily  hydrochlorothiazide 12.5 milliGRAM(s) Oral daily  influenza   Vaccine 0.5 milliLiter(s) IntraMuscular once  levothyroxine 150 MICROGram(s) Oral daily  memantine 10 milliGRAM(s) Oral two times a day  morphine ER Tablet 60 milliGRAM(s) Oral two times a day  multivitamin 1 Tablet(s) Oral daily  nicotine - 21 mG/24Hr(s) Patch 1 patch Transdermal daily  piperacillin/tazobactam IVPB.. 3.375 Gram(s) IV Intermittent every 8 hours  senna 2 Tablet(s) Oral at bedtime  vancomycin  IVPB 500 milliGRAM(s) IV Intermittent every 12 hours  zinc sulfate 220 milliGRAM(s) Oral daily    MEDICATIONS  (PRN):  ALBUTerol    90 MICROgram(s) HFA Inhaler 2 Puff(s) Inhalation every 6 hours PRN Shortness of Breath  HYDROmorphone  Injectable 1 milliGRAM(s) IV Push four times a day PRN Severe Pain (7 - 10)    Allergies    Talwin (Unknown)    INTERVAL HPI/OVERNIGHT EVENTS:  Patient S&E at bedside. No c/o CP or SOB.     VITAL SIGNS:  T(F): 98 (09-09-19 @ 05:55)  HR: 64 (09-09-19 @ 05:55)  BP: 115/82 (09-09-19 @ 05:55)  RR: 14 (09-09-19 @ 05:55)  SpO2: 98% (09-09-19 @ 05:55)    PHYSICAL EXAM:  Constitutional: NAD  Eyes: sclera non-icteric  Neck: no JVD  Respiratory: CTA ant.  Cardiovascular: RRR, no M/R/G  Gastrointestinal: soft, NTND, no masses palpable, no hepatosplenomegaly appreciated  Extremities: no calf tenderness elicited  Neurological: Awake, alert.    Labs:             11.7   2.59  )-----------( 106      ( 09-09 @ 05:20 )             35.1                11.3   2.60  )-----------( 85       ( 09-08 @ 06:00 )             33.5                11.3   3.09  )-----------( 81       ( 09-07 @ 06:20 )             34.8       09-08    137  |  101  |  13  ----------------------------<  124<H>  3.5   |  31  |  0.82    Ca    8.5      08 Sep 2019 06:00    Culture - Blood (collected 06 Sep 2019 17:00)  Source: .Blood Blood-Peripheral  Preliminary Report (07 Sep 2019 21:01):    No growth to date.

## 2019-09-09 NOTE — DISCHARGE NOTE PROVIDER - CARE PROVIDER_API CALL
Raffy Powers)  Family Medicine; Geriatric Medicine  [address] [place] [address]  Phone: [phone]  Fax: [phone]  Follow Up Time:     Amara Evans)  Plastic Surgery  [address] [place] [address], [address]  [place] [address]  Phone: [phone]  Fax: [phone]  Follow Up Time: Raffy Powers)  Family Medicine; Geriatric Medicine  [address] [place] [address]  Phone: [phone]  Fax: [phone]  Follow Up Time:     Amara Evans)  Plastic Surgery  [address] [place] [address], [address]  [place] [address]  Phone: [phone]  Fax: [phone]  Follow Up Time:     Ave Blancas)  Internal Medicine; Medical Oncology  [address] [facility], [place] [address]  Phone: [phone]  Fax: [phone]  Follow Up Time:

## 2019-09-09 NOTE — DISCHARGE NOTE PROVIDER - NSDCFUADDINST_GEN_ALL_CORE_FT
right lower extremity wound care:  gently remove xeroform dressing, clean with normal saline, pat dry, apply new xeroform gauze, cover with four inch gauze ABD pad and santo replace splint remove staples.  frequency: every other day

## 2019-09-09 NOTE — PROGRESS NOTE ADULT - PROVIDER SPECIALTY LIST ADULT
Heme/Onc
Hospitalist
Infectious Disease
Plastic Surgery
Hospitalist
Plastic Surgery

## 2019-09-10 ENCOUNTER — INBOUND DOCUMENT (OUTPATIENT)
Age: 69
End: 2019-09-10

## 2019-09-11 LAB
CULTURE RESULTS: SIGNIFICANT CHANGE UP
SPECIMEN SOURCE: SIGNIFICANT CHANGE UP

## 2019-09-17 LAB — SURGICAL PATHOLOGY STUDY: SIGNIFICANT CHANGE UP

## 2019-09-18 ENCOUNTER — INBOUND DOCUMENT (OUTPATIENT)
Age: 69
End: 2019-09-18

## 2019-09-19 LAB — SURGICAL PATHOLOGY STUDY: SIGNIFICANT CHANGE UP

## 2019-10-01 PROCEDURE — 88350 IMFLUOR EA ADDL 1ANTB STN PX: CPT

## 2019-10-01 PROCEDURE — 80053 COMPREHEN METABOLIC PANEL: CPT

## 2019-10-01 PROCEDURE — 86901 BLOOD TYPING SEROLOGIC RH(D): CPT

## 2019-10-01 PROCEDURE — 97161 PT EVAL LOW COMPLEX 20 MIN: CPT

## 2019-10-01 PROCEDURE — 88305 TISSUE EXAM BY PATHOLOGIST: CPT

## 2019-10-01 PROCEDURE — 87040 BLOOD CULTURE FOR BACTERIA: CPT

## 2019-10-01 PROCEDURE — 99285 EMERGENCY DEPT VISIT HI MDM: CPT | Mod: 25

## 2019-10-01 PROCEDURE — 99261: CPT

## 2019-10-01 PROCEDURE — 88346 IMFLUOR 1ST 1ANTB STAIN PX: CPT

## 2019-10-01 PROCEDURE — 80048 BASIC METABOLIC PNL TOTAL CA: CPT

## 2019-10-01 PROCEDURE — 86900 BLOOD TYPING SEROLOGIC ABO: CPT

## 2019-10-01 PROCEDURE — 71045 X-RAY EXAM CHEST 1 VIEW: CPT

## 2019-10-01 PROCEDURE — 86850 RBC ANTIBODY SCREEN: CPT

## 2019-10-01 PROCEDURE — 85730 THROMBOPLASTIN TIME PARTIAL: CPT

## 2019-10-01 PROCEDURE — 90686 IIV4 VACC NO PRSV 0.5 ML IM: CPT

## 2019-10-01 PROCEDURE — 36415 COLL VENOUS BLD VENIPUNCTURE: CPT

## 2019-10-01 PROCEDURE — 93005 ELECTROCARDIOGRAM TRACING: CPT

## 2019-10-01 PROCEDURE — 36000 PLACE NEEDLE IN VEIN: CPT

## 2019-10-01 PROCEDURE — 80202 ASSAY OF VANCOMYCIN: CPT

## 2019-10-01 PROCEDURE — 85027 COMPLETE CBC AUTOMATED: CPT

## 2019-10-01 PROCEDURE — 85610 PROTHROMBIN TIME: CPT

## 2019-10-02 PROBLEM — Z60.2 PERSON LIVING ALONE: Status: ACTIVE | Noted: 2018-07-20

## 2019-10-30 PROBLEM — J44.9 CHRONIC OBSTRUCTIVE PULMONARY DISEASE, UNSPECIFIED: Chronic | Status: ACTIVE | Noted: 2019-09-05

## 2019-10-30 PROBLEM — F17.200 NICOTINE DEPENDENCE, UNSPECIFIED, UNCOMPLICATED: Chronic | Status: ACTIVE | Noted: 2019-09-05

## 2019-11-01 ENCOUNTER — APPOINTMENT (OUTPATIENT)
Dept: CT IMAGING | Facility: HOSPITAL | Age: 69
End: 2019-11-01
Payer: MEDICARE

## 2019-11-01 ENCOUNTER — OUTPATIENT (OUTPATIENT)
Dept: OUTPATIENT SERVICES | Facility: HOSPITAL | Age: 69
LOS: 1 days | End: 2019-11-01
Payer: MEDICARE

## 2019-11-01 DIAGNOSIS — Z98.890 OTHER SPECIFIED POSTPROCEDURAL STATES: Chronic | ICD-10-CM

## 2019-11-01 DIAGNOSIS — R91.8 OTHER NONSPECIFIC ABNORMAL FINDING OF LUNG FIELD: ICD-10-CM

## 2019-11-01 DIAGNOSIS — R05 COUGH: ICD-10-CM

## 2019-11-01 PROCEDURE — 71250 CT THORAX DX C-: CPT | Mod: 26

## 2019-11-01 PROCEDURE — 71250 CT THORAX DX C-: CPT

## 2019-11-05 ENCOUNTER — INPATIENT (INPATIENT)
Facility: HOSPITAL | Age: 69
LOS: 3 days | Discharge: NURSING, REHAB OR LONG-TERM CARE | DRG: 871 | End: 2019-11-09
Attending: HOSPITALIST | Admitting: INTERNAL MEDICINE
Payer: MEDICARE

## 2019-11-05 VITALS
TEMPERATURE: 105 F | OXYGEN SATURATION: 88 % | HEIGHT: 62 IN | SYSTOLIC BLOOD PRESSURE: 108 MMHG | RESPIRATION RATE: 30 BRPM | DIASTOLIC BLOOD PRESSURE: 68 MMHG | WEIGHT: 95.02 LBS | HEART RATE: 127 BPM

## 2019-11-05 DIAGNOSIS — A41.9 SEPSIS, UNSPECIFIED ORGANISM: ICD-10-CM

## 2019-11-05 DIAGNOSIS — Z98.890 OTHER SPECIFIED POSTPROCEDURAL STATES: Chronic | ICD-10-CM

## 2019-11-05 LAB
ALBUMIN SERPL ELPH-MCNC: 2.9 G/DL — LOW (ref 3.3–5)
ALP SERPL-CCNC: 112 U/L — SIGNIFICANT CHANGE UP (ref 40–120)
ALT FLD-CCNC: 42 U/L — SIGNIFICANT CHANGE UP (ref 10–45)
ANION GAP SERPL CALC-SCNC: 8 MMOL/L — SIGNIFICANT CHANGE UP (ref 5–17)
APPEARANCE UR: CLEAR — SIGNIFICANT CHANGE UP
APTT BLD: 32.9 SEC — SIGNIFICANT CHANGE UP (ref 27.5–36.3)
AST SERPL-CCNC: 48 U/L — HIGH (ref 10–40)
BACTERIA # UR AUTO: ABNORMAL /HPF
BASOPHILS # BLD AUTO: 0.01 K/UL — SIGNIFICANT CHANGE UP (ref 0–0.2)
BASOPHILS NFR BLD AUTO: 0.2 % — SIGNIFICANT CHANGE UP (ref 0–2)
BILIRUB SERPL-MCNC: 0.5 MG/DL — SIGNIFICANT CHANGE UP (ref 0.2–1.2)
BILIRUB UR-MCNC: NEGATIVE — SIGNIFICANT CHANGE UP
BLOOD GAS COMMENTS ARTERIAL: SIGNIFICANT CHANGE UP
BUN SERPL-MCNC: 18 MG/DL — SIGNIFICANT CHANGE UP (ref 7–23)
CALCIUM SERPL-MCNC: 8.3 MG/DL — LOW (ref 8.4–10.5)
CHLORIDE SERPL-SCNC: 101 MMOL/L — SIGNIFICANT CHANGE UP (ref 96–108)
CO2 BLDA-SCNC: 24 MMOL/L — SIGNIFICANT CHANGE UP (ref 22–30)
CO2 SERPL-SCNC: 28 MMOL/L — SIGNIFICANT CHANGE UP (ref 22–31)
COLOR SPEC: YELLOW — SIGNIFICANT CHANGE UP
CREAT SERPL-MCNC: 0.81 MG/DL — SIGNIFICANT CHANGE UP (ref 0.5–1.3)
DIFF PNL FLD: ABNORMAL
EOSINOPHIL # BLD AUTO: 0.09 K/UL — SIGNIFICANT CHANGE UP (ref 0–0.5)
EOSINOPHIL NFR BLD AUTO: 1.8 % — SIGNIFICANT CHANGE UP (ref 0–6)
EPI CELLS # UR: SIGNIFICANT CHANGE UP
GAS PNL BLDA: SIGNIFICANT CHANGE UP
GLUCOSE BLDC GLUCOMTR-MCNC: 156 MG/DL — HIGH (ref 70–99)
GLUCOSE BLDC GLUCOMTR-MCNC: 215 MG/DL — HIGH (ref 70–99)
GLUCOSE SERPL-MCNC: 129 MG/DL — HIGH (ref 70–99)
GLUCOSE UR QL: NEGATIVE — SIGNIFICANT CHANGE UP
HCT VFR BLD CALC: 37.9 % — SIGNIFICANT CHANGE UP (ref 34.5–45)
HGB BLD-MCNC: 12.2 G/DL — SIGNIFICANT CHANGE UP (ref 11.5–15.5)
HOROWITZ INDEX BLDA+IHG-RTO: SIGNIFICANT CHANGE UP
IMM GRANULOCYTES NFR BLD AUTO: 0.2 % — SIGNIFICANT CHANGE UP (ref 0–1.5)
INR BLD: 1.18 RATIO — HIGH (ref 0.88–1.16)
KETONES UR-MCNC: NEGATIVE — SIGNIFICANT CHANGE UP
LACTATE SERPL-SCNC: 1.7 MMOL/L — SIGNIFICANT CHANGE UP (ref 0.7–2)
LACTATE SERPL-SCNC: 2.5 MMOL/L — HIGH (ref 0.7–2)
LEUKOCYTE ESTERASE UR-ACNC: ABNORMAL
LYMPHOCYTES # BLD AUTO: 0.84 K/UL — LOW (ref 1–3.3)
LYMPHOCYTES # BLD AUTO: 17.2 % — SIGNIFICANT CHANGE UP (ref 13–44)
MCHC RBC-ENTMCNC: 27.4 PG — SIGNIFICANT CHANGE UP (ref 27–34)
MCHC RBC-ENTMCNC: 32.2 GM/DL — SIGNIFICANT CHANGE UP (ref 32–36)
MCV RBC AUTO: 85.2 FL — SIGNIFICANT CHANGE UP (ref 80–100)
MONOCYTES # BLD AUTO: 0.33 K/UL — SIGNIFICANT CHANGE UP (ref 0–0.9)
MONOCYTES NFR BLD AUTO: 6.8 % — SIGNIFICANT CHANGE UP (ref 2–14)
NEUTROPHILS # BLD AUTO: 3.6 K/UL — SIGNIFICANT CHANGE UP (ref 1.8–7.4)
NEUTROPHILS NFR BLD AUTO: 73.8 % — SIGNIFICANT CHANGE UP (ref 43–77)
NITRITE UR-MCNC: POSITIVE
NRBC # BLD: 0 /100 WBCS — SIGNIFICANT CHANGE UP (ref 0–0)
PCO2 BLDA: 40 MMHG — SIGNIFICANT CHANGE UP (ref 32–46)
PH BLDA: 7.38 — SIGNIFICANT CHANGE UP (ref 7.35–7.45)
PH UR: 5 — SIGNIFICANT CHANGE UP (ref 5–8)
PLATELET # BLD AUTO: 145 K/UL — LOW (ref 150–400)
PO2 BLDA: 459 MMHG — HIGH (ref 74–108)
POTASSIUM SERPL-MCNC: 4.2 MMOL/L — SIGNIFICANT CHANGE UP (ref 3.5–5.3)
POTASSIUM SERPL-SCNC: 4.2 MMOL/L — SIGNIFICANT CHANGE UP (ref 3.5–5.3)
PROT SERPL-MCNC: 7.4 G/DL — SIGNIFICANT CHANGE UP (ref 6–8.3)
PROT UR-MCNC: NEGATIVE — SIGNIFICANT CHANGE UP
PROTHROM AB SERPL-ACNC: 13.3 SEC — HIGH (ref 10–12.9)
RAPID RVP RESULT: SIGNIFICANT CHANGE UP
RBC # BLD: 4.45 M/UL — SIGNIFICANT CHANGE UP (ref 3.8–5.2)
RBC # FLD: 13.5 % — SIGNIFICANT CHANGE UP (ref 10.3–14.5)
RBC CASTS # UR COMP ASSIST: SIGNIFICANT CHANGE UP /HPF (ref 0–4)
SAO2 % BLDA: >99 % — HIGH (ref 92–96)
SODIUM SERPL-SCNC: 137 MMOL/L — SIGNIFICANT CHANGE UP (ref 135–145)
SP GR SPEC: 1.01 — SIGNIFICANT CHANGE UP (ref 1.01–1.02)
UROBILINOGEN FLD QL: NEGATIVE — SIGNIFICANT CHANGE UP
WBC # BLD: 4.88 K/UL — SIGNIFICANT CHANGE UP (ref 3.8–10.5)
WBC # FLD AUTO: 4.88 K/UL — SIGNIFICANT CHANGE UP (ref 3.8–10.5)
WBC UR QL: SIGNIFICANT CHANGE UP /HPF (ref 0–5)

## 2019-11-05 PROCEDURE — 93970 EXTREMITY STUDY: CPT | Mod: 26

## 2019-11-05 PROCEDURE — 99291 CRITICAL CARE FIRST HOUR: CPT

## 2019-11-05 PROCEDURE — 71045 X-RAY EXAM CHEST 1 VIEW: CPT | Mod: 26

## 2019-11-05 PROCEDURE — 93010 ELECTROCARDIOGRAM REPORT: CPT

## 2019-11-05 RX ORDER — CEFEPIME 1 G/1
1000 INJECTION, POWDER, FOR SOLUTION INTRAMUSCULAR; INTRAVENOUS EVERY 12 HOURS
Refills: 0 | Status: DISCONTINUED | OUTPATIENT
Start: 2019-11-05 | End: 2019-11-09

## 2019-11-05 RX ORDER — LEVOTHYROXINE SODIUM 125 MCG
150 TABLET ORAL DAILY
Refills: 0 | Status: DISCONTINUED | OUTPATIENT
Start: 2019-11-05 | End: 2019-11-09

## 2019-11-05 RX ORDER — HYDROCHLOROTHIAZIDE 25 MG
12.5 TABLET ORAL DAILY
Refills: 0 | Status: DISCONTINUED | OUTPATIENT
Start: 2019-11-05 | End: 2019-11-05

## 2019-11-05 RX ORDER — ASCORBIC ACID 60 MG
500 TABLET,CHEWABLE ORAL DAILY
Refills: 0 | Status: DISCONTINUED | OUTPATIENT
Start: 2019-11-05 | End: 2019-11-09

## 2019-11-05 RX ORDER — DOCUSATE SODIUM 100 MG
100 CAPSULE ORAL THREE TIMES A DAY
Refills: 0 | Status: DISCONTINUED | OUTPATIENT
Start: 2019-11-05 | End: 2019-11-09

## 2019-11-05 RX ORDER — SODIUM CHLORIDE 9 MG/ML
1550 INJECTION INTRAMUSCULAR; INTRAVENOUS; SUBCUTANEOUS ONCE
Refills: 0 | Status: COMPLETED | OUTPATIENT
Start: 2019-11-05 | End: 2019-11-05

## 2019-11-05 RX ORDER — CEFTRIAXONE 500 MG/1
1000 INJECTION, POWDER, FOR SOLUTION INTRAMUSCULAR; INTRAVENOUS ONCE
Refills: 0 | Status: COMPLETED | OUTPATIENT
Start: 2019-11-05 | End: 2019-11-05

## 2019-11-05 RX ORDER — SODIUM CHLORIDE 9 MG/ML
1000 INJECTION INTRAMUSCULAR; INTRAVENOUS; SUBCUTANEOUS
Refills: 0 | Status: COMPLETED | OUTPATIENT
Start: 2019-11-05 | End: 2019-11-05

## 2019-11-05 RX ORDER — PHENYLEPHRINE HYDROCHLORIDE 10 MG/ML
0.5 INJECTION INTRAVENOUS
Qty: 160 | Refills: 0 | Status: DISCONTINUED | OUTPATIENT
Start: 2019-11-05 | End: 2019-11-05

## 2019-11-05 RX ORDER — SENNA PLUS 8.6 MG/1
2 TABLET ORAL AT BEDTIME
Refills: 0 | Status: DISCONTINUED | OUTPATIENT
Start: 2019-11-05 | End: 2019-11-09

## 2019-11-05 RX ORDER — DONEPEZIL HYDROCHLORIDE 10 MG/1
10 TABLET, FILM COATED ORAL AT BEDTIME
Refills: 0 | Status: DISCONTINUED | OUTPATIENT
Start: 2019-11-05 | End: 2019-11-09

## 2019-11-05 RX ORDER — MEMANTINE HYDROCHLORIDE 10 MG/1
10 TABLET ORAL
Refills: 0 | Status: DISCONTINUED | OUTPATIENT
Start: 2019-11-05 | End: 2019-11-09

## 2019-11-05 RX ORDER — AZITHROMYCIN 500 MG/1
500 TABLET, FILM COATED ORAL ONCE
Refills: 0 | Status: COMPLETED | OUTPATIENT
Start: 2019-11-05 | End: 2019-11-05

## 2019-11-05 RX ORDER — IPRATROPIUM/ALBUTEROL SULFATE 18-103MCG
3 AEROSOL WITH ADAPTER (GRAM) INHALATION EVERY 6 HOURS
Refills: 0 | Status: DISCONTINUED | OUTPATIENT
Start: 2019-11-05 | End: 2019-11-09

## 2019-11-05 RX ORDER — PHENYLEPHRINE HYDROCHLORIDE 10 MG/ML
0.4 INJECTION INTRAVENOUS
Qty: 40 | Refills: 0 | Status: DISCONTINUED | OUTPATIENT
Start: 2019-11-05 | End: 2019-11-06

## 2019-11-05 RX ORDER — ALPRAZOLAM 0.25 MG
3 TABLET ORAL
Refills: 0 | Status: DISCONTINUED | OUTPATIENT
Start: 2019-11-05 | End: 2019-11-09

## 2019-11-05 RX ORDER — IPRATROPIUM/ALBUTEROL SULFATE 18-103MCG
3 AEROSOL WITH ADAPTER (GRAM) INHALATION
Refills: 0 | Status: DISCONTINUED | OUTPATIENT
Start: 2019-11-05 | End: 2019-11-08

## 2019-11-05 RX ORDER — AZTREONAM 2 G
1000 VIAL (EA) INJECTION ONCE
Refills: 0 | Status: COMPLETED | OUTPATIENT
Start: 2019-11-05 | End: 2019-11-05

## 2019-11-05 RX ORDER — SODIUM CHLORIDE 9 MG/ML
1000 INJECTION, SOLUTION INTRAVENOUS
Refills: 0 | Status: DISCONTINUED | OUTPATIENT
Start: 2019-11-05 | End: 2019-11-06

## 2019-11-05 RX ORDER — ACETAMINOPHEN 500 MG
975 TABLET ORAL ONCE
Refills: 0 | Status: COMPLETED | OUTPATIENT
Start: 2019-11-05 | End: 2019-11-05

## 2019-11-05 RX ORDER — NOREPINEPHRINE BITARTRATE/D5W 8 MG/250ML
0.05 PLASTIC BAG, INJECTION (ML) INTRAVENOUS
Qty: 8 | Refills: 0 | Status: DISCONTINUED | OUTPATIENT
Start: 2019-11-05 | End: 2019-11-05

## 2019-11-05 RX ORDER — VANCOMYCIN HCL 1 G
1000 VIAL (EA) INTRAVENOUS DAILY
Refills: 0 | Status: DISCONTINUED | OUTPATIENT
Start: 2019-11-05 | End: 2019-11-07

## 2019-11-05 RX ORDER — CEFEPIME 1 G/1
INJECTION, POWDER, FOR SOLUTION INTRAMUSCULAR; INTRAVENOUS
Refills: 0 | Status: DISCONTINUED | OUTPATIENT
Start: 2019-11-05 | End: 2019-11-05

## 2019-11-05 RX ORDER — ZINC SULFATE TAB 220 MG (50 MG ZINC EQUIVALENT) 220 (50 ZN) MG
220 TAB ORAL DAILY
Refills: 0 | Status: DISCONTINUED | OUTPATIENT
Start: 2019-11-05 | End: 2019-11-09

## 2019-11-05 RX ORDER — MORPHINE SULFATE 50 MG/1
60 CAPSULE, EXTENDED RELEASE ORAL
Refills: 0 | Status: DISCONTINUED | OUTPATIENT
Start: 2019-11-05 | End: 2019-11-09

## 2019-11-05 RX ADMIN — SENNA PLUS 2 TABLET(S): 8.6 TABLET ORAL at 21:12

## 2019-11-05 RX ADMIN — DONEPEZIL HYDROCHLORIDE 10 MILLIGRAM(S): 10 TABLET, FILM COATED ORAL at 21:13

## 2019-11-05 RX ADMIN — CEFTRIAXONE 1000 MILLIGRAM(S): 500 INJECTION, POWDER, FOR SOLUTION INTRAMUSCULAR; INTRAVENOUS at 10:30

## 2019-11-05 RX ADMIN — SODIUM CHLORIDE 1550 MILLILITER(S): 9 INJECTION INTRAMUSCULAR; INTRAVENOUS; SUBCUTANEOUS at 11:00

## 2019-11-05 RX ADMIN — SODIUM CHLORIDE 1000 MILLILITER(S): 9 INJECTION INTRAMUSCULAR; INTRAVENOUS; SUBCUTANEOUS at 12:05

## 2019-11-05 RX ADMIN — SODIUM CHLORIDE 1000 MILLILITER(S): 9 INJECTION INTRAMUSCULAR; INTRAVENOUS; SUBCUTANEOUS at 11:04

## 2019-11-05 RX ADMIN — SODIUM CHLORIDE 1550 MILLILITER(S): 9 INJECTION INTRAMUSCULAR; INTRAVENOUS; SUBCUTANEOUS at 10:00

## 2019-11-05 RX ADMIN — Medication 1000 MILLIGRAM(S): at 12:30

## 2019-11-05 RX ADMIN — Medication 3 MILLILITER(S): at 21:19

## 2019-11-05 RX ADMIN — PHENYLEPHRINE HYDROCHLORIDE 6.46 MICROGRAM(S)/KG/MIN: 10 INJECTION INTRAVENOUS at 15:52

## 2019-11-05 RX ADMIN — AZITHROMYCIN 255 MILLIGRAM(S): 500 TABLET, FILM COATED ORAL at 10:30

## 2019-11-05 RX ADMIN — CEFTRIAXONE 100 MILLIGRAM(S): 500 INJECTION, POWDER, FOR SOLUTION INTRAMUSCULAR; INTRAVENOUS at 10:00

## 2019-11-05 RX ADMIN — Medication 3 MILLILITER(S): at 14:51

## 2019-11-05 RX ADMIN — Medication 250 MILLIGRAM(S): at 21:11

## 2019-11-05 RX ADMIN — Medication 100 MILLIGRAM(S): at 21:15

## 2019-11-05 RX ADMIN — AZITHROMYCIN 500 MILLIGRAM(S): 500 TABLET, FILM COATED ORAL at 11:30

## 2019-11-05 RX ADMIN — SODIUM CHLORIDE 500 MILLILITER(S): 9 INJECTION INTRAMUSCULAR; INTRAVENOUS; SUBCUTANEOUS at 12:19

## 2019-11-05 RX ADMIN — Medication 40 MILLIGRAM(S): at 17:00

## 2019-11-05 RX ADMIN — Medication 125 MILLIGRAM(S): at 12:33

## 2019-11-05 RX ADMIN — Medication 4.04 MICROGRAM(S)/KG/MIN: at 13:31

## 2019-11-05 RX ADMIN — Medication 40 MILLIGRAM(S): at 21:14

## 2019-11-05 RX ADMIN — Medication 3 MILLILITER(S): at 10:20

## 2019-11-05 RX ADMIN — Medication 50 MILLIGRAM(S): at 11:30

## 2019-11-05 RX ADMIN — SODIUM CHLORIDE 125 MILLILITER(S): 9 INJECTION, SOLUTION INTRAVENOUS at 18:45

## 2019-11-05 RX ADMIN — CEFEPIME 100 MILLIGRAM(S): 1 INJECTION, POWDER, FOR SOLUTION INTRAMUSCULAR; INTRAVENOUS at 17:01

## 2019-11-05 RX ADMIN — Medication 975 MILLIGRAM(S): at 11:30

## 2019-11-05 RX ADMIN — Medication 975 MILLIGRAM(S): at 09:45

## 2019-11-05 NOTE — H&P ADULT - NSHPREVIEWOFSYSTEMS_GEN_ALL_CORE
Review of Systems:   	CONSTITUTIONAL: Denies fever, weight loss, or fatigue  	ENT: Denies dysphagia, odynophagia, blurred vision, tinnitus, neck stiffness  	RESPIRATORY: Denies cough, wheeze, hemoptysis, shortness of breath  	CARDIOVASCULAR: Denies chest pain, palpitations irregular HR  	GASTROINTESTINAL:  Denies nausea, vomiting, abdominal pain, diarrhea, constipation, melena, BRBPR, food intolerances  	GENITOURINARY: Dysuria, hematuria, urinary frequency, urinary incontinence  	NEUROLOGICAL: Denies headaches, syncope, near syncope, dizziness, lightheadedness, headaches, seizures  	SKIN: Denies rashes, masses, bruising, lesions   	ENDOCRINE: Denies heat or cold intolerance; No hair loss  	MUSCULOSKELETAL: Denies history of OA or gout, joint swelling  	PSYCHIATRIC: Denies depression, anxiety, mood swings, or difficulty sleeping  	HEME: Denies history of DVT/PE, blood transfusion    	All other ROS reviewed and negative except as otherwise stated

## 2019-11-05 NOTE — H&P ADULT - HISTORY OF PRESENT ILLNESS
68 yo F with PMHx of hypothyroidism, tobacco abuse, chronic back pain, anxiety, ?alzheimers dementia presents to ED via ambulance for eval SOB, confusion. Patient has been resident of [facility] facility for rehabilitation service. Patient has CT scan of chest performed on 11/1/19 that reveals multiple nodules and with patients hx of smoking raises concern for malignancy. Patient was found to be hypoxic and febrile which prompted transfer for further management.    In ED patient placed on BIPAP emergently due to work of breathing. ABG on BIPAP - 7.38/40/459, Lactic 2.5 to 1.7. CXR reveal left mid-lower lung patchy airspace opacification/disease suggestive of PNA.

## 2019-11-05 NOTE — H&P ADULT - NSHPPHYSICALEXAM_GEN_ALL_CORE
VITALS:  T(C): 38.7 (11-05-19 @ 11:30), Max: 40.6 (11-05-19 @ 09:37)  T(F): 101.6 (11-05-19 @ 11:30), Max: 105 (11-05-19 @ 09:37)  HR: 96 (11-05-19 @ 12:43) (95 - 127)  BP: 82/46 (11-05-19 @ 12:43) (80/42 - 137/67)  BP(mean): 54 (11-05-19 @ 12:43) (51 - 87)  RR: 15 (11-05-19 @ 12:43) (14 - 30)  SpO2: 97% (11-05-19 @ 12:43) (86% - 98%)    Ins and Outs     Height (cm): 157.48 (11-05-19 @ 09:37)  Weight (kg): 43.1 (11-05-19 @ 09:37)  BMI (kg/m2): 17.4 (11-05-19 @ 09:37)      Physical Examination:  GENERAL:               Alert, Oriented cachetic appearing  HEENT:                    Pupils equal, reactive to light.  Symmetric. No JVD, Moist MM  PULM:                     NIV BIPAP -  diminished breath sounds throughout   CVS:                         S1, S2,  No Murmur  ABD:                        Soft, nondistended, nontender, normoactive bowel sounds,   EXT:                         Superficial wound about anterior lateral mid to distal third of RLE with small amount of overlying purulence. Trace edema to RLE. Nontender, No Cyanosis or Clubbing   Vascular:                Warm Extremities, Normal Capillary refill, Normal Distal Pulses  SKIN:                       Warm and well perfused, no rashes noted.   NEURO:                  Alert, oriented, interactive, nonfocal, follows commands  PSYC:                      Calm, + Insight. VITALS:  T(C): 38.7 (11-05-19 @ 11:30), Max: 40.6 (11-05-19 @ 09:37)  T(F): 101.6 (11-05-19 @ 11:30), Max: 105 (11-05-19 @ 09:37)  HR: 96 (11-05-19 @ 12:43) (95 - 127)  BP: 82/46 (11-05-19 @ 12:43) (80/42 - 137/67)  BP(mean): 54 (11-05-19 @ 12:43) (51 - 87)  RR: 15 (11-05-19 @ 12:43) (14 - 30)  SpO2: 97% (11-05-19 @ 12:43) (86% - 98%)    Ins and Outs     Height (cm): 157.48 (11-05-19 @ 09:37)  Weight (kg): 43.1 (11-05-19 @ 09:37)  BMI (kg/m2): 17.4 (11-05-19 @ 09:37)      Physical Examination:  GENERAL:               Alert, Oriented cachetic appearing  HEENT:                    Pupils equal, reactive to light.  Symmetric. No JVD, Moist MM  PULM:                     NIV BIPAP -  diminished breath sounds throughout , no gross wheezing  CVS:                         S1, S2,  No Murmur  ABD:                        Soft, nondistended, nontender, normoactive bowel sounds,   EXT:                         Superficial wound about anterior lateral mid to distal third of RLE with small amount of overlying purulence. Trace edema to RLE. Nontender, No Cyanosis or Clubbing   Vascular:                Warm Extremities, Normal Capillary refill, Normal Distal Pulses  SKIN:                       Warm and well perfused, no rashes noted.   NEURO:                  Alert, oriented, interactive, nonfocal, follows commands  PSYC:                      Calm, + Insight.

## 2019-11-05 NOTE — ED PROVIDER NOTE - CLINICAL SUMMARY MEDICAL DECISION MAKING FREE TEXT BOX
89 y f hx of copd recent dx of pneumonia came to ed with cc of ams tmax 105 rectally cxr pneumonia ,hypotensive hypoxic improved with nebs fluids started on abx admitted

## 2019-11-05 NOTE — H&P ADULT - NSHPLABSRESULTS_GEN_ALL_CORE
< from: CT Chest No Cont (11.01.19 @ 14:53) >      EXAM:  CT CHEST        PROCEDURE DATE:  11/01/2019          INTERPRETATION:  CLINICAL INFORMATION: Hyperinflated lungs. Follow-up to   chest radiograph.    COMPARISON: Chest radiographs, the latest dated 9/5/2019, CT abdomen   pelvis 4/10/2016.    PROCEDURE:   CT of the Chest was performed without intravenous contrast.  Sagittal and coronal reformats were performed.      FINDINGS:    LUNGS AND AIRWAYS: Patent central airways.  There is severe changes of centrilobular emphysema with architectural distortion. There are irregular nodular opacities in the left lower lobe with adjacent tree-in-bud like opacities, most likely of infectious or in inflammatory etiology. The largest of these nodular components in a segmental distribution measures 1.1 x 0.7 cm on image #107/series 3. There is another subpleural irregular nodule in the left lower lobe on image #101/series 3 measuring 0.9 x 0.8 cm. The largest nodule is in the left upper lobe peripherally, abutting the pleura and left main fissure and contiguous with the left hilum, measuring 2.3 x 1.5 cm.     PLEURA: No pleural effusion.    MEDIASTINUM AND JEFERSON: No lymphadenopathy.    VESSELS: The ascending aorta is mildly ectatic measuring 3.7 x 3.8 cm at its widest axial dimension. The main pulmonary arteries normal in caliber.    HEART: Heart size is normal. There is aortic valvular calcification. No pericardial effusion.    CHEST WALL AND LOWER NECK: The thyroid gland is small in size. There is no supraclavicular or axillary lymphadenopathy. There are however several subcentimeter bilateral axillary lymph nodes.    VISUALIZED UPPER ABDOMEN: The adrenal glands are within normal limits. The imaged portions of the unenhanced liver and kidneys are within normal limits. The spleen is enlarged.    BONES: Multilevel degenerative change of the thoracic spine with osteophytes, degenerative disc disease and facet joint arthropathy.    IMPRESSION:     Irregular solid nodules in the left lung, the largest inthe left upper lobe measures 2.3 x 1.5 cm, and is contiguous with the left hilum. In the setting of emphysema, malignancy cannot be excluded and further assessment is recommended.    Additional smaller irregular nodules in the left lower lobe, some with tree-in-bud distribution, these may be of infectious or inflammatory etiology.    Justyn Vaughn at Dr. Gudino's office stated that she will bring the report to his attention.    An email with these findings was sent to Dr. JEAN GUDINO 11/1/2019 3:33 PM by Dr. Seymour with read back confirmation. Spoke to Dr. Gudino at approximately 3:38 PM.      < end of copied text >

## 2019-11-05 NOTE — ED ADULT TRIAGE NOTE - CHIEF COMPLAINT QUOTE
BIBA, found  unresponsive at SNF ([facility])  pre-notification to have Respiratory Tx available for Bipap. R/O sepsis  PMH: COPD. Present on arrival: right lower leg wound

## 2019-11-05 NOTE — ED PROVIDER NOTE - QUALITY
altered level of consciousness/non-productive cough/tightness/alteration in breathing pattern/wheezing

## 2019-11-05 NOTE — CONSULT NOTE ADULT - SUBJECTIVE AND OBJECTIVE BOX
CC:  Patient is a 69y old  Female who is admitted for  PNA (2019 20:02), on bipap  Has open wound lateral distal right leg    HPI:  68 yo F with PMHx of hypothyroidism, tobacco abuse, chronic back pain, anxiety, ?alzheimers dementia presents to ED via ambulance for eval SOB, confusion. Patient has been resident of [facility] facility for rehabilitation service. Patient has CT scan of chest performed on 19 that reveals multiple nodules and with patients hx of smoking raises concern for malignancy. Patient was found to be hypoxic and febrile which prompted transfer for further management.    In ED patient placed on BIPAP emergently due to work of breathing. ABG on BIPAP - 7.38/40/459, Lactic 2.5 to 1.7. CXR reveal left mid-lower lung patchy airspace opacification/disease suggestive of PNA. (2019 12:18)     patient underwent debridement plus STSG of RLE medial distal leg  1 year old chronic wound with excellent take of graft.  Now has distal Right leg wound likely trauma related.  H/o smoking and reportedly was smoking recently while at [facility].  Mild pvd, had out patient evaluation by Dr. Rosa.  Also venous hypertension may be present.  Its possible pt may have injured leg during therapy session.      PAST MEDICAL & SURGICAL HISTORY:  Anxiety  HTN (hypertension)  Smoker  COPD (chronic obstructive pulmonary disease)  Wound  Alzheimer disease  Chronic back pain  Hypothyroid  History of arthroplasty of left hip      Allergies    Talwin (Unknown)    Intolerances        SOCIAL HISTORY          Smoking: Yes [ ]  No [ ]   ______pk yrs          ETOH  Yes [ ]  No [ ]  Social [ ]          DRUGS:  Yes [ ]  No [ ]  if so what______________    FAMILY HISTORY:  FH: coronary artery disease      MEDICATIONS  (STANDING):  albuterol/ipratropium for Nebulization 3 milliLiter(s) Nebulizer every 20 minutes  albuterol/ipratropium for Nebulization 3 milliLiter(s) Nebulizer every 6 hours  ALPRAZolam 3 milliGRAM(s) Oral two times a day  ascorbic acid 500 milliGRAM(s) Oral daily  cefepime   IVPB 1000 milliGRAM(s) IV Intermittent every 12 hours  docusate sodium Oral Tab/Cap - Peds 100 milliGRAM(s) Oral three times a day  donepezil 10 milliGRAM(s) Oral at bedtime  doxycycline IVPB      lactated ringers. 1000 milliLiter(s) (125 mL/Hr) IV Continuous <Continuous>  levothyroxine 150 MICROGram(s) Oral daily  memantine 10 milliGRAM(s) Oral two times a day  methylPREDNISolone sodium succinate Injectable 40 milliGRAM(s) IV Push every 8 hours  morphine ER Tablet 60 milliGRAM(s) Oral two times a day  multivitamin 1 Tablet(s) Oral daily  phenylephrine    Infusion 0.4 MICROgram(s)/kG/Min (6.465 mL/Hr) IV Continuous <Continuous>  senna 2 Tablet(s) Oral at bedtime  vancomycin  IVPB 1000 milliGRAM(s) IV Intermittent daily  zinc sulfate 220 milliGRAM(s) Oral daily    MEDICATIONS  (PRN):         Review of systems:  General:  no fever or chills  Pulmonary:  no SOB  Cardiac:  denies chest pain, palpitations  GI:  no nausea, vomitting, or abddominal pain  :  no increase frequency, or burning  Heme:  no easy bruising with minor trauma  Musculoskeletal:  No history of back pain or trauma  Skin:  no history of rashes, injury, trauma  Neuro:  no weakness         Vital Signs Last 24 Hrs  T(C): 37.1 (2019 20:00), Max: 40.6 (2019 09:37)  T(F): 98.8 (2019 20:00), Max: 105 (2019 09:37)  HR: 70 (2019 23:00) (65 - 127)  BP: 110/78 (2019 23:00) (78/46 - 137/67)  BP(mean): 90 (2019 23:00) (51 - 101)  RR: 16 (2019 23:00) (14 - 48)  SpO2: 99% (2019 23:50) (86% - 100%)    Physical Exam:    General:  on BIPAP, removed for 1-2 minutes for conversation, Sat at 93%  Extremities:  right leg skin graft remains well healed.  lateral right leg has wound about 6.5 cm x 4.5 cm x.1 cm that is largely 2 wounds with intact skin  which was included in measurement, mild edema of leg, no redness, wound is both partial and full thickness injury and clean. Where wound is open, color is red.     LABS:                        12.2   4.88  )-----------( 145      ( 2019 09:50 )             37.9         137  |  101  |  18  ----------------------------<  129<H>  4.2   |  28  |  0.81    Ca    8.3<L>      2019 09:50    TPro  7.4  /  Alb  2.9<L>  /  TBili  0.5  /  DBili  x   /  AST  48<H>  /  ALT  42  /  AlkPhos  112      PT/INR - ( 2019 09:50 )   PT: 13.3 sec;   INR: 1.18 ratio         PTT - ( 2019 09:50 )  PTT:32.9 sec  Urinalysis Basic - ( 2019 09:50 )    Color: Yellow / Appearance: Clear / S.015 / pH: x  Gluc: x / Ketone: Negative  / Bili: Negative / Urobili: Negative   Blood: x / Protein: Negative / Nitrite: Positive   Leuk Esterase: Trace / RBC: 0-4 /HPF / WBC 3-5 /HPF   Sq Epi: x / Non Sq Epi: Neg.-Few / Bacteria: Many /HPF        RADIOLOGY & ADDITIONAL STUDIES:    Risks, benefits, and alternatives to treatment discussed. All questions answered with understanding.    Procedure Performed:  (  )Yes     (x  )No  Name of Procedure:      [  ]Debridement     [  ]I&D    [  ]Laceration Repair     [  ]Other:  (  )partial thickness     (  )full thickness     (  )subcutaneous     (  )muscle/tendon     (  )bone  (  )sharp     (  )surgical

## 2019-11-05 NOTE — PROGRESS NOTE ADULT - SUBJECTIVE AND OBJECTIVE BOX
69y year old Female admitted for Patient is a 69y old  Female who presents with a chief complaint of PNA (2019 16:23)        68 yo F with history of anxiety, smoker, COPD, alzheimer's disease?,  hypothyroidism, HTN, RLE venous stasis ulcer s/p skin graft in July per patient, sent form [facility] today for SOB and fever, found to be hypoxic and with labored breathing requiring bipap, CXR showed left sided pneumonia, T max 105, started on antibiotics. Patient currently states she feels better, her breathing is improved, she denies fever or chills, no chest pain.       PMH:  Sepsis  FH: coronary artery disease  No pertinent family history in first degree relatives  MEWS Score  Anxiety  HTN (hypertension)  Smoker  COPD (chronic obstructive pulmonary disease)  Wound  Alzheimer disease  Chronic back pain  Hypothyroid  Severe sepsis  History of arthroplasty of left hip  No significant past surgical history  RESPIRATORY DISTRESS  58  Acute pneumonia        MEDICATIONS  (STANDING):  albuterol/ipratropium for Nebulization 3 milliLiter(s) Nebulizer every 20 minutes  albuterol/ipratropium for Nebulization 3 milliLiter(s) Nebulizer every 6 hours  ALPRAZolam 3 milliGRAM(s) Oral two times a day  ascorbic acid 500 milliGRAM(s) Oral daily  cefepime   IVPB 1000 milliGRAM(s) IV Intermittent every 12 hours  docusate sodium Oral Tab/Cap - Peds 100 milliGRAM(s) Oral three times a day  donepezil 10 milliGRAM(s) Oral at bedtime  doxycycline IVPB      lactated ringers. 1000 milliLiter(s) (125 mL/Hr) IV Continuous <Continuous>  levothyroxine 150 MICROGram(s) Oral daily  memantine 10 milliGRAM(s) Oral two times a day  methylPREDNISolone sodium succinate Injectable 40 milliGRAM(s) IV Push every 8 hours  morphine ER Tablet 60 milliGRAM(s) Oral two times a day  multivitamin 1 Tablet(s) Oral daily  phenylephrine    Infusion 0.4 MICROgram(s)/kG/Min (6.465 mL/Hr) IV Continuous <Continuous>  senna 2 Tablet(s) Oral at bedtime  vancomycin  IVPB 1000 milliGRAM(s) IV Intermittent daily  zinc sulfate 220 milliGRAM(s) Oral daily    MEDICATIONS  (PRN):      I&O's Summary    2019 07:01  -  2019 22:03  --------------------------------------------------------  IN: 464 mL / OUT: 551 mL / NET: -87 mL      ICU Vital Signs Last 24 Hrs  T(C): 37.1 (2019 20:00), Max: 40.6 (2019 09:37)  T(F): 98.8 (2019 20:00), Max: 105 (2019 09:37)  HR: 75 (2019 21:41) (65 - 127)  BP: 122/77 (2019 21:30) (78/46 - 137/67)  BP(mean): 93 (2019 21:30) (51 - 101)  ABP: --  ABP(mean): --  RR: 18 (2019 21:30) (14 - 48)  SpO2: 99% (2019 21:41) (86% - 100%)      PHYSICAL EXAM:  General: NAD conversant  Neck: No JVD, supple  HEENT: NCAT EOMI, non icteric scelra, no epistaxis or rhinorrhea  CV: s1s2 RRR no murmurs, no carotid bruits  pulm: Clear bilaterally no wheezing rales or rhonchi  GI: soft NTND, positive BS, no hepatomegly, no splenomegly  Skin/Nails: No pallor, cyanosis or erythema, no rash. no clubbing good cap refil  Extremities: warm throughout no periferal edema  Neuro: AAOx 3, non focal exam, 5/5 strength throughout  POCUS:             137  |  101  |  18  ----------------------------<  129<H>  4.2   |  28  |  0.81    Ca    8.3<L>      2019 09:50    TPro  7.4  /  Alb  2.9<L>  /  TBili  0.5  /  DBili  x   /  AST  48<H>  /  ALT  42  /  AlkPhos  112                            12.2   4.88  )-----------( 145      ( 2019 09:50 )             37.9     ABG - ( 2019 10:20 )  pH, Arterial: 7.38  pH, Blood: x     /  pCO2: 40    /  pO2: 459   / HCO3: x     / Base Excess: x     /  SaO2: >99                   Urinalysis Basic - ( 2019 09:50 )    Color: Yellow / Appearance: Clear / S.015 / pH: x  Gluc: x / Ketone: Negative  / Bili: Negative / Urobili: Negative   Blood: x / Protein: Negative / Nitrite: Positive   Leuk Esterase: Trace / RBC: 0-4 /HPF / WBC 3-5 /HPF   Sq Epi: x / Non Sq Epi: Neg.-Few / Bacteria: Many /HPF               A/P:     35 min spent face to face with patient 69y year old Female admitted for Patient is a 69y old  Female who presents with a chief complaint of PNA (2019 16:23)        70 yo F with history of anxiety, smoker, COPD, alzheimer's disease?,  hypothyroidism, HTN, RLE venous stasis ulcer s/p skin graft in July per patient, sent form [facility] today for SOB and fever, found to be hypoxic and with labored breathing requiring bipap, CXR showed left sided pneumonia, T max 105, started on antibiotics. Patient currently states she feels better, her breathing is improved, she denies fever or chills, no chest pain.       PMH:  Sepsis  FH: coronary artery disease  No pertinent family history in first degree relatives  MEWS Score  Anxiety  HTN (hypertension)  Smoker  COPD (chronic obstructive pulmonary disease)  Wound  Alzheimer disease  Chronic back pain  Hypothyroid  Severe sepsis  History of arthroplasty of left hip  No significant past surgical history  RESPIRATORY DISTRESS  58  Acute pneumonia        MEDICATIONS  (STANDING):  albuterol/ipratropium for Nebulization 3 milliLiter(s) Nebulizer every 20 minutes  albuterol/ipratropium for Nebulization 3 milliLiter(s) Nebulizer every 6 hours  ALPRAZolam 3 milliGRAM(s) Oral two times a day  ascorbic acid 500 milliGRAM(s) Oral daily  cefepime   IVPB 1000 milliGRAM(s) IV Intermittent every 12 hours  docusate sodium Oral Tab/Cap - Peds 100 milliGRAM(s) Oral three times a day  donepezil 10 milliGRAM(s) Oral at bedtime  doxycycline IVPB      lactated ringers. 1000 milliLiter(s) (125 mL/Hr) IV Continuous <Continuous>  levothyroxine 150 MICROGram(s) Oral daily  memantine 10 milliGRAM(s) Oral two times a day  methylPREDNISolone sodium succinate Injectable 40 milliGRAM(s) IV Push every 8 hours  morphine ER Tablet 60 milliGRAM(s) Oral two times a day  multivitamin 1 Tablet(s) Oral daily  phenylephrine    Infusion 0.4 MICROgram(s)/kG/Min (6.465 mL/Hr) IV Continuous <Continuous>  senna 2 Tablet(s) Oral at bedtime  vancomycin  IVPB 1000 milliGRAM(s) IV Intermittent daily  zinc sulfate 220 milliGRAM(s) Oral daily    MEDICATIONS  (PRN):      I&O's Summary    2019 07:01  -  2019 22:03  --------------------------------------------------------  IN: 464 mL / OUT: 551 mL / NET: -87 mL      ICU Vital Signs Last 24 Hrs  T(C): 37.1 (2019 20:00), Max: 40.6 (2019 09:37)  T(F): 98.8 (2019 20:00), Max: 105 (2019 09:37)  HR: 75 (2019 21:41) (65 - 127)  BP: 122/77 (2019 21:30) (78/46 - 137/67)  BP(mean): 93 (2019 21:30) (51 - 101)  ABP: --  ABP(mean): --  RR: 18 (2019 21:30) (14 - 48)  SpO2: 99% (2019 21:41) (86% - 100%)      PHYSICAL EXAM:  General: NAD conversant  Neck: No JVD, supple  HEENT: NCAT EOMI, non icteric scelra, no epistaxis or rhinorrhea  CV: s1s2 RRR no murmurs, no carotid bruits  pulm: Clear bilaterally no wheezing rales or rhonchi  GI: soft NTND, positive BS, no hepatomegly, no splenomegly  Skin/Nails: No pallor, cyanosis or erythema, no rash. no clubbing good cap refil  Extremities: warm throughout no periferal edema.   right leg with dressing over area that recieved skin graft, some blood on dressing.  Left thigh graft donor site healing well no evidence of infection   Psych: normal affect  and behavior   Neuro: AAOx 3  POCUS:             137  |  101  |  18  ----------------------------<  129<H>  4.2   |  28  |  0.81    Ca    8.3<L>      2019 09:50    TPro  7.4  /  Alb  2.9<L>  /  TBili  0.5  /  DBili  x   /  AST  48<H>  /  ALT  42  /  AlkPhos  112  11-05                          12.2   4.88  )-----------( 145      ( 2019 09:50 )             37.9     ABG - ( 2019 10:20 )  pH, Arterial: 7.38  pH, Blood: x     /  pCO2: 40    /  pO2: 459   / HCO3: x     / Base Excess: x     /  SaO2: >99                   Urinalysis Basic - ( 2019 09:50 )    Color: Yellow / Appearance: Clear / S.015 / pH: x  Gluc: x / Ketone: Negative  / Bili: Negative / Urobili: Negative   Blood: x / Protein: Negative / Nitrite: Positive   Leuk Esterase: Trace / RBC: 0-4 /HPF / WBC 3-5 /HPF   Sq Epi: x / Non Sq Epi: Neg.-Few / Bacteria: Many /HPF               A/P:   COPD exacerbation   pneumonia  hypoxic respiratory failure on bipap  severe sepsis   Hypothyroidism  RLE venous stasis ulcer s/p skin graft        70 yo F with history of anxiety, smoker, COPD, alzheimer's disease?,  hypothyroidism, HTN, RLE venous stasis ulcer s/p skin graft in July per patient, sent form [facility] today for SOB and fever, found to be hypoxic and with labored breathing requiring bipap, CXR showed left sided pneumonia, T max 105, started on antibiotics. Patient currently states she feels better, her breathing is improved, she denies fever or chills, no chest pain.   - Breathing has improved with bipap, wean settings from 12/6 40% to 8/5 21%.  keep Bipap for tonigt  - continue 69y year old Female admitted for Patient is a 69y old  Female who presents with a chief complaint of PNA (2019 16:23)        70 yo F with history of anxiety, smoker, COPD, alzheimer's disease?,  hypothyroidism, HTN, RLE venous stasis ulcer s/p skin graft in July per patient, sent [facility] today for SOB and fever, found to be hypoxic and with labored breathing requiring bipap, hypotensive requiring IV pressors, CXR showed left sided pneumonia, T max 105, started on antibiotics. Patient currently states she feels better, her breathing is improved, she denies fever or chills, no chest pain.       PMH:  Sepsis  FH: coronary artery disease  No pertinent family history in first degree relatives  MEWS Score  Anxiety  HTN (hypertension)  Smoker  COPD (chronic obstructive pulmonary disease)  Wound  Alzheimer disease  Chronic back pain  Hypothyroid  Severe sepsis  History of arthroplasty of left hip  No significant past surgical history  RESPIRATORY DISTRESS  58  Acute pneumonia        MEDICATIONS  (STANDING):  albuterol/ipratropium for Nebulization 3 milliLiter(s) Nebulizer every 20 minutes  albuterol/ipratropium for Nebulization 3 milliLiter(s) Nebulizer every 6 hours  ALPRAZolam 3 milliGRAM(s) Oral two times a day  ascorbic acid 500 milliGRAM(s) Oral daily  cefepime   IVPB 1000 milliGRAM(s) IV Intermittent every 12 hours  docusate sodium Oral Tab/Cap - Peds 100 milliGRAM(s) Oral three times a day  donepezil 10 milliGRAM(s) Oral at bedtime  doxycycline IVPB      lactated ringers. 1000 milliLiter(s) (125 mL/Hr) IV Continuous <Continuous>  levothyroxine 150 MICROGram(s) Oral daily  memantine 10 milliGRAM(s) Oral two times a day  methylPREDNISolone sodium succinate Injectable 40 milliGRAM(s) IV Push every 8 hours  morphine ER Tablet 60 milliGRAM(s) Oral two times a day  multivitamin 1 Tablet(s) Oral daily  phenylephrine    Infusion 0.4 MICROgram(s)/kG/Min (6.465 mL/Hr) IV Continuous <Continuous>  senna 2 Tablet(s) Oral at bedtime  vancomycin  IVPB 1000 milliGRAM(s) IV Intermittent daily  zinc sulfate 220 milliGRAM(s) Oral daily    MEDICATIONS  (PRN):      I&O's Summary    2019 07:01  -  2019 22:03  --------------------------------------------------------  IN: 464 mL / OUT: 551 mL / NET: -87 mL      ICU Vital Signs Last 24 Hrs  T(C): 37.1 (2019 20:00), Max: 40.6 (2019 09:37)  T(F): 98.8 (2019 20:00), Max: 105 (2019 09:37)  HR: 75 (2019 21:41) (65 - 127)  BP: 122/77 (2019 21:30) (78/46 - 137/67)  BP(mean): 93 (2019 21:30) (51 - 101)  ABP: --  ABP(mean): --  RR: 18 (2019 21:30) (14 - 48)  SpO2: 99% (2019 21:41) (86% - 100%)      PHYSICAL EXAM:  General: NAD conversant  Neck: No JVD, supple  HEENT: NCAT EOMI, non icteric scelra, no epistaxis or rhinorrhea  CV: s1s2 RRR no murmurs, no carotid bruits  pulm: Clear bilaterally no wheezing rales or rhonchi  GI: soft NTND, positive BS, no hepatomegly, no splenomegly  Skin/Nails: No pallor, cyanosis or erythema, no rash. no clubbing good cap refil  Extremities: warm throughout no periferal edema.   right leg with dressing over area that recieved skin graft, some blood on dressing.  Left thigh graft donor site healing well no evidence of infection   Psych: normal affect  and behavior   Neuro: AAOx 3  POCUS:             137  |  101  |  18  ----------------------------<  129<H>  4.2   |  28  |  0.81    Ca    8.3<L>      2019 09:50    TPro  7.4  /  Alb  2.9<L>  /  TBili  0.5  /  DBili  x   /  AST  48<H>  /  ALT  42  /  AlkPhos  112                            12.2   4.88  )-----------( 145      ( 2019 09:50 )             37.9     ABG - ( 2019 10:20 )  pH, Arterial: 7.38  pH, Blood: x     /  pCO2: 40    /  pO2: 459   / HCO3: x     / Base Excess: x     /  SaO2: >99                   Urinalysis Basic - ( 2019 09:50 )    Color: Yellow / Appearance: Clear / S.015 / pH: x  Gluc: x / Ketone: Negative  / Bili: Negative / Urobili: Negative   Blood: x / Protein: Negative / Nitrite: Positive   Leuk Esterase: Trace / RBC: 0-4 /HPF / WBC 3-5 /HPF   Sq Epi: x / Non Sq Epi: Neg.-Few / Bacteria: Many /HPF               A/P:   COPD exacerbation   pneumonia  hypoxic respiratory failure on bipap  septic shock   Hypothyroidism  RLE venous stasis ulcer s/p skin graft  anxiety  alzheimers        70 yo F with history of anxiety, smoker, COPD, alzheimer's disease?,  hypothyroidism, HTN, RLE venous stasis ulcer s/p skin graft in July per patient, sent [facility] today for SOB and fever, found to be hypoxic and with labored breathing requiring bipap, hypotensive unresponsive to fluid, started on pressors, CXR showed left sided pneumonia that is new compared to CT scan from , T max 105, started on antibiotics. Patient currently states she feels better, her breathing is improved, she denies fever or chills, no chest pain.   - Breathing has improved with bipap, wean settings from 12/6 40% to 8/5 21%.  keep Bipap for tonight  - continue combivent and methylprednisolone  - continue donezipil.  Xanax for anxiety   - still requiring pressors, titrate Neosynephrine to MAP 65  - Continue empiric cefepime, vancomycin and doxcycline, follow up cultures  - May benefit from wound care consult to manage RLE skin graft  - Synthroid for hypothyroidism  - CT scan from  with nodules, needs malignancy workup in future  - continue ICU care    CC time 30 min inlcuding time spent evaluating patient at bedside, reviewing chart, ordering treatments, discussing care with patient and MDT, not including procedure time.

## 2019-11-05 NOTE — PATIENT PROFILE ADULT - VISION (WITH CORRECTIVE LENSES IF THE PATIENT USUALLY WEARS THEM):
Normal vision: sees adequately in most situations; can see medication labels, newsprint reading glasses. no glasses present/Partially impaired: cannot see medication labels or newsprint, but can see obstacles in path, and the surrounding layout; can count fingers at arm's length

## 2019-11-05 NOTE — ED ADULT NURSE NOTE - OBJECTIVE STATEMENT
Pt arrived to ER [facility] from [facility] c/o altered mental status. Per EMS pt was found unresponsive and febrile at facility. Pt alert and lethargic at this time. Pt arousable to voice and light stimulation. Pt with o2 saturation at 86%. Pt with hx of COPD. Pt with rectal temperature of 105 on arrival. Pt arrived to ER [facility] from [facility] c/o altered mental status. Per EMS pt was found unresponsive and febrile at facility. Pt alert and lethargic at this time. Pt arousable to voice and light stimulation. Pt with o2 saturation at 86%. Pt with hx of COPD. Pt with rectal temperature of 105 on arrival. Pt with skin graft to right shin.

## 2019-11-05 NOTE — CONSULT NOTE ADULT - SUBJECTIVE AND OBJECTIVE BOX
HPI:   Patient is a 68 yo F with PMHx of hypothyroidism, tobacco abuse, chronic back pain, anxiety, alzheimers dementia presents to ED via ambulance for eval SOB, confusion. Patient is a resident of [facility] facility for rehabilitation service. Patient was found to be hypoxic and febrile which prompted transfer for further management. In ED patient placed on BIPAP emergently due to work of breathing. CXR reveal left mid-lower lung patchy airspace opacification/disease. She was seen by the ICU team and she was started on Cefepime and doxycycline We were consulted to assist with the antibiotic management and treatment of pneumonia. 		    REVIEW OF SYSTEMS:  All other review of systems negative (Comprehensive ROS)    PAST MEDICAL & SURGICAL HISTORY:  Anxiety  HTN (hypertension)  Smoker  COPD (chronic obstructive pulmonary disease)  Wound  Alzheimer disease  Chronic back pain  Hypothyroid  History of arthroplasty of left hip      Allergies    Talwin (Unknown)    Intolerances    FAMILY HISTORY:  FH: coronary artery disease      SOCIAL HISTORY:  she is a resident of [facility]     T(F): 99.7 (19 @ 14:50), Max: 105 (19 @ 09:37)  HR: 84 (19 @ 15:10)  BP: 101/69 (19 @ 14:50)  RR: 14 (19 @ 14:50)  SpO2: 100% (19 @ 15:10)  Wt(kg): --    PHYSICAL EXAM:  General: she is lethargic she has a bipap mask on   Eyes:  anicteric, no conjunctival injection, no discharge  Oropharynx: bipap mask in place   Neck: supple, without adenopathy  Lungs: clear anterior   Heart: regular rate and rhythm; no murmur, rubs or gallops  Abdomen: soft, nondistended, nontender, without mass or organomegaly  Skin: no lesions  Extremities: no clubbing, cyanosis, or edema  Neurologic: alert, oriented, moves all extremities    LAB RESULTS:                        12.2   4.88  )-----------( 145      ( 2019 09:50 )             37.9         137  |  101  |  18  ----------------------------<  129<H>  4.2   |  28  |  0.81    Ca    8.3<L>      2019 09:50    TPro  7.4  /  Alb  2.9<L>  /  TBili  0.5  /  DBili  x   /  AST  48<H>  /  ALT  42  /  AlkPhos  112  11-05    LIVER FUNCTIONS - ( 2019 09:50 )  Alb: 2.9 g/dL / Pro: 7.4 g/dL / ALK PHOS: 112 U/L / ALT: 42 U/L / AST: 48 U/L / GGT: x           Urinalysis Basic - ( 2019 09:50 )    Color: Yellow / Appearance: Clear / S.015 / pH: x  Gluc: x / Ketone: Negative  / Bili: Negative / Urobili: Negative   Blood: x / Protein: Negative / Nitrite: Positive   Leuk Esterase: Trace / RBC: 0-4 /HPF / WBC 3-5 /HPF   Sq Epi: x / Non Sq Epi: Neg.-Few / Bacteria: Many /HPF        MICROBIOLOGY:  RECENT CULTURES:        RADIOLOGY REVIEWED:      Antimicrobials Day #    cefepime   IVPB 1000 milliGRAM(s) IV Intermittent every 12 hours  doxycycline IVPB      vancomycin  IVPB 1000 milliGRAM(s) IV Intermittent daily    Other Medications:  albuterol/ipratropium for Nebulization 3 milliLiter(s) Nebulizer every 20 minutes  albuterol/ipratropium for Nebulization 3 milliLiter(s) Nebulizer every 6 hours  ALPRAZolam 3 milliGRAM(s) Oral two times a day  ascorbic acid 500 milliGRAM(s) Oral daily  docusate sodium Oral Tab/Cap - Peds 100 milliGRAM(s) Oral three times a day  donepezil 10 milliGRAM(s) Oral at bedtime  lactated ringers. 1000 milliLiter(s) IV Continuous <Continuous>  levothyroxine 150 MICROGram(s) Oral daily  memantine 10 milliGRAM(s) Oral two times a day  methylPREDNISolone sodium succinate Injectable 40 milliGRAM(s) IV Push every 8 hours  morphine ER Tablet 60 milliGRAM(s) Oral two times a day  multivitamin 1 Tablet(s) Oral daily  phenylephrine    Infusion 0.4 MICROgram(s)/kG/Min IV Continuous <Continuous>  senna 2 Tablet(s) Oral at bedtime  zinc sulfate 220 milliGRAM(s) Oral daily

## 2019-11-05 NOTE — H&P ADULT - ASSESSMENT
ASSESSMENT:  1) LLL PNA  2) Hypoxic respiratory failure  3) Lung nodules  4) RLE cellulitis/wound  5) Sepsis      PLAN:  - BIPAP - titrate settings as tolerated  - Cefepime, Vanco, Doxy  - Follow-up cultures - RVP, Blood, Urine, Sputum   - Trend labs including ABG  - Peripheral Neosynephrine for hypotension  - Bilateral lower extremity doppler to r/o DVT - if pos CTA Chest  - Cardiac monitoring  - Continue home meds.  - Solu-medrol 40mg with eventual taper  - Duonebs Q4H.   - Echo  - ID consult     GOC: Full CODE - patient requests that in the event she is unable to make decisions for herself that, Marilin, her friend present at bedside be appointment he medical decision maker. ASSESSMENT:  1) Severe sepsis due to LLL PNA  2) Hypoxic respiratory failure / Respiratory distress requiring NIV   3) Lung nodules vs Lung mass recently diagnossed  4) RLE cellulitis/wound s/p Skin graft  5) Possible acute on chronic COPD exacerbation , Ex smoker  6) Underlying Anxiety, hypothyroid,       PLAN:  - BIPAP - titrate settings as tolerated  - Cefepime, Vanco, Doxy  - Steroids, Nebs  - Follow-up cultures - RVP, Blood, Urine, Sputum   - Trend labs including ABG  - Peripheral Neosynephrine for hypotension may be required   - Bilateral lower extremity doppler to r/o DVT - if pos CTA Chest  - ID eval  - F/u lactic acid   - repeat ABG  - Dr. Evans wound consult called for RLE chronic lesion    - once stabalized pt will need PET scan to evaluate lung lesions r/o ca.       PMD:	Dr. Leonard 			                   Notified(Date):11/5/19  Family Updated: 		Marilin kimble - [phone]                                 Date:  11/5/19 pt states she can make medical decisions if unable to do so.      Sedation & Analgesia:	as above  Diet/Nutrition:		as tolerated  GI PPx:			ppi    DVT Ppx:		Hep sq  	RISK                                                          Points  	[ ] Previous VTE                                           	3  	[ ] Thrombophilia                                        	2  	[ ] Lower limb paralysis                              	2   	[ ] Current Cancer                                       	2   	[ ] Immobilization > 24 hrs                        	1  	[x ] ICU/CCU stay > 24 hours                       	1  	[x ] Age > 60                                                   	1  		Total:[2 ]      Activity:		          as tolerated       Head of Bed:               35-45  Glycemic Control:        n/a    Lines:  PIV  CENTRAL LINE: 	[ ] YES [ x] NO	                    LOCATION:   	                       DATE INSERTED:   	                    REMOVE:  [ ] YES [ ] NO    A-LINE:  	                [ ] YES [x ] NO                      LOCATION:   	                       DATE INSERTED: 		            REMOVE:  [ ] YES [ ] NO    PINO: 		        [ ] YES [x ] NO  		                                       DATE INSERTED:		            REMOVE:  [ ] YES [ ] NO      Restraints were deemed necessary to prevent removal of life-sustaining devices [  ] YES   [ x   ]  NO    Disposition: ICU monitoring    Goals of Care: Full     - Cardiac monitoring  - Continue home meds.  - Solu-medrol 40mg with eventual taper  - Duonebs Q4H.   - Echo  - ID consult     GOC: Full CODE - patient requests that in the event she is unable to make decisions for herself that, Marilin, her friend present at bedside be appointment he medical decision maker.

## 2019-11-05 NOTE — H&P ADULT - NSICDXPASTMEDICALHX_GEN_ALL_CORE_FT
PAST MEDICAL HISTORY:  Alzheimer disease     Anxiety     Chronic back pain     COPD (chronic obstructive pulmonary disease)     HTN (hypertension)     Hypothyroid     Smoker     Wound

## 2019-11-05 NOTE — ED PROVIDER NOTE - PMH
Alzheimer disease    Anxiety    Chronic back pain    COPD (chronic obstructive pulmonary disease)    HTN (hypertension)    Hypothyroid    Smoker    Wound

## 2019-11-05 NOTE — ED ADULT NURSE NOTE - ED STAT RN HANDOFF DETAILS
Additional report given to LARISA Briceno at bedside. RN made aware sepsis protocol was initiated and repeat lactate was performed. RN made aware of all medications given in the ER. Additional report given to LARISA Briceno at bedside. RN made aware sepsis protocol was initiated and repeat lactate was performed. RN made aware of all medications given in the ER. RN made aware nasal swab sent and pt placed on isolation. RN made aware pt with skin graft on right lower leg.

## 2019-11-05 NOTE — CONSULT NOTE ADULT - ASSESSMENT
A:  Pneumonia on Antibiotics, steroids, bipap.  Open wound right leg likely traumatic.  Well-healed skin graft right leg  -suggest, clean with normal saline, then apply xeroform gauze with protective dressing, change every other day, apply ace bandage when out of bed

## 2019-11-05 NOTE — ED ADULT NURSE NOTE - NSIMPLEMENTINTERV_GEN_ALL_ED
Implemented All Fall Risk Interventions:  [place] to call system. Call bell, personal items and telephone within reach. Instruct patient to call for assistance. Room bathroom lighting operational. Non-slip footwear when patient is off stretcher. Physically safe environment: no spills, clutter or unnecessary equipment. Stretcher in lowest position, wheels locked, appropriate side rails in place. Provide visual cue, wrist band, yellow gown, etc. Monitor gait and stability. Monitor for mental status changes and reorient to person, place, and time. Review medications for side effects contributing to fall risk. Reinforce activity limits and safety measures with patient and family.

## 2019-11-05 NOTE — CONSULT NOTE ADULT - ASSESSMENT
Patient is a 68 yo F with PMHx of hypothyroidism, tobacco abuse, chronic back pain, anxiety, alzheimers dementia presents to ED via ambulance for eval SOB, confusion. In the ED she was found to have t max of 105.  ddx for fever includes HCAP. She also has on he RLE anterior shin are a wound noted to have some bleeding it is covered with a dressing, this potential could be a portal of entry for bacteria?    Plan   1.	agree with Vancomycin, Cefepime and doxycyline pending culture data   2.	continue supportive care as per ICU team

## 2019-11-06 LAB
ANION GAP SERPL CALC-SCNC: 11 MMOL/L — SIGNIFICANT CHANGE UP (ref 5–17)
BLOOD GAS COMMENTS ARTERIAL: SIGNIFICANT CHANGE UP
BUN SERPL-MCNC: 14 MG/DL — SIGNIFICANT CHANGE UP (ref 7–23)
CALCIUM SERPL-MCNC: 8.5 MG/DL — SIGNIFICANT CHANGE UP (ref 8.4–10.5)
CHLORIDE SERPL-SCNC: 106 MMOL/L — SIGNIFICANT CHANGE UP (ref 96–108)
CO2 BLDA-SCNC: 25 MMOL/L — SIGNIFICANT CHANGE UP (ref 22–30)
CO2 SERPL-SCNC: 24 MMOL/L — SIGNIFICANT CHANGE UP (ref 22–31)
CREAT SERPL-MCNC: 0.72 MG/DL — SIGNIFICANT CHANGE UP (ref 0.5–1.3)
GAS PNL BLDA: SIGNIFICANT CHANGE UP
GLUCOSE BLDC GLUCOMTR-MCNC: 122 MG/DL — HIGH (ref 70–99)
GLUCOSE SERPL-MCNC: 131 MG/DL — HIGH (ref 70–99)
HCT VFR BLD CALC: 34.8 % — SIGNIFICANT CHANGE UP (ref 34.5–45)
HGB BLD-MCNC: 11.5 G/DL — SIGNIFICANT CHANGE UP (ref 11.5–15.5)
HOROWITZ INDEX BLDA+IHG-RTO: SIGNIFICANT CHANGE UP
MAGNESIUM SERPL-MCNC: 1.3 MG/DL — LOW (ref 1.6–2.6)
MCHC RBC-ENTMCNC: 27.4 PG — SIGNIFICANT CHANGE UP (ref 27–34)
MCHC RBC-ENTMCNC: 33 GM/DL — SIGNIFICANT CHANGE UP (ref 32–36)
MCV RBC AUTO: 82.9 FL — SIGNIFICANT CHANGE UP (ref 80–100)
NRBC # BLD: 0 /100 WBCS — SIGNIFICANT CHANGE UP (ref 0–0)
PCO2 BLDA: 40 MMHG — SIGNIFICANT CHANGE UP (ref 32–46)
PH BLDA: 7.4 — SIGNIFICANT CHANGE UP (ref 7.35–7.45)
PHOSPHATE SERPL-MCNC: 3.3 MG/DL — SIGNIFICANT CHANGE UP (ref 2.5–4.5)
PLATELET # BLD AUTO: 141 K/UL — LOW (ref 150–400)
PO2 BLDA: 72 MMHG — LOW (ref 74–108)
POTASSIUM SERPL-MCNC: 3.4 MMOL/L — LOW (ref 3.5–5.3)
POTASSIUM SERPL-SCNC: 3.4 MMOL/L — LOW (ref 3.5–5.3)
RBC # BLD: 4.2 M/UL — SIGNIFICANT CHANGE UP (ref 3.8–5.2)
RBC # FLD: 13.7 % — SIGNIFICANT CHANGE UP (ref 10.3–14.5)
SAO2 % BLDA: 94 % — SIGNIFICANT CHANGE UP (ref 92–96)
SODIUM SERPL-SCNC: 141 MMOL/L — SIGNIFICANT CHANGE UP (ref 135–145)
WBC # BLD: 10.09 K/UL — SIGNIFICANT CHANGE UP (ref 3.8–10.5)
WBC # FLD AUTO: 10.09 K/UL — SIGNIFICANT CHANGE UP (ref 3.8–10.5)

## 2019-11-06 PROCEDURE — 93306 TTE W/DOPPLER COMPLETE: CPT | Mod: 26

## 2019-11-06 RX ORDER — SALIVA SUBSTITUTE COMB NO.11 351 MG
5 POWDER IN PACKET (EA) MUCOUS MEMBRANE
Refills: 0 | Status: DISCONTINUED | OUTPATIENT
Start: 2019-11-06 | End: 2019-11-09

## 2019-11-06 RX ADMIN — Medication 40 MILLIGRAM(S): at 14:41

## 2019-11-06 RX ADMIN — Medication 40 MILLIGRAM(S): at 05:08

## 2019-11-06 RX ADMIN — MORPHINE SULFATE 60 MILLIGRAM(S): 50 CAPSULE, EXTENDED RELEASE ORAL at 05:07

## 2019-11-06 RX ADMIN — Medication 3 MILLILITER(S): at 04:46

## 2019-11-06 RX ADMIN — CEFEPIME 100 MILLIGRAM(S): 1 INJECTION, POWDER, FOR SOLUTION INTRAMUSCULAR; INTRAVENOUS at 05:10

## 2019-11-06 RX ADMIN — Medication 1 TABLET(S): at 13:10

## 2019-11-06 RX ADMIN — MORPHINE SULFATE 60 MILLIGRAM(S): 50 CAPSULE, EXTENDED RELEASE ORAL at 17:34

## 2019-11-06 RX ADMIN — DONEPEZIL HYDROCHLORIDE 10 MILLIGRAM(S): 10 TABLET, FILM COATED ORAL at 21:37

## 2019-11-06 RX ADMIN — ZINC SULFATE TAB 220 MG (50 MG ZINC EQUIVALENT) 220 MILLIGRAM(S): 220 (50 ZN) TAB at 13:10

## 2019-11-06 RX ADMIN — Medication 150 MICROGRAM(S): at 05:09

## 2019-11-06 RX ADMIN — Medication 3 MILLILITER(S): at 21:45

## 2019-11-06 RX ADMIN — CEFEPIME 100 MILLIGRAM(S): 1 INJECTION, POWDER, FOR SOLUTION INTRAMUSCULAR; INTRAVENOUS at 17:33

## 2019-11-06 RX ADMIN — Medication 110 MILLIGRAM(S): at 20:01

## 2019-11-06 RX ADMIN — Medication 100 MILLIGRAM(S): at 14:41

## 2019-11-06 RX ADMIN — Medication 3 MILLILITER(S): at 15:46

## 2019-11-06 RX ADMIN — Medication 500 MILLIGRAM(S): at 13:10

## 2019-11-06 RX ADMIN — Medication 3 MILLIGRAM(S): at 05:07

## 2019-11-06 RX ADMIN — Medication 3 MILLILITER(S): at 09:15

## 2019-11-06 RX ADMIN — Medication 110 MILLIGRAM(S): at 06:57

## 2019-11-06 RX ADMIN — Medication 100 MILLIGRAM(S): at 05:09

## 2019-11-06 RX ADMIN — MORPHINE SULFATE 60 MILLIGRAM(S): 50 CAPSULE, EXTENDED RELEASE ORAL at 05:37

## 2019-11-06 RX ADMIN — Medication 3 MILLIGRAM(S): at 17:37

## 2019-11-06 RX ADMIN — MEMANTINE HYDROCHLORIDE 10 MILLIGRAM(S): 10 TABLET ORAL at 17:34

## 2019-11-06 RX ADMIN — Medication 40 MILLIGRAM(S): at 21:37

## 2019-11-06 RX ADMIN — Medication 5 MILLILITER(S): at 17:40

## 2019-11-06 RX ADMIN — MEMANTINE HYDROCHLORIDE 10 MILLIGRAM(S): 10 TABLET ORAL at 05:09

## 2019-11-06 RX ADMIN — Medication 250 MILLIGRAM(S): at 11:42

## 2019-11-06 NOTE — PROGRESS NOTE ADULT - ASSESSMENT
Patient is a 70 yo F with PMHx of hypothyroidism, tobacco abuse, chronic back pain, anxiety, alzheimers dementia presents to ED via ambulance for eval SOB, confusion. In the ED she was found to have t max of 105.  ddx for fever includes HCAP.   she is currently afebrile and her white count is 10K  clinically she is feeling better   RVP is not detected   cultures in process     Plan   day # 2 of antibiotics   1.	continue with  Vancomycin, Cefepime and doxycyline   2.	follow up blood cultures   3.	continue supportive care as per ICU team

## 2019-11-06 NOTE — DIETITIAN INITIAL EVALUATION ADULT. - OTHER INFO
68 yo F with PMHx of hypothyroidism, tobacco abuse, chronic back pain, anxiety, ?alzheimer's dementia presents to ED via ambulance for eval SOB, confusion. Patient reports decreased po intake due to food at nursing facility PTA. Patient reports she was consuming cakes & cookies due to disliking food. Patient reports a good appetite. No issue chewing/swallowing. shin wound noted., MVI , Vit C , Zinc noted. (+) Bm (11/4). patient reports weight loss unknown amount , suggest Ensure enlive added BID. 70 yo F with PMHx of hypothyroidism, tobacco abuse, chronic back pain, anxiety, ?alzheimer's dementia presents to ED via ambulance for eval SOB, confusion. Patient reports decreased po intake due to food at nursing facility PTA. Patient reports she was consuming cakes & cookies due to disliking food. Patient reports a good appetite. No issue chewing/swallowing. shin wound noted., MVI , Vit C , Zinc noted. (+) Bm (11/4). patient reports weight loss unknown amount , suggest Ensure enlive added TID.

## 2019-11-06 NOTE — PROGRESS NOTE ADULT - SUBJECTIVE AND OBJECTIVE BOX
Follow-up Critical Care Progress Note  Chief Complaint : Sepsis      Pt seen and examined comfortable  off NIV  on RA  resp distress much improved  off pressors this am  Comfortable.        Allergies :Talwin (Unknown)      PAST MEDICAL & SURGICAL HISTORY:  Anxiety  HTN (hypertension)  Smoker  COPD (chronic obstructive pulmonary disease)  Wound  Alzheimer disease  Chronic back pain  Hypothyroid  History of arthroplasty of left hip      Medications:  MEDICATIONS  (STANDING):  albuterol/ipratropium for Nebulization 3 milliLiter(s) Nebulizer every 20 minutes  albuterol/ipratropium for Nebulization 3 milliLiter(s) Nebulizer every 6 hours  ALPRAZolam 3 milliGRAM(s) Oral two times a day  ascorbic acid 500 milliGRAM(s) Oral daily  cefepime   IVPB 1000 milliGRAM(s) IV Intermittent every 12 hours  docusate sodium Oral Tab/Cap - Peds 100 milliGRAM(s) Oral three times a day  donepezil 10 milliGRAM(s) Oral at bedtime  doxycycline IVPB      doxycycline IVPB 100 milliGRAM(s) IV Intermittent every 12 hours  levothyroxine 150 MICROGram(s) Oral daily  memantine 10 milliGRAM(s) Oral two times a day  methylPREDNISolone sodium succinate Injectable 40 milliGRAM(s) IV Push every 8 hours  morphine ER Tablet 60 milliGRAM(s) Oral two times a day  multivitamin 1 Tablet(s) Oral daily  phenylephrine    Infusion 0.4 MICROgram(s)/kG/Min (6.465 mL/Hr) IV Continuous <Continuous>  senna 2 Tablet(s) Oral at bedtime  vancomycin  IVPB 1000 milliGRAM(s) IV Intermittent daily  zinc sulfate 220 milliGRAM(s) Oral daily    MEDICATIONS  (PRN):      LABS:                        11.5   10. )-----------( 141      ( 2019 06:34 )             34.8         141  |  106  |  14  ----------------------------<  131<H>  3.4<L>   |  24  |  0.72    Ca    8.5      2019 06:34  Phos  3.3       Mg     1.3         TPro  7.4  /  Alb  2.9<L>  /  TBili  0.5  /  DBili  x   /  AST  48<H>  /  ALT  42  /  AlkPhos  112        WBC Trend  19 @ 06:34   -  10.09  19 @ 09:50   -  4.88    H/H Trend  19 @ 06:34   -  11.5 / 34.8  19 @ 09:50   -  12.2 / 37.9    Platelet Trend  19 @ 06:34   -  141<L>  19 @ 09:50   -  145<L>        PT/INR - ( 2019 09:50 )   PT: 13.3 sec;   INR: 1.18 ratio         PTT - ( 2019 09:50 )  PTT:32.9 sec  Urinalysis Basic - ( 2019 09:50 )    Color: Yellow / Appearance: Clear / S.015 / pH: x  Gluc: x / Ketone: Negative  / Bili: Negative / Urobili: Negative   Blood: x / Protein: Negative / Nitrite: Positive   Leuk Esterase: Trace / RBC: 0-4 /HPF / WBC 3-5 /HPF   Sq Epi: x / Non Sq Epi: Neg.-Few / Bacteria: Many /HPF      Lactic Acid Trend  19 @ 11:30   -   1.7  19 @ 09:50   -   2.5<H>          CULTURES: (if applicable)    Rapid RVP Result: NotDetec (19 @ 14:00)      ABG - ( 2019 05:50 )  pH, Arterial: 7.40  pH, Blood: x     /  pCO2: 40    /  pO2: 72    / HCO3: x     / Base Excess: x     /  SaO2: 94                CAPILLARY BLOOD GLUCOSE      POCT Blood Glucose.: 122 mg/dL (2019 05:42)      RADIOLOGY  US LE No DVT    Echo pending      VITALS:  T(C): 36.4 (19 @ 05:00), Max: 37.7 (19 @ 13:58)  T(F): 97.6 (19 @ 05:00), Max: 99.9 (19 @ 13:58)  HR: 89 (19 @ 09:15) (64 - 97)  BP: 98/84 (19 @ 09:00) (78/46 - 132/85)  BP(mean): 90 (19 @ 09:00) (5 - 101)  ABP: --  ABP(mean): --  RR: 16 (19 @ 09:00) (14 - 48)  SpO2: 97% (19 @ 09:15) (94% - 100%)  CVP(mm Hg): --  CVP(cm H2O): --    Ins and Outs     19 @ 07:01  -  19 @ 07:00  --------------------------------------------------------  IN: 1898.8 mL / OUT: 1451 mL / NET: 447.8 mL    19 @ 07:01  -  19 @ 11:56  --------------------------------------------------------  IN: 125 mL / OUT: 0 mL / NET: 125 mL        Height (cm): 157.48 (19 @ 09:37)  Weight (kg): 52 (19 @ 15:15)  BMI (kg/m2): 21 (19 @ 15:15)        I&O's Detail    2019 07:  -  2019 07:00  --------------------------------------------------------  IN:    lactated ringers.: 1625 mL    phenylephrine   Infusion: 73.8 mL    Solution: 100 mL    Solution: 100 mL  Total IN: 1898.8 mL    OUT:    Incontinent per Diaper: 1 mL    Voided: 1450 mL  Total OUT: 1451 mL    Total NET: 447.8 mL      2019 07:  -  2019 11:56  --------------------------------------------------------  IN:    lactated ringers.: 125 mL  Total IN: 125 mL    OUT:  Total OUT: 0 mL    Total NET: 125 mL

## 2019-11-06 NOTE — PROGRESS NOTE ADULT - SUBJECTIVE AND OBJECTIVE BOX
CC: f/u for pna    Patient reports feeling much better, she is sitting up in chair alert and awake she is answering questions    REVIEW OF SYSTEMS:  All other review of systems negative (Comprehensive ROS)      T(F): 97.8 (19 @ 10:00), Max: 99 (19 @ 00:00)  HR: 94 (19 @ 15:46)  BP: 133/69 (19 @ 15:00)  RR: 22 (19 @ 15:00)  SpO2: 98% (19 @ 15:46)  Wt(kg): --    PHYSICAL EXAM:  General: alert, no acute distress  Eyes:  anicteric, no conjunctival injection, no discharge  Oropharynx: no lesions or injection 	  Neck: supple, without adenopathy  Lungs: clear to auscultation  Heart: regular rate and rhythm; no murmur, rubs or gallops  Abdomen: soft, nondistended, nontender, without mass or organomegaly  Skin: no lesions  Extremities: no clubbing, cyanosis, or edema  Neurologic: alert, oriented, moves all extremities    LAB RESULTS:                        11.5   10. )-----------( 141      ( 2019 06:34 )             34.8         141  |  106  |  14  ----------------------------<  131<H>  3.4<L>   |  24  |  0.72    Ca    8.5      2019 06:34  Phos  3.3       Mg     1.3         TPro  7.4  /  Alb  2.9<L>  /  TBili  0.5  /  DBili  x   /  AST  48<H>  /  ALT  42  /  AlkPhos  112      LIVER FUNCTIONS - ( 2019 09:50 )  Alb: 2.9 g/dL / Pro: 7.4 g/dL / ALK PHOS: 112 U/L / ALT: 42 U/L / AST: 48 U/L / GGT: x           Urinalysis Basic - ( 2019 09:50 )    Color: Yellow / Appearance: Clear / S.015 / pH: x  Gluc: x / Ketone: Negative  / Bili: Negative / Urobili: Negative   Blood: x / Protein: Negative / Nitrite: Positive   Leuk Esterase: Trace / RBC: 0-4 /HPF / WBC 3-5 /HPF   Sq Epi: x / Non Sq Epi: Neg.-Few / Bacteria: Many /HPF      MICROBIOLOGY:  RECENT CULTURES:      RADIOLOGY REVIEWED:        Antimicrobials Day #    cefepime   IVPB 1000 milliGRAM(s) IV Intermittent every 12 hours  doxycycline IVPB      doxycycline IVPB 100 milliGRAM(s) IV Intermittent every 12 hours  vancomycin  IVPB 1000 milliGRAM(s) IV Intermittent daily    Other Medications Reviewed

## 2019-11-06 NOTE — PROGRESS NOTE ADULT - ASSESSMENT
Physical Examination:  GENERAL:               Alert, Oriented cachetic appearing  HEENT:                   No JVD, dry MM  PULM:                     diminished breath sounds throughout , no gross wheezing  CVS:                         S1, S2,  No Murmur  ABD:                        Soft, nondistended, nontender, normoactive bowel sounds,   DXT:                         Superficial wound about anterior lateral mid to distal third of RLE with small amount of overlying purulence. Trace edema to RLE. Nontender, No Cyanosis or Clubbing   NEURO:                  Alert, oriented, interactive, nonfocal, follows commands  PSYC:                      Calm, + Insight.    Assessment  1) Severe sepsis due to LLL PNA - Improving  2) Hypoxic respiratory failure / Respiratory distress requiring NIV  Resolved  3) Lung nodules vs Lung mass recently diagnosed  4) RLE cellulitis/wound s/p Skin graft  5) Possible acute on chronic COPD exacerbation , Ex smoker  6) Underlying Anxiety, hypothyroid,       PLAN:  - RA o2  - ABX as per ID Cefepime, Vanco, Doxy  - Continue Steroids, Nebs    will taper steroids in am  - lactic acid resolved  - Dr. Evans wound consult called for RLE chronic lesion  - biotine for dry mouth  - once stabilized pt will need PET scan to evaluate lung lesions r/o ca.   palliative care for [facility]/Health care proxy form      PMD:	Dr. Leonard 			                   Notified(Date):11/5/19  Family Updated: 		Marilin kimble - [phone]                                 Date:  11/5/19 pt states she can make medical decisions if unable to do so.      Sedation & Analgesia:	as above  Diet/Nutrition:		as tolerated  GI PPx:			ppi    DVT Ppx:		Hep sq    Activity:		          as tolerated       Head of Bed:               35-45  Glycemic Control:        n/a    Lines:  PIV    Restraints were deemed necessary to prevent removal of life-sustaining devices [  ] YES   [ x   ]  NO    Disposition: ICU monitoring     Goals of Care: Full     GOC: Full CODE - patient requests that in the event she is unable to make decisions for herself that, Marilin, her friend present at bedside be appointment he medical decision maker.

## 2019-11-07 LAB
-  AMIKACIN: SIGNIFICANT CHANGE UP
-  AMPICILLIN/SULBACTAM: SIGNIFICANT CHANGE UP
-  AMPICILLIN: SIGNIFICANT CHANGE UP
-  AZTREONAM: SIGNIFICANT CHANGE UP
-  CEFAZOLIN: SIGNIFICANT CHANGE UP
-  CEFEPIME: SIGNIFICANT CHANGE UP
-  CEFOXITIN: SIGNIFICANT CHANGE UP
-  CEFTRIAXONE: SIGNIFICANT CHANGE UP
-  CIPROFLOXACIN: SIGNIFICANT CHANGE UP
-  GENTAMICIN: SIGNIFICANT CHANGE UP
-  IMIPENEM: SIGNIFICANT CHANGE UP
-  LEVOFLOXACIN: SIGNIFICANT CHANGE UP
-  MEROPENEM: SIGNIFICANT CHANGE UP
-  NITROFURANTOIN: SIGNIFICANT CHANGE UP
-  PIPERACILLIN/TAZOBACTAM: SIGNIFICANT CHANGE UP
-  TIGECYCLINE: SIGNIFICANT CHANGE UP
-  TOBRAMYCIN: SIGNIFICANT CHANGE UP
-  TRIMETHOPRIM/SULFAMETHOXAZOLE: SIGNIFICANT CHANGE UP
ANION GAP SERPL CALC-SCNC: 9 MMOL/L — SIGNIFICANT CHANGE UP (ref 5–17)
BUN SERPL-MCNC: 18 MG/DL — SIGNIFICANT CHANGE UP (ref 7–23)
CALCIUM SERPL-MCNC: 8.7 MG/DL — SIGNIFICANT CHANGE UP (ref 8.4–10.5)
CHLORIDE SERPL-SCNC: 106 MMOL/L — SIGNIFICANT CHANGE UP (ref 96–108)
CO2 SERPL-SCNC: 27 MMOL/L — SIGNIFICANT CHANGE UP (ref 22–31)
CREAT SERPL-MCNC: 0.71 MG/DL — SIGNIFICANT CHANGE UP (ref 0.5–1.3)
CULTURE RESULTS: SIGNIFICANT CHANGE UP
GLUCOSE SERPL-MCNC: 175 MG/DL — HIGH (ref 70–99)
HCT VFR BLD CALC: 33.6 % — LOW (ref 34.5–45)
HGB BLD-MCNC: 11 G/DL — LOW (ref 11.5–15.5)
MCHC RBC-ENTMCNC: 27.8 PG — SIGNIFICANT CHANGE UP (ref 27–34)
MCHC RBC-ENTMCNC: 32.7 GM/DL — SIGNIFICANT CHANGE UP (ref 32–36)
MCV RBC AUTO: 84.8 FL — SIGNIFICANT CHANGE UP (ref 80–100)
METHOD TYPE: SIGNIFICANT CHANGE UP
NRBC # BLD: 0 /100 WBCS — SIGNIFICANT CHANGE UP (ref 0–0)
ORGANISM # SPEC MICROSCOPIC CNT: SIGNIFICANT CHANGE UP
ORGANISM # SPEC MICROSCOPIC CNT: SIGNIFICANT CHANGE UP
PLATELET # BLD AUTO: 128 K/UL — LOW (ref 150–400)
POTASSIUM SERPL-MCNC: 3.5 MMOL/L — SIGNIFICANT CHANGE UP (ref 3.5–5.3)
POTASSIUM SERPL-SCNC: 3.5 MMOL/L — SIGNIFICANT CHANGE UP (ref 3.5–5.3)
RBC # BLD: 3.96 M/UL — SIGNIFICANT CHANGE UP (ref 3.8–5.2)
RBC # FLD: 13.7 % — SIGNIFICANT CHANGE UP (ref 10.3–14.5)
SODIUM SERPL-SCNC: 142 MMOL/L — SIGNIFICANT CHANGE UP (ref 135–145)
SPECIMEN SOURCE: SIGNIFICANT CHANGE UP
WBC # BLD: 6.55 K/UL — SIGNIFICANT CHANGE UP (ref 3.8–10.5)
WBC # FLD AUTO: 6.55 K/UL — SIGNIFICANT CHANGE UP (ref 3.8–10.5)

## 2019-11-07 PROCEDURE — 71045 X-RAY EXAM CHEST 1 VIEW: CPT | Mod: 26

## 2019-11-07 PROCEDURE — 99233 SBSQ HOSP IP/OBS HIGH 50: CPT | Mod: GC

## 2019-11-07 RX ORDER — ENOXAPARIN SODIUM 100 MG/ML
40 INJECTION SUBCUTANEOUS DAILY
Refills: 0 | Status: DISCONTINUED | OUTPATIENT
Start: 2019-11-07 | End: 2019-11-09

## 2019-11-07 RX ADMIN — CEFEPIME 100 MILLIGRAM(S): 1 INJECTION, POWDER, FOR SOLUTION INTRAMUSCULAR; INTRAVENOUS at 18:20

## 2019-11-07 RX ADMIN — Medication 40 MILLIGRAM(S): at 06:00

## 2019-11-07 RX ADMIN — DONEPEZIL HYDROCHLORIDE 10 MILLIGRAM(S): 10 TABLET, FILM COATED ORAL at 22:02

## 2019-11-07 RX ADMIN — Medication 3 MILLIGRAM(S): at 05:57

## 2019-11-07 RX ADMIN — Medication 500 MILLIGRAM(S): at 11:18

## 2019-11-07 RX ADMIN — Medication 3 MILLILITER(S): at 21:26

## 2019-11-07 RX ADMIN — Medication 250 MILLIGRAM(S): at 11:18

## 2019-11-07 RX ADMIN — MORPHINE SULFATE 60 MILLIGRAM(S): 50 CAPSULE, EXTENDED RELEASE ORAL at 18:33

## 2019-11-07 RX ADMIN — Medication 3 MILLILITER(S): at 16:13

## 2019-11-07 RX ADMIN — Medication 110 MILLIGRAM(S): at 06:07

## 2019-11-07 RX ADMIN — Medication 110 MILLIGRAM(S): at 18:22

## 2019-11-07 RX ADMIN — CEFEPIME 100 MILLIGRAM(S): 1 INJECTION, POWDER, FOR SOLUTION INTRAMUSCULAR; INTRAVENOUS at 06:07

## 2019-11-07 RX ADMIN — MEMANTINE HYDROCHLORIDE 10 MILLIGRAM(S): 10 TABLET ORAL at 18:20

## 2019-11-07 RX ADMIN — MORPHINE SULFATE 60 MILLIGRAM(S): 50 CAPSULE, EXTENDED RELEASE ORAL at 06:44

## 2019-11-07 RX ADMIN — Medication 3 MILLILITER(S): at 09:41

## 2019-11-07 RX ADMIN — Medication 150 MICROGRAM(S): at 05:56

## 2019-11-07 RX ADMIN — ZINC SULFATE TAB 220 MG (50 MG ZINC EQUIVALENT) 220 MILLIGRAM(S): 220 (50 ZN) TAB at 11:18

## 2019-11-07 RX ADMIN — MEMANTINE HYDROCHLORIDE 10 MILLIGRAM(S): 10 TABLET ORAL at 05:56

## 2019-11-07 RX ADMIN — Medication 5 MILLILITER(S): at 18:23

## 2019-11-07 RX ADMIN — Medication 3 MILLIGRAM(S): at 22:00

## 2019-11-07 RX ADMIN — Medication 5 MILLILITER(S): at 11:19

## 2019-11-07 RX ADMIN — Medication 1 TABLET(S): at 11:18

## 2019-11-07 RX ADMIN — SENNA PLUS 2 TABLET(S): 8.6 TABLET ORAL at 22:02

## 2019-11-07 RX ADMIN — MORPHINE SULFATE 60 MILLIGRAM(S): 50 CAPSULE, EXTENDED RELEASE ORAL at 07:40

## 2019-11-07 RX ADMIN — ENOXAPARIN SODIUM 40 MILLIGRAM(S): 100 INJECTION SUBCUTANEOUS at 18:20

## 2019-11-07 RX ADMIN — Medication 5 MILLILITER(S): at 06:13

## 2019-11-07 NOTE — PROGRESS NOTE ADULT - SUBJECTIVE AND OBJECTIVE BOX
CC: f/u for pneumonia    Patient reports  she is ok, her ankle was swollen now better  REVIEW OF SYSTEMS:  All other review of systems negative (Comprehensive ROS)    Antimicrobials Day #  :3  cefepime   IVPB 1000 milliGRAM(s) IV Intermittent every 12 hours  doxycycline IVPB 100 milliGRAM(s) IV Intermittent every 12 hours  doxycycline IVPB        Other Medications Reviewed    T(F): 97.7 (11-07-19 @ 11:40), Max: 98.1 (11-06-19 @ 16:00)  HR: 98 (11-07-19 @ 11:40)  BP: 107/69 (11-07-19 @ 11:40)  RR: 18 (11-07-19 @ 11:40)  SpO2: 92% (11-07-19 @ 11:40)  Wt(kg): --    PHYSICAL EXAM:  General: alert, no acute distress  Eyes:  anicteric, no conjunctival injection, no discharge  Oropharynx: no lesions or injection 	  Neck: supple, without adenopathy  Lungs: basilar rales to auscultation  Heart: regular rate and rhythm; no murmur, rubs or gallops  Abdomen: soft, nondistended, nontender, without mass or organomegaly  Skin: no lesions  Extremities: no clubbing, cyanosis,. trace bilateral edema. clean wound right pretib  Neurologic: alert, oriented, moves all extremities    LAB RESULTS:                        11.0   6.55  )-----------( 128      ( 07 Nov 2019 13:33 )             33.6     11-07    142  |  106  |  18  ----------------------------<  175<H>  3.5   |  27  |  0.71    Ca    8.7      07 Nov 2019 13:33  Phos  3.3     11-06  Mg     1.3     11-06          MICROBIOLOGY:  RECENT CULTURES:  11-05 @ 09:50 .Blood Blood-Peripheral     No growth to date.    Urine Microscopic-Add On (NC) (11.05.19 @ 09:50)    Red Blood Cell - Urine: 0-4 /HPF    White Blood Cell - Urine: 3-5 /HPF    Bacteria: Many /HPF    Epithelial Cells: Neg.-Few          RADIOLOGY REVIEWED:    < from: CT Chest No Cont (11.01.19 @ 14:53) >  EXAM:  CT CHEST        PROCEDURE DATE:  11/01/2019          INTERPRETATION:  CLINICAL INFORMATION: Hyperinflated lungs. Follow-up to   chest radiograph.    COMPARISON: Chest radiographs, the latest dated 9/5/2019, CT abdomen   pelvis 4/10/2016.    PROCEDURE:   CT of the Chest was performed without intravenous contrast.  Sagittal and coronal reformats were performed.      FINDINGS:    LUNGS AND AIRWAYS: Patent central airways.  There is severe changes of   centrilobular emphysema with architectural distortion. There are   irregular nodular opacities in the left lower lobe with adjacent   tree-in-bud like opacities, most likely of infectious or in inflammatory   etiology. The largest of these nodular components in a segmental   distribution measures 1.1 x 0.7 cm on image #107/series 3. There is   another subpleural irregular nodule in the left lower lobe on image   #101/series 3 measuring 0.9 x 0.8 cm. The largest nodule is in the left   upper lobe peripherally, abutting the pleura and left main fissure and   contiguous with the left hilum, measuring 2.3 x 1.5 cm.     PLEURA: No pleural effusion.    MEDIASTINUM AND JEFERSON: No lymphadenopathy.    VESSELS: The ascending aorta is mildly ectatic measuring 3.7 x 3.8 cm at   its widest axial dimension. The main pulmonary arteries normal in caliber.    HEART: Heart size is normal. There is aortic valvular calcification. No   pericardial effusion.    CHEST WALL AND LOWER NECK: The thyroid gland is small in size. There is   no supraclavicular or axillary lymphadenopathy. There are however several   subcentimeter bilateral axillary lymph nodes.    VISUALIZED UPPER ABDOMEN: The adrenal glands are within normal limits.   The imaged portions of the unenhanced liver and kidneys are within normal   limits. The spleen is enlarged.    BONES: Multilevel degenerative change of the thoracic spine with   osteophytes, degenerative disc disease and facet joint arthropathy.    IMPRESSION:     Irregular solid nodules in the left lung, the largest inthe left upper   lobe measures 2.3 x 1.5 cm, and is contiguous with the left hilum. In the   setting of emphysema, malignancy cannot be excluded and further   assessment is recommended.    Additional smaller irregular nodules in the left lower lobe, some with   tree-in-bud distribution, these may be of infectious or inflammatory   etiology.    Justyn Vaughn at Dr. Zhao's office stated that she will bring the report   to his attention.    An email with these findings was sent to Dr. JEAN ZHAO 11/1/2019   3:33 PM by Dr. Seymour with read back confirmation. Spoke to Dr. Zhao   at approximately 3:38 PM.        < end of copied text >    < from: Xray Chest 1 View- PORTABLE-Routine (11.07.19 @ 09:20) >  EXAM:  XR CHEST PORTABLE ROUTINE 1V      PROCEDURE DATE:  11/07/2019        INTERPRETATION:  Clinical indication:  eval pna fever    Technique: XR CHEST portable AP    Comparison:11/5/2019.    Findings:  Lines: None    Heart/Mediastinum/Lungs: The heart size is normal. There is patchy   airspace disease in the left lower lobe, compatible with pneumonia. The   right lung remains relatively clear.There are no pleural effusions.    Impression:    As above.        < end of copied text >  Assessment:  Patient with dementia admitted with very high fever, respiratory failure, left sided infiltrate. Suspect pneumonia source of sepsis. She has underlying lung nodule and some tree in bud raising concern for cancer, aspiration or damian  Plan:  continue doxy and cefepime day 3/7  for outpt pet per dr zhao for lung nodules  supportive care

## 2019-11-07 NOTE — PROGRESS NOTE ADULT - ASSESSMENT
68 yo F with PMHx of hypothyroidism, tobacco abuse, chronic back pain, anxiety, ?alzheimers dementia p/w sepsis 2/2 LLL PNA.    # Severe sepsis due to LLL PNA - improving   Cont. IV Cefepime,  Doxy, d/c Vanco  taper stetoids to oral, continue Nebs.   ID consult appreciated: continue doxy and cefepime day 3/7    #Acute hypoxic respiratory failure requiring NIV - resolved  Pulmonary consult appreciated     # Lung nodules vs Lung mass recently diagnosed   high suspicion for malignancy   Outpatient PET scan to evaluate lung lesions, r/o lung CA  Pulmonary consult appreciated     #LE cellulitis/wound s/p Skin graft  Dr. Evans wound follow up called for RLE chronic lesion -apply xeroform gauze with protective dressing,   change every other day, apply ace bandage when out of bed    #Acute on chronic COPD exacerbation/Ex smoker  Steroids po tapering, cont, nebs    #Hypothyroidism - cont. synthroid     #Dementia/anxiety - cont., aricept  Psych consult pending     #DVT prophylaxis - on lovenox

## 2019-11-07 NOTE — PROGRESS NOTE ADULT - SUBJECTIVE AND OBJECTIVE BOX
Patient is a 69y old  Female who presents with a chief complaint of PNA (2019 14:05)      Patient seen and examined at bedside, no events overnight, in no acute distress.     ALLERGIES:  Talwin (Unknown)    MEDICATIONS:  albuterol/ipratropium for Nebulization 3 milliLiter(s) Nebulizer every 20 minutes  albuterol/ipratropium for Nebulization 3 milliLiter(s) Nebulizer every 6 hours  ALPRAZolam 3 milliGRAM(s) Oral two times a day  ascorbic acid 500 milliGRAM(s) Oral daily  Biotene Dry Mouth Oral Rinse 5 milliLiter(s) Swish and Spit four times a day  cefepime   IVPB 1000 milliGRAM(s) IV Intermittent every 12 hours  docusate sodium Oral Tab/Cap - Peds 100 milliGRAM(s) Oral three times a day  donepezil 10 milliGRAM(s) Oral at bedtime  doxycycline IVPB 100 milliGRAM(s) IV Intermittent every 12 hours  doxycycline IVPB      enoxaparin Injectable 40 milliGRAM(s) SubCutaneous daily  levothyroxine 150 MICROGram(s) Oral daily  memantine 10 milliGRAM(s) Oral two times a day  morphine ER Tablet 60 milliGRAM(s) Oral two times a day  multivitamin 1 Tablet(s) Oral daily  predniSONE   Tablet   Oral   predniSONE   Tablet 40 milliGRAM(s) Oral daily  senna 2 Tablet(s) Oral at bedtime  zinc sulfate 220 milliGRAM(s) Oral daily    Vital Signs Last 24 Hrs  T(C): 36.5 (2019 11:40), Max: 36.7 (2019 16:00)  T(F): 97.7 (2019 11:40), Max: 98.1 (2019 16:00)  HR: 100 (2019 14:07) (87 - 111)  BP: 107/69 (2019 11:40) (98/73 - 113/62)  BP(mean): 82 (2019 19:00) (82 - 114)  RR: 18 (2019 11:40) (18 - 53)  SpO2: 99% (2019 14:07) (90% - 100%)  I&O's Summary    2019 07:01  -  2019 07:00  --------------------------------------------------------  IN: 125 mL / OUT: 0 mL / NET: 125 mL    2019 07:01  -  2019 15:29  --------------------------------------------------------  IN: 480 mL / OUT: 0 mL / NET: 480 mL          PAST MEDICAL & SURGICAL HISTORY:  Anxiety  HTN (hypertension)  Smoker  COPD (chronic obstructive pulmonary disease)  Wound  Alzheimer disease  Chronic back pain  Hypothyroid  History of arthroplasty of left hip      Home Medications:  Aricept 10 mg oral tablet: 1 tab(s) orally once a day (at bedtime) (05 Sep 2019 17:32)  ascorbic acid 500 mg oral tablet: 1 tab(s) orally once a day (09 Sep 2019 12:15)  docusate sodium 100 mg oral capsule: 1 cap(s) orally 3 times a day (09 Sep 2019 12:14)  hydroCHLOROthiazide 12.5 mg oral capsule: 1 cap(s) orally once a day (05 Sep 2019 17:32)  levothyroxine 150 mcg (0.15 mg) oral tablet: 1 tab(s) orally once a day (05 Sep 2019 17:32)  memantine 10 mg oral tablet: 1 tab(s) orally 2 times a day (05 Sep 2019 17:32)  morphine 60 mg/12 hours oral tablet, extended release: 1 tab(s) orally 2 times a day (05 Sep 2019 17:32)  Multiple Vitamins oral tablet: 1 tab(s) orally once a day (09 Sep 2019 12:14)  nicotine 21 mg/24 hr transdermal film, extended release: 1 patch transdermal once a day (05 Sep 2019 17:32)  senna oral tablet: 2 tab(s) orally once a day (at bedtime) (09 Sep 2019 12:14)  Ventolin HFA 90 mcg/inh inhalation aerosol: 2 puff(s) inhaled 4 times a day (05 Sep 2019 17:32)  Xanax 2 mg oral tablet: 1.5 tab(s) orally 2 times a day (05 Sep 2019 17:32)  zinc sulfate 220 mg oral capsule: 1 cap(s) orally once a day (09 Sep 2019 12:14)      PHYSICAL EXAM:  General: NAD, A/O x3  ENT: MMM, no thrush  Neck: Supple, No JVD  Lungs: Diminished breath sounds throughout   Cardio: RRR, S1/S2, No murmurs  Abdomen: Soft, Nontender, Nondistended; Bowel sounds present  Extremities: Superficial wound about anterior lateral mid to distal third of RLE with small amount of overlying purulence.   Trace edema to RLE. Nontender, No Cyanosis or Clubbing     LABS:                        11.0   6.55  )-----------( 128      ( 2019 13:33 )             33.6         142  |  106  |  18  ----------------------------<  175  3.5   |  27  |  0.71    Ca    8.7      2019 13:33  Phos  3.3       Mg     1.3         TPro  7.4  /  Alb  2.9  /  TBili  0.5  /  DBili  x   /  AST  48  /  ALT  42  /  AlkPhos  112      PT/INR - ( 2019 09:50 )   PT: 13.3 sec;   INR: 1.18 ratio         PTT - ( 2019 09:50 )  PTT:32.9 sec  Lactate, Blood: 1.7 mmol/L ( @ 11:30)  Lactate, Blood: 2.5 mmol/L ( @ 09:50)          ABG - ( 2019 05:50 )  pH, Arterial: 7.40  pH, Blood: x     /  pCO2: 40    /  pO2: 72    / HCO3: x     / Base Excess: x     /  SaO2: 94                          Urinalysis Basic - ( 2019 09:50 )    Color: Yellow / Appearance: Clear / S.015 / pH: x  Gluc: x / Ketone: Negative  / Bili: Negative / Urobili: Negative   Blood: x / Protein: Negative / Nitrite: Positive   Leuk Esterase: Trace / RBC: 0-4 /HPF / WBC 3-5 /HPF   Sq Epi: x / Non Sq Epi: Neg.-Few / Bacteria: Many /HPF        Culture - Urine (collected 2019 09:50)  Source: .Urine Clean Catch (Midstream)  Preliminary Report (2019 17:37):    >100,000 CFU/ml Gram Negative Rods    Culture - Blood (collected 2019 09:50)  Source: .Blood Blood-Peripheral  Preliminary Report (2019 18:01):    No growth to date.    Culture - Blood (collected 2019 09:50)  Source: .Blood Blood-Peripheral  Preliminary Report (2019 18:01):    No growth to date.        RADIOLOGY & ADDITIONAL TESTS: < from: Xray Chest 1 View- PORTABLE-Routine (19 @ 09:20) >    INTERPRETATION:  Clinical indication:  eval pna fever    Technique: XR CHEST portable AP    Comparison:2019.    Findings:  Lines: None    Heart/Mediastinum/Lungs: The heart size is normal. There is patchy   airspace disease in the left lower lobe, compatible with pneumonia. The   right lung remains relatively clear.There are no pleural effusions.    Impression:    As above.    < end of copied text >    < from: US Duplex Venous Lower Ext Complete, Bilateral (19 @ 17:51) >  IMPRESSION:     No evidence of deep venous thrombosis in either lower extremity.    < end of copied text >      Care Discussed with Consultants/Other Providers: Hospitalist

## 2019-11-07 NOTE — GOALS OF CARE CONVERSATION - ADVANCED CARE PLANNING - CONVERSATION DETAILS
Met with patient at bedside yesterday to discuss if she wanted to designate anyone as her HCP. She was unsure at the time and said she had no family. Asked to leave blank HCP form and she would think about it. Returned to patient again this morning, and she again declined to designate a HCP. States she is going home today, and will think about it at home.

## 2019-11-07 NOTE — PROGRESS NOTE ADULT - SUBJECTIVE AND OBJECTIVE BOX
Follow-up Critical Care Progress Note  Chief Complaint : Sepsis      pt transferred out of ICU overnight  comfortable  does not want to go back to [facility].         Allergies :Talwin (Unknown)      PAST MEDICAL & SURGICAL HISTORY:  Anxiety  HTN (hypertension)  Smoker  COPD (chronic obstructive pulmonary disease)  Wound  Alzheimer disease  Chronic back pain  Hypothyroid  History of arthroplasty of left hip      Medications:  MEDICATIONS  (STANDING):  albuterol/ipratropium for Nebulization 3 milliLiter(s) Nebulizer every 20 minutes  albuterol/ipratropium for Nebulization 3 milliLiter(s) Nebulizer every 6 hours  ALPRAZolam 3 milliGRAM(s) Oral two times a day  ascorbic acid 500 milliGRAM(s) Oral daily  Biotene Dry Mouth Oral Rinse 5 milliLiter(s) Swish and Spit four times a day  cefepime   IVPB 1000 milliGRAM(s) IV Intermittent every 12 hours  docusate sodium Oral Tab/Cap - Peds 100 milliGRAM(s) Oral three times a day  donepezil 10 milliGRAM(s) Oral at bedtime  doxycycline IVPB 100 milliGRAM(s) IV Intermittent every 12 hours  doxycycline IVPB      levothyroxine 150 MICROGram(s) Oral daily  memantine 10 milliGRAM(s) Oral two times a day  methylPREDNISolone sodium succinate Injectable 40 milliGRAM(s) IV Push every 8 hours  morphine ER Tablet 60 milliGRAM(s) Oral two times a day  multivitamin 1 Tablet(s) Oral daily  senna 2 Tablet(s) Oral at bedtime  vancomycin  IVPB 1000 milliGRAM(s) IV Intermittent daily  zinc sulfate 220 milliGRAM(s) Oral daily    MEDICATIONS  (PRN):      LABS:                        11.5   10.09 )-----------( 141      ( 06 Nov 2019 06:34 )             34.8     11-06    141  |  106  |  14  ----------------------------<  131<H>  3.4<L>   |  24  |  0.72    Ca    8.5      06 Nov 2019 06:34  Phos  3.3     11-06  Mg     1.3     11-06                          CULTURES: (if applicable)    Culture - Urine (collected 11-05-19 @ 09:50)  Source: .Urine Clean Catch (Midstream)  Preliminary Report (11-06-19 @ 17:37):    >100,000 CFU/ml Gram Negative Rods    Culture - Blood (collected 11-05-19 @ 09:50)  Source: .Blood Blood-Peripheral  Preliminary Report (11-06-19 @ 18:01):    No growth to date.    Culture - Blood (collected 11-05-19 @ 09:50)  Source: .Blood Blood-Peripheral  Preliminary Report (11-06-19 @ 18:01):    No growth to date.      Rapid RVP Result: NotDetec (11-05-19 @ 14:00)      ABG - ( 06 Nov 2019 05:50 )  pH, Arterial: 7.40  pH, Blood: x     /  pCO2: 40    /  pO2: 72    / HCO3: x     / Base Excess: x     /  SaO2: 94                CAPILLARY BLOOD GLUCOSE      POCT Blood Glucose.: 122 mg/dL (06 Nov 2019 05:42)      RADIOLOGY  CXR:  < from: Xray Chest 1 View- PORTABLE-Routine (11.07.19 @ 09:20) >    INTERPRETATION:  Clinical indication:  eval pna fever    Technique: XR CHEST portable AP    Comparison:11/5/2019.    Findings:  Lines: None    Heart/Mediastinum/Lungs: The heart size is normal. There is patchy   airspace disease in the left lower lobe, compatible with pneumonia. The   right lung remains relatively clear.There are no pleural effusions.    Impression:    As above.        < end of copied text >          VITALS:  T(C): 36.4 (11-07-19 @ 05:13), Max: 36.7 (11-06-19 @ 16:00)  T(F): 97.6 (11-07-19 @ 05:13), Max: 98.1 (11-06-19 @ 16:00)  HR: 87 (11-07-19 @ 09:42) (87 - 111)  BP: 113/62 (11-07-19 @ 05:13) (98/73 - 136/92)  BP(mean): 82 (11-06-19 @ 19:00) (77 - 114)  ABP: --  ABP(mean): --  RR: 19 (11-07-19 @ 05:13) (19 - 53)  SpO2: 96% (11-07-19 @ 09:42) (90% - 100%)  CVP(mm Hg): --  CVP(cm H2O): --    Ins and Outs     11-06-19 @ 07:01  -  11-07-19 @ 07:00  --------------------------------------------------------  IN: 125 mL / OUT: 0 mL / NET: 125 mL    11-07-19 @ 07:01  -  11-07-19 @ 11:28  --------------------------------------------------------  IN: 240 mL / OUT: 0 mL / NET: 240 mL        Height (cm): 157.48 (11-05-19 @ 09:37)  Weight (kg): 52 (11-05-19 @ 15:15)  BMI (kg/m2): 21 (11-05-19 @ 15:15)        I&O's Detail    06 Nov 2019 07:01  -  07 Nov 2019 07:00  --------------------------------------------------------  IN:    lactated ringers.: 125 mL  Total IN: 125 mL    OUT:  Total OUT: 0 mL    Total NET: 125 mL      07 Nov 2019 07:01  -  07 Nov 2019 11:28  --------------------------------------------------------  IN:    Oral Fluid: 240 mL  Total IN: 240 mL    OUT:  Total OUT: 0 mL    Total NET: 240 mL

## 2019-11-07 NOTE — PROGRESS NOTE ADULT - ATTENDING COMMENTS
I have personally seen and examined patient on the above date.  I discussed the case with KANA Kelley and I agree with findings and plan as detailed per note above, which I have amended where appropriate.      Follow-up to see if okay to change to PO antibiotics regimen in AM and then d/c planning    Outpatient PET-CT scan to assess lung lesions, may also need bronchoscopy with biopsy down the road - f/u Dr. Gudino I have personally seen and examined patient on the above date.  I discussed the case with KANA Kelley and I agree with findings and plan as detailed per note above, which I have amended where appropriate.      Follow-up to see if okay to change to PO antibiotics regimen in AM and then d/c planning    We are treating this patient for complex pneumonia as patient was recently hospitalized.     Outpatient PET-CT scan to assess lung lesions, may also need bronchoscopy with biopsy down the road - f/u Dr. Gudino

## 2019-11-08 LAB
ANION GAP SERPL CALC-SCNC: 6 MMOL/L — SIGNIFICANT CHANGE UP (ref 5–17)
BUN SERPL-MCNC: 21 MG/DL — SIGNIFICANT CHANGE UP (ref 7–23)
CALCIUM SERPL-MCNC: 8.5 MG/DL — SIGNIFICANT CHANGE UP (ref 8.4–10.5)
CHLORIDE SERPL-SCNC: 107 MMOL/L — SIGNIFICANT CHANGE UP (ref 96–108)
CO2 SERPL-SCNC: 30 MMOL/L — SIGNIFICANT CHANGE UP (ref 22–31)
CREAT SERPL-MCNC: 0.67 MG/DL — SIGNIFICANT CHANGE UP (ref 0.5–1.3)
GLUCOSE SERPL-MCNC: 84 MG/DL — SIGNIFICANT CHANGE UP (ref 70–99)
HCT VFR BLD CALC: 36.7 % — SIGNIFICANT CHANGE UP (ref 34.5–45)
HGB BLD-MCNC: 11.6 G/DL — SIGNIFICANT CHANGE UP (ref 11.5–15.5)
MCHC RBC-ENTMCNC: 27.2 PG — SIGNIFICANT CHANGE UP (ref 27–34)
MCHC RBC-ENTMCNC: 31.6 GM/DL — LOW (ref 32–36)
MCV RBC AUTO: 85.9 FL — SIGNIFICANT CHANGE UP (ref 80–100)
NRBC # BLD: 0 /100 WBCS — SIGNIFICANT CHANGE UP (ref 0–0)
PLATELET # BLD AUTO: 145 K/UL — LOW (ref 150–400)
POTASSIUM SERPL-MCNC: 3.6 MMOL/L — SIGNIFICANT CHANGE UP (ref 3.5–5.3)
POTASSIUM SERPL-SCNC: 3.6 MMOL/L — SIGNIFICANT CHANGE UP (ref 3.5–5.3)
RBC # BLD: 4.27 M/UL — SIGNIFICANT CHANGE UP (ref 3.8–5.2)
RBC # FLD: 14 % — SIGNIFICANT CHANGE UP (ref 10.3–14.5)
SODIUM SERPL-SCNC: 143 MMOL/L — SIGNIFICANT CHANGE UP (ref 135–145)
WBC # BLD: 6.26 K/UL — SIGNIFICANT CHANGE UP (ref 3.8–10.5)
WBC # FLD AUTO: 6.26 K/UL — SIGNIFICANT CHANGE UP (ref 3.8–10.5)

## 2019-11-08 PROCEDURE — 99233 SBSQ HOSP IP/OBS HIGH 50: CPT

## 2019-11-08 RX ADMIN — Medication 1 TABLET(S): at 12:30

## 2019-11-08 RX ADMIN — Medication 3 MILLILITER(S): at 15:30

## 2019-11-08 RX ADMIN — Medication 5 MILLILITER(S): at 06:07

## 2019-11-08 RX ADMIN — Medication 110 MILLIGRAM(S): at 06:10

## 2019-11-08 RX ADMIN — MEMANTINE HYDROCHLORIDE 10 MILLIGRAM(S): 10 TABLET ORAL at 17:38

## 2019-11-08 RX ADMIN — MEMANTINE HYDROCHLORIDE 10 MILLIGRAM(S): 10 TABLET ORAL at 06:11

## 2019-11-08 RX ADMIN — DONEPEZIL HYDROCHLORIDE 10 MILLIGRAM(S): 10 TABLET, FILM COATED ORAL at 22:39

## 2019-11-08 RX ADMIN — Medication 3 MILLIGRAM(S): at 22:40

## 2019-11-08 RX ADMIN — MORPHINE SULFATE 60 MILLIGRAM(S): 50 CAPSULE, EXTENDED RELEASE ORAL at 10:39

## 2019-11-08 RX ADMIN — CEFEPIME 100 MILLIGRAM(S): 1 INJECTION, POWDER, FOR SOLUTION INTRAMUSCULAR; INTRAVENOUS at 06:10

## 2019-11-08 RX ADMIN — Medication 5 MILLILITER(S): at 17:59

## 2019-11-08 RX ADMIN — Medication 3 MILLILITER(S): at 09:12

## 2019-11-08 RX ADMIN — ENOXAPARIN SODIUM 40 MILLIGRAM(S): 100 INJECTION SUBCUTANEOUS at 12:30

## 2019-11-08 RX ADMIN — MORPHINE SULFATE 60 MILLIGRAM(S): 50 CAPSULE, EXTENDED RELEASE ORAL at 09:39

## 2019-11-08 RX ADMIN — Medication 500 MILLIGRAM(S): at 12:30

## 2019-11-08 RX ADMIN — MORPHINE SULFATE 60 MILLIGRAM(S): 50 CAPSULE, EXTENDED RELEASE ORAL at 23:40

## 2019-11-08 RX ADMIN — Medication 110 MILLIGRAM(S): at 18:41

## 2019-11-08 RX ADMIN — ZINC SULFATE TAB 220 MG (50 MG ZINC EQUIVALENT) 220 MILLIGRAM(S): 220 (50 ZN) TAB at 12:30

## 2019-11-08 RX ADMIN — Medication 150 MICROGRAM(S): at 06:11

## 2019-11-08 RX ADMIN — Medication 5 MILLILITER(S): at 12:31

## 2019-11-08 RX ADMIN — CEFEPIME 100 MILLIGRAM(S): 1 INJECTION, POWDER, FOR SOLUTION INTRAMUSCULAR; INTRAVENOUS at 17:37

## 2019-11-08 RX ADMIN — MORPHINE SULFATE 60 MILLIGRAM(S): 50 CAPSULE, EXTENDED RELEASE ORAL at 22:40

## 2019-11-08 RX ADMIN — Medication 3 MILLILITER(S): at 21:39

## 2019-11-08 RX ADMIN — Medication 40 MILLIGRAM(S): at 06:11

## 2019-11-08 RX ADMIN — Medication 3 MILLIGRAM(S): at 06:16

## 2019-11-08 NOTE — PROGRESS NOTE ADULT - ASSESSMENT
Healing new traumatic wound right leg.  Healed skin graft (9/5/19) right leg.  -continue every other day xeroform dressing change  Pneumonia, CT chest findings- management as per Dr. Gudino

## 2019-11-08 NOTE — PROGRESS NOTE ADULT - SUBJECTIVE AND OBJECTIVE BOX
Patient is a 69y old  Female who presents with a chief complaint of PNA (08 Nov 2019 13:18)      Interval HPI/ Overnight events: No events overnight     Patient seen and examined at bedside, complained that she is not getting her morphine on time and wasn't getting her Xanax  together with the morphine    REVIEW OF SYSTEMS:    CONSTITUTIONAL: No weakness, fevers or chills  EYES/ENT: No visual changes;  No vertigo or throat pain   NECK: No pain or stiffness  RESPIRATORY: No cough, wheezing, hemoptysis; No shortness of breath  CARDIOVASCULAR: No chest pain or palpitations  GASTROINTESTINAL: No abdominal or epigastric pain. No nausea, vomiting, or hematemesis; No diarrhea or constipation. No melena or hematochezia.  GENITOURINARY: No dysuria, frequency or hematuria  NEUROLOGICAL: No numbness or weakness  SKIN: No itching, burning, rashes, or lesions   MSK: no joint pain, no joint swelling  All other review of systems is negative unless indicated above.      ALLERGIES:  Talwin (Unknown)    MEDICATIONS  (STANDING):  albuterol/ipratropium for Nebulization 3 milliLiter(s) Nebulizer every 20 minutes  albuterol/ipratropium for Nebulization 3 milliLiter(s) Nebulizer every 6 hours  ALPRAZolam 3 milliGRAM(s) Oral two times a day  ascorbic acid 500 milliGRAM(s) Oral daily  Biotene Dry Mouth Oral Rinse 5 milliLiter(s) Swish and Spit four times a day  cefepime   IVPB 1000 milliGRAM(s) IV Intermittent every 12 hours  docusate sodium Oral Tab/Cap - Peds 100 milliGRAM(s) Oral three times a day  donepezil 10 milliGRAM(s) Oral at bedtime  doxycycline IVPB 100 milliGRAM(s) IV Intermittent every 12 hours  doxycycline IVPB      enoxaparin Injectable 40 milliGRAM(s) SubCutaneous daily  levothyroxine 150 MICROGram(s) Oral daily  memantine 10 milliGRAM(s) Oral two times a day  morphine ER Tablet 60 milliGRAM(s) Oral two times a day  multivitamin 1 Tablet(s) Oral daily  predniSONE   Tablet   Oral   predniSONE   Tablet 40 milliGRAM(s) Oral daily  senna 2 Tablet(s) Oral at bedtime  zinc sulfate 220 milliGRAM(s) Oral daily    MEDICATIONS  (PRN):    Vital Signs Last 24 Hrs  T(F): 97.6 (08 Nov 2019 11:14), Max: 98.4 (07 Nov 2019 16:09)  HR: 74 (08 Nov 2019 15:32) (68 - 94)  BP: 117/71 (08 Nov 2019 11:14) (117/71 - 142/75)  RR: 16 (08 Nov 2019 11:14) (16 - 17)  SpO2: 97% (08 Nov 2019 15:32) (93% - 97%)  I&O's Summary    07 Nov 2019 07:01  -  08 Nov 2019 07:00  --------------------------------------------------------  IN: 480 mL / OUT: 0 mL / NET: 480 mL    08 Nov 2019 07:01  -  08 Nov 2019 15:41  --------------------------------------------------------  IN: 240 mL / OUT: 0 mL / NET: 240 mL        PHYSICAL EXAM:  General: NAD, well dressed, well nourished  Head: NT/AC  ENT: MMM, no oropharyngeal erythema or exudates  Neck: Supple, No JVD  Lungs: Clear to auscultation bilaterally, no wheezing, rales, or rhonchi, no accessory muscle use  Cardio: RRR, normal S1/S2, No M/R/G  Abdomen: Normoactive BS, Abdomen soft, nontender, nondistended; no organomegaly  Extremities: No calf tenderness, no clubbing or  cyanosis  Vascular: no LE edema  Lymph: no cervical LAD  Skin: warm and dry  Neuro: AAOx3, answers all questions appropriately, moves all extremities    LABS:                        11.6   6.26  )-----------( 145      ( 08 Nov 2019 06:05 )             36.7     11-08    143  |  107  |  21  ----------------------------<  84  3.6   |  30  |  0.67    Ca    8.5      08 Nov 2019 06:05  Phos  3.3     11-06  Mg     1.3     11-06      eGFR if Non African American: 91 mL/min/1.73M2 (11-08-19 @ 06:05)  eGFR if African American: 106 mL/min/1.73M2 (11-08-19 @ 06:05)                  ABG - ( 06 Nov 2019 05:50 )  pH, Arterial: 7.40  pH, Blood: x     /  pCO2: 40    /  pO2: 72    / HCO3: x     / Base Excess: x     /  SaO2: 94                              Culture - Urine (collected 05 Nov 2019 09:50)  Source: .Urine Clean Catch (Midstream)  Final Report (07 Nov 2019 17:59):    >100,000 CFU/ml Escherichia coli  Organism: Escherichia coli (07 Nov 2019 17:59)  Organism: Escherichia coli (07 Nov 2019 17:59)      -  Amikacin: S <=16      -  Ampicillin: S <=8 These ampicillin results predict results for amoxicillin      -  Ampicillin/Sulbactam: S <=8/4 Enterobacter, Citrobacter, and Serratia may develop resistance during prolonged therapy (3-4 days)      -  Aztreonam: S <=4      -  Cefazolin: S <=8 (MIC_CL_COM_ENTERIC_CEFAZU) For uncomplicated UTI with K. pneumoniae, E. coli, or P. mirablis: LENORE <=16 is sensitive and LENORE >=32 is resistant. This also predicts results for oral agents cefaclor, cefdinir, cefpodoxime, cefprozil, cefuroxime axetil, cephalexin and locarbef for uncomplicated UTI. Note that some isolates may be susceptible to these agents while testing resistant to cefazolin.      -  Cefepime: S <=4      -  Cefoxitin: S <=8      -  Ceftriaxone: S <=1 Enterobacter, Citrobacter, and Serratia may develop resistance during prolonged therapy      -  Ciprofloxacin: S <=1      -  Gentamicin: S <=4      -  Imipenem: S <=1      -  Levofloxacin: S <=2      -  Meropenem: S <=1      -  Nitrofurantoin: S <=32 Should not be used to treat pyelonephritis      -  Piperacillin/Tazobactam: S <=16      -  Tigecycline: S <=2      -  Tobramycin: S <=4      -  Trimethoprim/Sulfamethoxazole: S <=2/38      Method Type: LENORE    Culture - Blood (collected 05 Nov 2019 09:50)  Source: .Blood Blood-Peripheral  Preliminary Report (06 Nov 2019 18:01):    No growth to date.    Culture - Blood (collected 05 Nov 2019 09:50)  Source: .Blood Blood-Peripheral  Preliminary Report (06 Nov 2019 18:01):    No growth to date.        RADIOLOGY & ADDITIONAL TESTS:    Care Discussed with Consultants/Other Providers: Patient is a 69y old  Female who presents with a chief complaint of PNA (08 Nov 2019 13:18)      Interval HPI/ Overnight events: No events overnight     Patient seen and examined at bedside, complained that she is not getting her morphine on time and wasn't getting her Xanax  together with the morphine    REVIEW OF SYSTEMS:    CONSTITUTIONAL: No weakness, fevers or chills  EYES/ENT: No visual changes;  No vertigo or throat pain   NECK: No pain or stiffness  RESPIRATORY: No cough, wheezing, hemoptysis; No shortness of breath  CARDIOVASCULAR: No chest pain or palpitations  GASTROINTESTINAL: No abdominal or epigastric pain. No nausea, vomiting, or hematemesis; No diarrhea or constipation. No melena or hematochezia.  GENITOURINARY: No dysuria, frequency or hematuria  NEUROLOGICAL: No numbness or weakness  SKIN: No itching, burning, rashes, or lesions   MSK: no joint pain, no joint swelling  All other review of systems is negative unless indicated above.      ALLERGIES:  Talwin (Unknown)    MEDICATIONS  (STANDING):  albuterol/ipratropium for Nebulization 3 milliLiter(s) Nebulizer every 20 minutes  albuterol/ipratropium for Nebulization 3 milliLiter(s) Nebulizer every 6 hours  ALPRAZolam 3 milliGRAM(s) Oral two times a day  ascorbic acid 500 milliGRAM(s) Oral daily  Biotene Dry Mouth Oral Rinse 5 milliLiter(s) Swish and Spit four times a day  cefepime   IVPB 1000 milliGRAM(s) IV Intermittent every 12 hours  docusate sodium Oral Tab/Cap - Peds 100 milliGRAM(s) Oral three times a day  donepezil 10 milliGRAM(s) Oral at bedtime  doxycycline IVPB 100 milliGRAM(s) IV Intermittent every 12 hours  doxycycline IVPB      enoxaparin Injectable 40 milliGRAM(s) SubCutaneous daily  levothyroxine 150 MICROGram(s) Oral daily  memantine 10 milliGRAM(s) Oral two times a day  morphine ER Tablet 60 milliGRAM(s) Oral two times a day  multivitamin 1 Tablet(s) Oral daily  predniSONE   Tablet   Oral   predniSONE   Tablet 40 milliGRAM(s) Oral daily  senna 2 Tablet(s) Oral at bedtime  zinc sulfate 220 milliGRAM(s) Oral daily    MEDICATIONS  (PRN):    Vital Signs Last 24 Hrs  T(F): 97.6 (08 Nov 2019 11:14), Max: 98.4 (07 Nov 2019 16:09)  HR: 74 (08 Nov 2019 15:32) (68 - 94)  BP: 117/71 (08 Nov 2019 11:14) (117/71 - 142/75)  RR: 16 (08 Nov 2019 11:14) (16 - 17)  SpO2: 97% (08 Nov 2019 15:32) (93% - 97%)  I&O's Summary    07 Nov 2019 07:01  -  08 Nov 2019 07:00  --------------------------------------------------------  IN: 480 mL / OUT: 0 mL / NET: 480 mL    08 Nov 2019 07:01  -  08 Nov 2019 15:41  --------------------------------------------------------  IN: 240 mL / OUT: 0 mL / NET: 240 mL        PHYSICAL EXAM:  General: NAD, thin female  Head: NT/AC  ENT: MMM, no oropharyngeal erythema or exudates  Neck: Supple, No JVD  Lungs: decreased breath sounds at bases b/l, no wheezing, rales, or rhonchi, no accessory muscle use  Cardio: RRR, normal S1/S2, No M/R/G  Abdomen: Normoactive BS, Abdomen soft, nontender, nondistended; no organomegaly  Extremities: No calf tenderness, no clubbing or  cyanosis  Vascular: no LE edema  Skin: warm and dry  Neuro: AAOx3, answers all questions appropriately, moves all extremities    LABS:                        11.6   6.26  )-----------( 145      ( 08 Nov 2019 06:05 )             36.7     11-08    143  |  107  |  21  ----------------------------<  84  3.6   |  30  |  0.67    Ca    8.5      08 Nov 2019 06:05  Phos  3.3     11-06  Mg     1.3     11-06      eGFR if Non African American: 91 mL/min/1.73M2 (11-08-19 @ 06:05)  eGFR if African American: 106 mL/min/1.73M2 (11-08-19 @ 06:05)                  ABG - ( 06 Nov 2019 05:50 )  pH, Arterial: 7.40  pH, Blood: x     /  pCO2: 40    /  pO2: 72    / HCO3: x     / Base Excess: x     /  SaO2: 94                              Culture - Urine (collected 05 Nov 2019 09:50)  Source: .Urine Clean Catch (Midstream)  Final Report (07 Nov 2019 17:59):    >100,000 CFU/ml Escherichia coli  Organism: Escherichia coli (07 Nov 2019 17:59)  Organism: Escherichia coli (07 Nov 2019 17:59)      -  Amikacin: S <=16      -  Ampicillin: S <=8 These ampicillin results predict results for amoxicillin      -  Ampicillin/Sulbactam: S <=8/4 Enterobacter, Citrobacter, and Serratia may develop resistance during prolonged therapy (3-4 days)      -  Aztreonam: S <=4      -  Cefazolin: S <=8 (MIC_CL_COM_ENTERIC_CEFAZU) For uncomplicated UTI with K. pneumoniae, E. coli, or P. mirablis: LENORE <=16 is sensitive and LENORE >=32 is resistant. This also predicts results for oral agents cefaclor, cefdinir, cefpodoxime, cefprozil, cefuroxime axetil, cephalexin and locarbef for uncomplicated UTI. Note that some isolates may be susceptible to these agents while testing resistant to cefazolin.      -  Cefepime: S <=4      -  Cefoxitin: S <=8      -  Ceftriaxone: S <=1 Enterobacter, Citrobacter, and Serratia may develop resistance during prolonged therapy      -  Ciprofloxacin: S <=1      -  Gentamicin: S <=4      -  Imipenem: S <=1      -  Levofloxacin: S <=2      -  Meropenem: S <=1      -  Nitrofurantoin: S <=32 Should not be used to treat pyelonephritis      -  Piperacillin/Tazobactam: S <=16      -  Tigecycline: S <=2      -  Tobramycin: S <=4      -  Trimethoprim/Sulfamethoxazole: S <=2/38      Method Type: LENORE    Culture - Blood (collected 05 Nov 2019 09:50)  Source: .Blood Blood-Peripheral  Preliminary Report (06 Nov 2019 18:01):    No growth to date.    Culture - Blood (collected 05 Nov 2019 09:50)  Source: .Blood Blood-Peripheral  Preliminary Report (06 Nov 2019 18:01):    No growth to date.        RADIOLOGY & ADDITIONAL TESTS:    < from: Xray Chest 1 View- PORTABLE-Routine (11.07.19 @ 09:20) >    EXAM:  XR CHEST PORTABLE ROUTINE 1V      PROCEDURE DATE:  11/07/2019        INTERPRETATION:  Clinical indication:  eval pna fever    Technique: XR CHEST portable AP    Comparison:11/5/2019.    Findings:  Lines: None    Heart/Mediastinum/Lungs: The heart size is normal. There is patchy   airspace disease in the left lower lobe, compatible with pneumonia. The   right lung remains relatively clear.There are no pleural effusions.    Impression:    As above.    < end of copied text >    Care Discussed with Consultants/Other Providers:

## 2019-11-08 NOTE — PROGRESS NOTE ADULT - SUBJECTIVE AND OBJECTIVE BOX
CC: f/u for pneumonia    Patient reports she is very sad about her dog    REVIEW OF SYSTEMS:  All other review of systems negative (Comprehensive ROS)    Antimicrobials Day #  :4/7  cefepime   IVPB 1000 milliGRAM(s) IV Intermittent every 12 hours  doxycycline IVPB 100 milliGRAM(s) IV Intermittent every 12 hours  doxycycline IVPB        Other Medications Reviewed    T(F): 97.6 (11-08-19 @ 11:14), Max: 98 (11-07-19 @ 19:55)  HR: 74 (11-08-19 @ 15:32)  BP: 117/71 (11-08-19 @ 11:14)  RR: 16 (11-08-19 @ 11:14)  SpO2: 97% (11-08-19 @ 15:32)  Wt(kg): --    PHYSICAL EXAM:  General: alert, no acute distress  Eyes:  anicteric, no conjunctival injection, no discharge  Oropharynx: no lesions or injection 	  Neck: supple, without adenopathy  Lungs: basilar rales to auscultation  Heart: regular rate and rhythm; no murmur, rubs or gallops  Abdomen: soft, nondistended, nontender, without mass or organomegaly  Skin: no lesions  Extremities: no clubbing, cyanosis, or edema  Neurologic: alert, emotionally labile, moves all extremities    LAB RESULTS:                        11.6   6.26  )-----------( 145      ( 08 Nov 2019 06:05 )             36.7     11-08    143  |  107  |  21  ----------------------------<  84  3.6   |  30  |  0.67    Ca    8.5      08 Nov 2019 06:05          MICROBIOLOGY:  RECENT CULTURES:  11-05 @ 09:50 .Blood Blood-Peripheral   No growth to date.          RADIOLOGY REVIEWED:  < from: CT Chest No Cont (11.01.19 @ 14:53) >  EXAM:  CT CHEST        PROCEDURE DATE:  11/01/2019          INTERPRETATION:  CLINICAL INFORMATION: Hyperinflated lungs. Follow-up to   chest radiograph.    COMPARISON: Chest radiographs, the latest dated 9/5/2019, CT abdomen   pelvis 4/10/2016.    PROCEDURE:   CT of the Chest was performed without intravenous contrast.  Sagittal and coronal reformats were performed.      FINDINGS:    LUNGS AND AIRWAYS: Patent central airways.  There is severe changes of   centrilobular emphysema with architectural distortion. There are   irregular nodular opacities in the left lower lobe with adjacent   tree-in-bud like opacities, most likely of infectious or in inflammatory   etiology. The largest of these nodular components in a segmental   distribution measures 1.1 x 0.7 cm on image #107/series 3. There is   another subpleural irregular nodule in the left lower lobe on image   #101/series 3 measuring 0.9 x 0.8 cm. The largest nodule is in the left   upper lobe peripherally, abutting the pleura and left main fissure and   contiguous with the left hilum, measuring 2.3 x 1.5 cm.     PLEURA: No pleural effusion.    MEDIASTINUM AND JEFERSON: No lymphadenopathy.    VESSELS: The ascending aorta is mildly ectatic measuring 3.7 x 3.8 cm at   its widest axial dimension. The main pulmonary arteries normal in caliber.    HEART: Heart size is normal. There is aortic valvular calcification. No   pericardial effusion.    CHEST WALL AND LOWER NECK: The thyroid gland is small in size. There is   no supraclavicular or axillary lymphadenopathy. There are however several   subcentimeter bilateral axillary lymph nodes.    VISUALIZED UPPER ABDOMEN: The adrenal glands are within normal limits.   The imaged portions of the unenhanced liver and kidneys are within normal   limits. The spleen is enlarged.    BONES: Multilevel degenerative change of the thoracic spine with   osteophytes, degenerative disc disease and facet joint arthropathy.    IMPRESSION:     Irregular solid nodules in the left lung, the largest inthe left upper   lobe measures 2.3 x 1.5 cm, and is contiguous with the left hilum. In the   setting of emphysema, malignancy cannot be excluded and further   assessment is recommended.    Additional smaller irregular nodules in the left lower lobe, some with   tree-in-bud distribution, these may be of infectious or inflammatory   etiology.    Justyn Vaughn at Dr. Gudino's office stated that she will bring the report   to his attention.    An email with these findings was sent to Dr. JEAN GUDINO 11/1/2019   3:33 PM by Dr. Seymour with read back confirmation. Spoke to Dr. Gudino   at approximately 3:38 PM.        < end of copied text >    < from: Xray Chest 1 View- PORTABLE-Routine (11.07.19 @ 09:20) >  EXAM:  XR CHEST PORTABLE ROUTINE 1V      PROCEDURE DATE:  11/07/2019        INTERPRETATION:  Clinical indication:  eval pna fever    Technique: XR CHEST portable AP    Comparison:11/5/2019.    Findings:  Lines: None    Heart/Mediastinum/Lungs: The heart size is normal. There is patchy   airspace disease in the left lower lobe, compatible with pneumonia. The   right lung remains relatively clear.There are no pleural effusions.    Impression:    As above.      < end of copied text >    Assessment:  Patient with dementia admitted with pneumonia, improved on iv cefepime and doxy. May have underlying damain, aspiration and/or cancer of the lung  Plan:  3 more days of antibiotics , cefepime and doxy in hospital, po ceftin and doxy when goes home  pet as outpatient planned by Dr. Gudino

## 2019-11-08 NOTE — PROGRESS NOTE ADULT - ASSESSMENT
70 yo F with PMHx of hypothyroidism, tobacco abuse, chronic back pain, anxiety, ?alzheimers dementia p/w sepsis 2/2 LLL PNA.    # Severe sepsis: resolved  # LLL PNA: improving   - continue Cefepime and doxycycline, day 4/7 of Abx today  - Plan to switch to PO tomorrow (ceftin + doxy)  - ID consult appreciated    #Acute on chronic COPD exacerbation/ Former smoker  - continue slow steroid taper  - continue Duonebs    #Acute hypoxic respiratory failure: resolved, currently on RA. 2/2 PNA and COPD   - continue PNA and COPD exacerbation treatment    #Lung nodules vs Lung mass recently diagnosed   - high suspicion for malignancy   - Outpatient PET scan to evaluate lung lesions  - Pulmonary consult appreciated     #LE cellulitis/wound s/p Skin graft  - Dr. Evans wound following: continue every other day xeroform dressing change     #Hypothyroidism  - continue synthroid   - check TSH    #Dementia:  - continue Aricept and Namenda    #Anxiety:  - continue Xanax 3mg TID  - Psych consult pending    #DVT prophylaxis:  - on lovenox

## 2019-11-08 NOTE — PROGRESS NOTE ADULT - SUBJECTIVE AND OBJECTIVE BOX
Follow-up Pulmonary Progress Note  Chief Complaint : Sepsis      pt anxious, emotional today  denies any chest pain, sob  respiratory distress improved       Allergies :Talwin (Unknown)      PAST MEDICAL & SURGICAL HISTORY:  Anxiety  HTN (hypertension)  Smoker  COPD (chronic obstructive pulmonary disease)  Wound  Alzheimer disease  Chronic back pain  Hypothyroid  History of arthroplasty of left hip      Medications:  MEDICATIONS  (STANDING):  albuterol/ipratropium for Nebulization 3 milliLiter(s) Nebulizer every 20 minutes  albuterol/ipratropium for Nebulization 3 milliLiter(s) Nebulizer every 6 hours  ALPRAZolam 3 milliGRAM(s) Oral two times a day  ascorbic acid 500 milliGRAM(s) Oral daily  Biotene Dry Mouth Oral Rinse 5 milliLiter(s) Swish and Spit four times a day  cefepime   IVPB 1000 milliGRAM(s) IV Intermittent every 12 hours  docusate sodium Oral Tab/Cap - Peds 100 milliGRAM(s) Oral three times a day  donepezil 10 milliGRAM(s) Oral at bedtime  doxycycline IVPB 100 milliGRAM(s) IV Intermittent every 12 hours  doxycycline IVPB      enoxaparin Injectable 40 milliGRAM(s) SubCutaneous daily  levothyroxine 150 MICROGram(s) Oral daily  memantine 10 milliGRAM(s) Oral two times a day  morphine ER Tablet 60 milliGRAM(s) Oral two times a day  multivitamin 1 Tablet(s) Oral daily  predniSONE   Tablet   Oral   predniSONE   Tablet 40 milliGRAM(s) Oral daily  senna 2 Tablet(s) Oral at bedtime  zinc sulfate 220 milliGRAM(s) Oral daily    MEDICATIONS  (PRN):      LABS:                        11.6   6.26  )-----------( 145      ( 08 Nov 2019 06:05 )             36.7     11-08    143  |  107  |  21  ----------------------------<  84  3.6   |  30  |  0.67    Ca    8.5      08 Nov 2019 06:05                          CULTURES: (if applicable)    Culture - Urine (collected 11-05-19 @ 09:50)  Source: .Urine Clean Catch (Midstream)  Final Report (11-07-19 @ 17:59):    >100,000 CFU/ml Escherichia coli  Organism: Escherichia coli (11-07-19 @ 17:59)  Organism: Escherichia coli (11-07-19 @ 17:59)      -  Amikacin: S <=16      -  Ampicillin: S <=8 These ampicillin results predict results for amoxicillin      -  Ampicillin/Sulbactam: S <=8/4 Enterobacter, Citrobacter, and Serratia may develop resistance during prolonged therapy (3-4 days)      -  Aztreonam: S <=4      -  Cefazolin: S <=8 (MIC_CL_COM_ENTERIC_CEFAZU) For uncomplicated UTI with K. pneumoniae, E. coli, or P. mirablis: LENORE <=16 is sensitive and LENORE >=32 is resistant. This also predicts results for oral agents cefaclor, cefdinir, cefpodoxime, cefprozil, cefuroxime axetil, cephalexin and locarbef for uncomplicated UTI. Note that some isolates may be susceptible to these agents while testing resistant to cefazolin.      -  Cefepime: S <=4      -  Cefoxitin: S <=8      -  Ceftriaxone: S <=1 Enterobacter, Citrobacter, and Serratia may develop resistance during prolonged therapy      -  Ciprofloxacin: S <=1      -  Gentamicin: S <=4      -  Imipenem: S <=1      -  Levofloxacin: S <=2      -  Meropenem: S <=1      -  Nitrofurantoin: S <=32 Should not be used to treat pyelonephritis      -  Piperacillin/Tazobactam: S <=16      -  Tigecycline: S <=2      -  Tobramycin: S <=4      -  Trimethoprim/Sulfamethoxazole: S <=2/38      Method Type: LENORE    Culture - Blood (collected 11-05-19 @ 09:50)  Source: .Blood Blood-Peripheral  Preliminary Report (11-06-19 @ 18:01):    No growth to date.    Culture - Blood (collected 11-05-19 @ 09:50)  Source: .Blood Blood-Peripheral  Preliminary Report (11-06-19 @ 18:01):    No growth to date.      Rapid RVP Result: NotDetec (11-05-19 @ 14:00)        CAPILLARY BLOOD GLUCOSE          RADIOLOGY  CXR:      CT:    ECHO:      VITALS:  T(C): 36.4 (11-08-19 @ 05:30), Max: 36.9 (11-07-19 @ 16:09)  T(F): 97.6 (11-08-19 @ 05:30), Max: 98.4 (11-07-19 @ 16:09)  HR: 89 (11-08-19 @ 09:13) (72 - 100)  BP: 142/75 (11-08-19 @ 05:30) (107/69 - 142/75)  BP(mean): --  ABP: --  ABP(mean): --  RR: 16 (11-08-19 @ 05:30) (16 - 18)  SpO2: 96% (11-08-19 @ 09:13) (92% - 99%)  CVP(mm Hg): --  CVP(cm H2O): --    Ins and Outs     11-07-19 @ 07:01  -  11-08-19 @ 07:00  --------------------------------------------------------  IN: 480 mL / OUT: 0 mL / NET: 480 mL          Weight (kg): 52 (11-05-19 @ 15:15)        I&O's Detail    07 Nov 2019 07:01  -  08 Nov 2019 07:00  --------------------------------------------------------  IN:    Oral Fluid: 480 mL  Total IN: 480 mL    OUT:  Total OUT: 0 mL    Total NET: 480 mL

## 2019-11-08 NOTE — PROGRESS NOTE ADULT - ASSESSMENT
Physical Examination:  GENERAL:               Alert, Oriented cachetic appearing  HEENT:                   No JVD, moist MM  PULM:                     diminished breath sounds throughout , no gross wheezing  CVS:                         S1, S2,  No Murmur  ABD:                        Soft, nondistended, nontender, normoactive bowel sounds,   NEURO:                  Alert, oriented, interactive, nonfocal, follows commands  PSYC:                      Calm, + Insight.    Assessment:  1) Severe sepsis due to LLL PNA - Improving  2) Hypoxic respiratory failure / Respiratory distress requiring NIV  Resolved  3) Lung nodules vs Lung mass recently diagnosed - Awaiting out patient PET scan  4) RLE cellulitis/wound s/p Skin graft  5) Possible acute on chronic COPD exacerbation , Ex smoker  6) Underlying Anxiety, hypothyroid,       Plan:  - RA o2  - ABX as per ID Cefepime, Vanco, Doxy - 7 day course  - Taper Steroids to oral   - Continue Nebs  - Dr. Evans wound follow up called for RLE chronic lesion -apply xeroform gauze with protective dressing, change every other day, apply ace bandage when out of bed  - biotine for dry mouth  - once stabilized pt will need PET scan to evaluate lung lesions r/o ca.     palliative care for MOLST/Health care proxy form  Pt does not want to go to [facility] on d/c - Social work eval   Severe anxiety increasing consider psyc eval  case d/w Dr. Patricia MEADE: Full CODE - patient requests that in the event she is unable to make decisions for herself that, Marilin, her friend present at bedside be appointment he medical decision maker.

## 2019-11-08 NOTE — PROGRESS NOTE ADULT - SUBJECTIVE AND OBJECTIVE BOX
( x ) No complaints (  ) Complains of:     T(F): 97.6 (11-08-19 @ 11:14), Max: 98.4 (11-07-19 @ 16:09)  HR: 68 (11-08-19 @ 11:14) (68 - 100)  BP: 117/71 (11-08-19 @ 11:14) (117/71 - 142/75)  RR: 16 (11-08-19 @ 11:14) (16 - 17)  SpO2: 93% (11-08-19 @ 11:14) (93% - 99%)        Wound Location 1:  right leg wound improved appearnce, clean pink serosang drainage, less edema right leg                                  11.6   6.26  )-----------( 145      ( 08 Nov 2019 06:05 )             36.7     CBC Full  -  ( 08 Nov 2019 06:05 )  WBC Count : 6.26 K/uL  RBC Count : 4.27 M/uL  Hemoglobin : 11.6 g/dL  Hematocrit : 36.7 %  Platelet Count - Automated : 145 K/uL  Mean Cell Volume : 85.9 fl  Mean Cell Hemoglobin : 27.2 pg  Mean Cell Hemoglobin Concentration : 31.6 gm/dL  Auto Neutrophil # : x  Auto Lymphocyte # : x  Auto Monocyte # : x  Auto Eosinophil # : x  Auto Basophil # : x  Auto Neutrophil % : x  Auto Lymphocyte % : x  Auto Monocyte % : x  Auto Eosinophil % : x  Auto Basophil % : x    143|107|21<84  3.6|30|0.67  8.5,--,--  11-08 @ 06:05  142|106|18<175  3.5|27|0.71  8.7,--,--  11-07 @ 13:33                  Procedure Performed:  (  )Yes     ( x )No  Name of Procedure:  (  )debridement    (  )I&D    (  )Other:  (  )partial thickness     (  )full thickness     (  )subcutaneous     (  )muscle/tendon     (  )bone  (  )sharp     (  )surgical

## 2019-11-09 ENCOUNTER — TRANSCRIPTION ENCOUNTER (OUTPATIENT)
Age: 69
End: 2019-11-09

## 2019-11-09 VITALS
RESPIRATION RATE: 17 BRPM | TEMPERATURE: 98 F | HEART RATE: 96 BPM | SYSTOLIC BLOOD PRESSURE: 124 MMHG | DIASTOLIC BLOOD PRESSURE: 79 MMHG | OXYGEN SATURATION: 96 %

## 2019-11-09 LAB
ANION GAP SERPL CALC-SCNC: 4 MMOL/L — LOW (ref 5–17)
BUN SERPL-MCNC: 22 MG/DL — SIGNIFICANT CHANGE UP (ref 7–23)
CALCIUM SERPL-MCNC: 8.1 MG/DL — LOW (ref 8.4–10.5)
CHLORIDE SERPL-SCNC: 106 MMOL/L — SIGNIFICANT CHANGE UP (ref 96–108)
CO2 SERPL-SCNC: 31 MMOL/L — SIGNIFICANT CHANGE UP (ref 22–31)
CREAT SERPL-MCNC: 0.74 MG/DL — SIGNIFICANT CHANGE UP (ref 0.5–1.3)
GLUCOSE SERPL-MCNC: 100 MG/DL — HIGH (ref 70–99)
HCT VFR BLD CALC: 33 % — LOW (ref 34.5–45)
HGB BLD-MCNC: 10.7 G/DL — LOW (ref 11.5–15.5)
MAGNESIUM SERPL-MCNC: 1.6 MG/DL — SIGNIFICANT CHANGE UP (ref 1.6–2.6)
MCHC RBC-ENTMCNC: 27.7 PG — SIGNIFICANT CHANGE UP (ref 27–34)
MCHC RBC-ENTMCNC: 32.4 GM/DL — SIGNIFICANT CHANGE UP (ref 32–36)
MCV RBC AUTO: 85.5 FL — SIGNIFICANT CHANGE UP (ref 80–100)
NRBC # BLD: 0 /100 WBCS — SIGNIFICANT CHANGE UP (ref 0–0)
PLATELET # BLD AUTO: 131 K/UL — LOW (ref 150–400)
POTASSIUM SERPL-MCNC: 3.7 MMOL/L — SIGNIFICANT CHANGE UP (ref 3.5–5.3)
POTASSIUM SERPL-SCNC: 3.7 MMOL/L — SIGNIFICANT CHANGE UP (ref 3.5–5.3)
RBC # BLD: 3.86 M/UL — SIGNIFICANT CHANGE UP (ref 3.8–5.2)
RBC # FLD: 13.7 % — SIGNIFICANT CHANGE UP (ref 10.3–14.5)
SODIUM SERPL-SCNC: 141 MMOL/L — SIGNIFICANT CHANGE UP (ref 135–145)
TSH SERPL-MCNC: 6.73 UIU/ML — HIGH (ref 0.27–4.2)
WBC # BLD: 3.88 K/UL — SIGNIFICANT CHANGE UP (ref 3.8–10.5)
WBC # FLD AUTO: 3.88 K/UL — SIGNIFICANT CHANGE UP (ref 3.8–10.5)

## 2019-11-09 PROCEDURE — 87186 SC STD MICRODIL/AGAR DIL: CPT

## 2019-11-09 PROCEDURE — 93005 ELECTROCARDIOGRAM TRACING: CPT

## 2019-11-09 PROCEDURE — 85730 THROMBOPLASTIN TIME PARTIAL: CPT

## 2019-11-09 PROCEDURE — 85027 COMPLETE CBC AUTOMATED: CPT

## 2019-11-09 PROCEDURE — 99285 EMERGENCY DEPT VISIT HI MDM: CPT | Mod: 25

## 2019-11-09 PROCEDURE — 94660 CPAP INITIATION&MGMT: CPT

## 2019-11-09 PROCEDURE — 84443 ASSAY THYROID STIM HORMONE: CPT

## 2019-11-09 PROCEDURE — 80048 BASIC METABOLIC PNL TOTAL CA: CPT

## 2019-11-09 PROCEDURE — 96367 TX/PROPH/DG ADDL SEQ IV INF: CPT

## 2019-11-09 PROCEDURE — 83735 ASSAY OF MAGNESIUM: CPT

## 2019-11-09 PROCEDURE — 93306 TTE W/DOPPLER COMPLETE: CPT

## 2019-11-09 PROCEDURE — 81001 URINALYSIS AUTO W/SCOPE: CPT

## 2019-11-09 PROCEDURE — 83605 ASSAY OF LACTIC ACID: CPT

## 2019-11-09 PROCEDURE — 87798 DETECT AGENT NOS DNA AMP: CPT

## 2019-11-09 PROCEDURE — 85610 PROTHROMBIN TIME: CPT

## 2019-11-09 PROCEDURE — 87633 RESP VIRUS 12-25 TARGETS: CPT

## 2019-11-09 PROCEDURE — 51701 INSERT BLADDER CATHETER: CPT

## 2019-11-09 PROCEDURE — 36600 WITHDRAWAL OF ARTERIAL BLOOD: CPT

## 2019-11-09 PROCEDURE — 87040 BLOOD CULTURE FOR BACTERIA: CPT

## 2019-11-09 PROCEDURE — 36415 COLL VENOUS BLD VENIPUNCTURE: CPT

## 2019-11-09 PROCEDURE — 87086 URINE CULTURE/COLONY COUNT: CPT

## 2019-11-09 PROCEDURE — 84100 ASSAY OF PHOSPHORUS: CPT

## 2019-11-09 PROCEDURE — 93970 EXTREMITY STUDY: CPT

## 2019-11-09 PROCEDURE — 87486 CHLMYD PNEUM DNA AMP PROBE: CPT

## 2019-11-09 PROCEDURE — 96365 THER/PROPH/DIAG IV INF INIT: CPT | Mod: XU

## 2019-11-09 PROCEDURE — 82803 BLOOD GASES ANY COMBINATION: CPT

## 2019-11-09 PROCEDURE — 80053 COMPREHEN METABOLIC PANEL: CPT

## 2019-11-09 PROCEDURE — 82962 GLUCOSE BLOOD TEST: CPT

## 2019-11-09 PROCEDURE — 99239 HOSP IP/OBS DSCHRG MGMT >30: CPT

## 2019-11-09 PROCEDURE — 87581 M.PNEUMON DNA AMP PROBE: CPT

## 2019-11-09 PROCEDURE — 71045 X-RAY EXAM CHEST 1 VIEW: CPT

## 2019-11-09 PROCEDURE — 36000 PLACE NEEDLE IN VEIN: CPT

## 2019-11-09 PROCEDURE — 94640 AIRWAY INHALATION TREATMENT: CPT

## 2019-11-09 RX ORDER — CEFUROXIME AXETIL 250 MG
1 TABLET ORAL
Qty: 5 | Refills: 0
Start: 2019-11-09

## 2019-11-09 RX ORDER — ALBUTEROL 90 UG/1
2 AEROSOL, METERED ORAL
Qty: 0 | Refills: 0 | DISCHARGE

## 2019-11-09 RX ORDER — MAGNESIUM OXIDE 400 MG ORAL TABLET 241.3 MG
400 TABLET ORAL
Refills: 0 | Status: DISCONTINUED | OUTPATIENT
Start: 2019-11-09 | End: 2019-11-09

## 2019-11-09 RX ORDER — POTASSIUM CHLORIDE 20 MEQ
40 PACKET (EA) ORAL ONCE
Refills: 0 | Status: COMPLETED | OUTPATIENT
Start: 2019-11-09 | End: 2019-11-09

## 2019-11-09 RX ADMIN — Medication 40 MILLIGRAM(S): at 05:41

## 2019-11-09 RX ADMIN — CEFEPIME 100 MILLIGRAM(S): 1 INJECTION, POWDER, FOR SOLUTION INTRAMUSCULAR; INTRAVENOUS at 05:41

## 2019-11-09 RX ADMIN — Medication 40 MILLIEQUIVALENT(S): at 09:32

## 2019-11-09 RX ADMIN — MAGNESIUM OXIDE 400 MG ORAL TABLET 400 MILLIGRAM(S): 241.3 TABLET ORAL at 09:25

## 2019-11-09 RX ADMIN — Medication 5 MILLILITER(S): at 06:24

## 2019-11-09 RX ADMIN — Medication 3 MILLIGRAM(S): at 09:30

## 2019-11-09 RX ADMIN — Medication 150 MICROGRAM(S): at 05:41

## 2019-11-09 RX ADMIN — Medication 500 MILLIGRAM(S): at 11:36

## 2019-11-09 RX ADMIN — ENOXAPARIN SODIUM 40 MILLIGRAM(S): 100 INJECTION SUBCUTANEOUS at 11:36

## 2019-11-09 RX ADMIN — MORPHINE SULFATE 60 MILLIGRAM(S): 50 CAPSULE, EXTENDED RELEASE ORAL at 09:27

## 2019-11-09 RX ADMIN — Medication 3 MILLILITER(S): at 04:01

## 2019-11-09 RX ADMIN — MAGNESIUM OXIDE 400 MG ORAL TABLET 400 MILLIGRAM(S): 241.3 TABLET ORAL at 11:36

## 2019-11-09 RX ADMIN — Medication 110 MILLIGRAM(S): at 05:39

## 2019-11-09 RX ADMIN — Medication 1 TABLET(S): at 11:36

## 2019-11-09 RX ADMIN — Medication 5 MILLILITER(S): at 11:38

## 2019-11-09 RX ADMIN — Medication 3 MILLILITER(S): at 09:03

## 2019-11-09 RX ADMIN — MEMANTINE HYDROCHLORIDE 10 MILLIGRAM(S): 10 TABLET ORAL at 05:41

## 2019-11-09 NOTE — PROGRESS NOTE ADULT - ASSESSMENT
Physical Examination:  GENERAL:               Alert, Oriented cachetic appearing  HEENT:                   No JVD, moist MM  PULM:                     diminished breath sounds throughout , no gross wheezing  CVS:                         S1, S2,  No Murmur  ABD:                        Soft, nondistended, nontender, normoactive bowel sounds,   NEURO:                  Alert, oriented, interactive, nonfocal, follows commands  PSYC:                      Calm, + Insight.    Assessment:  1) Severe sepsis due to LLL PNA - Improving  2) Hypoxic respiratory failure / Respiratory distress requiring NIV  Resolved  3) Lung nodules vs Lung mass recently diagnosed - Awaiting out patient PET scan  4) RLE cellulitis/wound s/p Skin graft  5) Possible acute on chronic COPD exacerbation , Ex smoker  6) Underlying Anxiety, hypothyroid,       Plan:  - RA o2  - ABX as per ID   - Taper Steroids to oral   - Continue Nebs  - [facility]Last[facility] followup regarding wound care  - once stabilized pt will need PET scan to evaluate lung lesions r/o ca. States has an out patient pulmonologist that she follows with  palliative care for MOLST/Health care proxy form  case d/w Dr. Patricia MEADE: Full CODE -     D/c planning

## 2019-11-09 NOTE — PROGRESS NOTE ADULT - ASSESSMENT
Patient is a 70 yo F with PMHx of hypothyroidism, tobacco abuse, chronic back pain, anxiety, alzheimers dementia presents to ED via ambulance for eval SOB, confusion. In the ED she was found to have t max of 105.  ddx for fever includes HCAP.   she is currently afebrile  clinically she is feeling better   RVP is not detected   Micro reviewed her blood cultures are no growth to date   clinically the patient reports that she is feeling much better     Plan   day # 5 of 7 antibiotics   1.	continue with Cefepime and doxycyline while she remains in the hospital. When she is ready for DC than can be switched  to oral Ceftin 500 mg po bid and doxycycline 100 mg po bid for two more days to complete course of antibiotics

## 2019-11-09 NOTE — PROGRESS NOTE ADULT - SUBJECTIVE AND OBJECTIVE BOX
Follow-up Pulmonary Progress Note  Chief Complaint : Sepsis    Awake, sitting in a chair. States feels much better and closer to baseline at home. Wants to go home. Denies any chest pain, shortness of breath, wheezing. Ambulating at bedside.       Allergies :Talwin (Unknown)      PAST MEDICAL & SURGICAL HISTORY:  Anxiety  HTN (hypertension)  Smoker  COPD (chronic obstructive pulmonary disease)  Wound  Alzheimer disease  Chronic back pain  Hypothyroid  History of arthroplasty of left hip      Medications:  MEDICATIONS  (STANDING):  albuterol/ipratropium for Nebulization 3 milliLiter(s) Nebulizer every 6 hours  ALPRAZolam 3 milliGRAM(s) Oral two times a day  ascorbic acid 500 milliGRAM(s) Oral daily  Biotene Dry Mouth Oral Rinse 5 milliLiter(s) Swish and Spit four times a day  cefepime   IVPB 1000 milliGRAM(s) IV Intermittent every 12 hours  docusate sodium Oral Tab/Cap - Peds 100 milliGRAM(s) Oral three times a day  donepezil 10 milliGRAM(s) Oral at bedtime  doxycycline hyclate Capsule 100 milliGRAM(s) Oral every 12 hours  enoxaparin Injectable 40 milliGRAM(s) SubCutaneous daily  levothyroxine 150 MICROGram(s) Oral daily  magnesium oxide 400 milliGRAM(s) Oral three times a day with meals  memantine 10 milliGRAM(s) Oral two times a day  morphine ER Tablet 60 milliGRAM(s) Oral two times a day  multivitamin 1 Tablet(s) Oral daily  predniSONE   Tablet   Oral   predniSONE   Tablet 40 milliGRAM(s) Oral daily  senna 2 Tablet(s) Oral at bedtime  zinc sulfate 220 milliGRAM(s) Oral daily    MEDICATIONS  (PRN):      LABS:                        10.7   3.88  )-----------( 131      ( 09 Nov 2019 06:14 )             33.0     11-09    141  |  106  |  22  ----------------------------<  100<H>  3.7   |  31  |  0.74    Ca    8.1<L>      09 Nov 2019 06:14  Mg     1.6     11-09                          CULTURES: (if applicable)    Culture - Urine (collected 11-05-19 @ 09:50)  Source: .Urine Clean Catch (Midstream)  Final Report (11-07-19 @ 17:59):    >100,000 CFU/ml Escherichia coli  Organism: Escherichia coli (11-07-19 @ 17:59)  Organism: Escherichia coli (11-07-19 @ 17:59)      -  Amikacin: S <=16      -  Ampicillin: S <=8 These ampicillin results predict results for amoxicillin      -  Ampicillin/Sulbactam: S <=8/4 Enterobacter, Citrobacter, and Serratia may develop resistance during prolonged therapy (3-4 days)      -  Aztreonam: S <=4      -  Cefazolin: S <=8 (MIC_CL_COM_ENTERIC_CEFAZU) For uncomplicated UTI with K. pneumoniae, E. coli, or P. mirablis: LENORE <=16 is sensitive and LENORE >=32 is resistant. This also predicts results for oral agents cefaclor, cefdinir, cefpodoxime, cefprozil, cefuroxime axetil, cephalexin and locarbef for uncomplicated UTI. Note that some isolates may be susceptible to these agents while testing resistant to cefazolin.      -  Cefepime: S <=4      -  Cefoxitin: S <=8      -  Ceftriaxone: S <=1 Enterobacter, Citrobacter, and Serratia may develop resistance during prolonged therapy      -  Ciprofloxacin: S <=1      -  Gentamicin: S <=4      -  Imipenem: S <=1      -  Levofloxacin: S <=2      -  Meropenem: S <=1      -  Nitrofurantoin: S <=32 Should not be used to treat pyelonephritis      -  Piperacillin/Tazobactam: S <=16      -  Tigecycline: S <=2      -  Tobramycin: S <=4      -  Trimethoprim/Sulfamethoxazole: S <=2/38      Method Type: LENORE    Culture - Blood (collected 11-05-19 @ 09:50)  Source: .Blood Blood-Peripheral  Preliminary Report (11-06-19 @ 18:01):    No growth to date.    Culture - Blood (collected 11-05-19 @ 09:50)  Source: .Blood Blood-Peripheral  Preliminary Report (11-06-19 @ 18:01):    No growth to date.      Rapid RVP Result: NotDetec (11-05-19 @ 14:00)        CAPILLARY BLOOD GLUCOSE          RADIOLOGY  CXR:      CT:    ECHO:      VITALS:  T(C): 36.6 (11-09-19 @ 10:00), Max: 36.8 (11-09-19 @ 05:00)  T(F): 97.9 (11-09-19 @ 10:00), Max: 98.3 (11-09-19 @ 05:00)  HR: 96 (11-09-19 @ 10:00) (71 - 96)  BP: 124/79 (11-09-19 @ 10:00) (100/65 - 142/80)  BP(mean): --  ABP: --  ABP(mean): --  RR: 17 (11-09-19 @ 10:00) (16 - 17)  SpO2: 96% (11-09-19 @ 10:00) (93% - 97%)  CVP(mm Hg): --  CVP(cm H2O): --    Ins and Outs     11-08-19 @ 07:01  -  11-09-19 @ 07:00  --------------------------------------------------------  IN: 440 mL / OUT: 0 mL / NET: 440 mL    11-09-19 @ 07:01  -  11-09-19 @ 11:49  --------------------------------------------------------  IN: 240 mL / OUT: 0 mL / NET: 240 mL                I&O's Detail    08 Nov 2019 07:01  -  09 Nov 2019 07:00  --------------------------------------------------------  IN:    Oral Fluid: 440 mL  Total IN: 440 mL    OUT:  Total OUT: 0 mL    Total NET: 440 mL      09 Nov 2019 07:01  -  09 Nov 2019 11:49  --------------------------------------------------------  IN:    Oral Fluid: 240 mL  Total IN: 240 mL    OUT:  Total OUT: 0 mL    Total NET: 240 mL

## 2019-11-09 NOTE — DISCHARGE NOTE NURSING/CASE MANAGEMENT/SOCIAL WORK - NSDCPEWEB_GEN_ALL_CORE
[place] website --- [email or web address] for Tobacco Control website --- [email or web address]

## 2019-11-09 NOTE — PROGRESS NOTE ADULT - SUBJECTIVE AND OBJECTIVE BOX
Patient is a 69y old  Female who presents with a chief complaint of PNA (09 Nov 2019 08:57)      Interval HPI/ Overnight events: No events overnight    Patient seen and examined at bedside, feels fine, denies chest pain, SOB, abdominal, n/v/d/c    REVIEW OF SYSTEMS:    CONSTITUTIONAL: No fevers or chills  EYES/ENT: No visual changes;  No vertigo or throat pain   NECK: No pain or stiffness  RESPIRATORY: No cough, wheezing, hemoptysis; No shortness of breath  CARDIOVASCULAR: No chest pain or palpitations  GASTROINTESTINAL: No abdominal or epigastric pain. No nausea, vomiting, or hematemesis; No diarrhea or constipation. No melena or hematochezia.  GENITOURINARY: No dysuria, frequency or hematuria  NEUROLOGICAL: No numbness or weakness  MSK: no joint pain, no joint swelling  All other review of systems is negative unless indicated above.      ALLERGIES:  Talwin (Unknown)    MEDICATIONS  (STANDING):  albuterol/ipratropium for Nebulization 3 milliLiter(s) Nebulizer every 6 hours  ALPRAZolam 3 milliGRAM(s) Oral two times a day  ascorbic acid 500 milliGRAM(s) Oral daily  Biotene Dry Mouth Oral Rinse 5 milliLiter(s) Swish and Spit four times a day  cefepime   IVPB 1000 milliGRAM(s) IV Intermittent every 12 hours  docusate sodium Oral Tab/Cap - Peds 100 milliGRAM(s) Oral three times a day  donepezil 10 milliGRAM(s) Oral at bedtime  doxycycline IVPB 100 milliGRAM(s) IV Intermittent every 12 hours  doxycycline IVPB      enoxaparin Injectable 40 milliGRAM(s) SubCutaneous daily  levothyroxine 150 MICROGram(s) Oral daily  magnesium oxide 400 milliGRAM(s) Oral three times a day with meals  memantine 10 milliGRAM(s) Oral two times a day  morphine ER Tablet 60 milliGRAM(s) Oral two times a day  multivitamin 1 Tablet(s) Oral daily  predniSONE   Tablet   Oral   predniSONE   Tablet 40 milliGRAM(s) Oral daily  senna 2 Tablet(s) Oral at bedtime  zinc sulfate 220 milliGRAM(s) Oral daily    MEDICATIONS  (PRN):    Vital Signs Last 24 Hrs  T(F): 98.3 (09 Nov 2019 05:00), Max: 98.3 (09 Nov 2019 05:00)  HR: 71 (09 Nov 2019 09:04) (68 - 87)  BP: 109/64 (09 Nov 2019 05:00) (100/65 - 142/80)  RR: 16 (09 Nov 2019 05:00) (16 - 17)  SpO2: 96% (09 Nov 2019 09:04) (93% - 97%)  I&O's Summary    08 Nov 2019 07:01  -  09 Nov 2019 07:00  --------------------------------------------------------  IN: 440 mL / OUT: 0 mL / NET: 440 mL      PHYSICAL EXAM:  General: NAD, thin female  Head: NT/AC  ENT: MMM, no oropharyngeal erythema or exudates  Neck: Supple, No JVD  Lungs: clear breath sounds b/l, no wheezing, rales, or rhonchi, no accessory muscle use  Cardio: RRR, normal S1/S2, No M/R/G  Abdomen: Normoactive BS, Abdomen soft, nontender, nondistended; no organomegaly  Extremities: No calf tenderness, no clubbing or  cyanosis  Vascular: no LE edema  Skin: warm and dry  Neuro: AAOx3, answers all questions appropriately, moves all extremities    LABS:                        10.7   3.88  )-----------( 131      ( 09 Nov 2019 06:14 )             33.0     11-09    141  |  106  |  22  ----------------------------<  100  3.7   |  31  |  0.74    Ca    8.1      09 Nov 2019 06:14  Mg     1.6     11-09      eGFR if Non African American: 83 mL/min/1.73M2 (11-09-19 @ 06:14)  eGFR if African American: 97 mL/min/1.73M2 (11-09-19 @ 06:14)                                  Culture - Urine (collected 05 Nov 2019 09:50)  Source: .Urine Clean Catch (Midstream)  Final Report (07 Nov 2019 17:59):    >100,000 CFU/ml Escherichia coli  Organism: Escherichia coli (07 Nov 2019 17:59)  Organism: Escherichia coli (07 Nov 2019 17:59)      -  Amikacin: S <=16      -  Ampicillin: S <=8 These ampicillin results predict results for amoxicillin      -  Ampicillin/Sulbactam: S <=8/4 Enterobacter, Citrobacter, and Serratia may develop resistance during prolonged therapy (3-4 days)      -  Aztreonam: S <=4      -  Cefazolin: S <=8 (MIC_CL_COM_ENTERIC_CEFAZU) For uncomplicated UTI with K. pneumoniae, E. coli, or P. mirablis: LENORE <=16 is sensitive and LENORE >=32 is resistant. This also predicts results for oral agents cefaclor, cefdinir, cefpodoxime, cefprozil, cefuroxime axetil, cephalexin and locarbef for uncomplicated UTI. Note that some isolates may be susceptible to these agents while testing resistant to cefazolin.      -  Cefepime: S <=4      -  Cefoxitin: S <=8      -  Ceftriaxone: S <=1 Enterobacter, Citrobacter, and Serratia may develop resistance during prolonged therapy      -  Ciprofloxacin: S <=1      -  Gentamicin: S <=4      -  Imipenem: S <=1      -  Levofloxacin: S <=2      -  Meropenem: S <=1      -  Nitrofurantoin: S <=32 Should not be used to treat pyelonephritis      -  Piperacillin/Tazobactam: S <=16      -  Tigecycline: S <=2      -  Tobramycin: S <=4      -  Trimethoprim/Sulfamethoxazole: S <=2/38      Method Type: LENORE    Culture - Blood (collected 05 Nov 2019 09:50)  Source: .Blood Blood-Peripheral  Preliminary Report (06 Nov 2019 18:01):    No growth to date.    Culture - Blood (collected 05 Nov 2019 09:50)  Source: .Blood Blood-Peripheral  Preliminary Report (06 Nov 2019 18:01):    No growth to date.        RADIOLOGY & ADDITIONAL TESTS:    Care Discussed with Consultants/Other Providers: Patient is a 69y old  Female who presents with a chief complaint of PNA (09 Nov 2019 08:57)      Interval HPI/ Overnight events: No events overnight    Patient seen and examined at bedside, feels fine, denies chest pain, SOB, abdominal, n/v/d/c    REVIEW OF SYSTEMS:    CONSTITUTIONAL: No fevers or chills  EYES/ENT: No visual changes;  No vertigo or throat pain   NECK: No pain or stiffness  RESPIRATORY: No cough, wheezing, hemoptysis; No shortness of breath  CARDIOVASCULAR: No chest pain or palpitations  GASTROINTESTINAL: No abdominal or epigastric pain. No nausea, vomiting, or hematemesis; No diarrhea or constipation. No melena or hematochezia.  GENITOURINARY: No dysuria, frequency or hematuria  NEUROLOGICAL: No numbness or weakness  MSK: no joint pain, no joint swelling  All other review of systems is negative unless indicated above.      ALLERGIES:  Talwin (Unknown)    MEDICATIONS  (STANDING):  albuterol/ipratropium for Nebulization 3 milliLiter(s) Nebulizer every 6 hours  ALPRAZolam 3 milliGRAM(s) Oral two times a day  ascorbic acid 500 milliGRAM(s) Oral daily  Biotene Dry Mouth Oral Rinse 5 milliLiter(s) Swish and Spit four times a day  cefepime   IVPB 1000 milliGRAM(s) IV Intermittent every 12 hours  docusate sodium Oral Tab/Cap - Peds 100 milliGRAM(s) Oral three times a day  donepezil 10 milliGRAM(s) Oral at bedtime  doxycycline IVPB 100 milliGRAM(s) IV Intermittent every 12 hours  doxycycline IVPB      enoxaparin Injectable 40 milliGRAM(s) SubCutaneous daily  levothyroxine 150 MICROGram(s) Oral daily  magnesium oxide 400 milliGRAM(s) Oral three times a day with meals  memantine 10 milliGRAM(s) Oral two times a day  morphine ER Tablet 60 milliGRAM(s) Oral two times a day  multivitamin 1 Tablet(s) Oral daily  predniSONE   Tablet   Oral   predniSONE   Tablet 40 milliGRAM(s) Oral daily  senna 2 Tablet(s) Oral at bedtime  zinc sulfate 220 milliGRAM(s) Oral daily    MEDICATIONS  (PRN):    Vital Signs Last 24 Hrs  T(F): 98.3 (09 Nov 2019 05:00), Max: 98.3 (09 Nov 2019 05:00)  HR: 71 (09 Nov 2019 09:04) (68 - 87)  BP: 109/64 (09 Nov 2019 05:00) (100/65 - 142/80)  RR: 16 (09 Nov 2019 05:00) (16 - 17)  SpO2: 96% (09 Nov 2019 09:04) (93% - 97%)  I&O's Summary    08 Nov 2019 07:01  -  09 Nov 2019 07:00  --------------------------------------------------------  IN: 440 mL / OUT: 0 mL / NET: 440 mL      PHYSICAL EXAM:  General: NAD, thin female  Head: NT/AC  ENT: MMM, no oropharyngeal erythema or exudates  Neck: Supple, No JVD  Lungs: clear breath sounds b/l, no wheezing, rales, or rhonchi, no accessory muscle use  Cardio: RRR, normal S1/S2, No M/R/G  Abdomen: Normoactive BS, Abdomen soft, nontender, nondistended; no organomegaly  Extremities: No calf tenderness, no clubbing or  cyanosis  Vascular: no LE edema  Skin: warm and dry, RLE lesion covered in gauze dressing, no drainage  Neuro: AAOx3, answers all questions appropriately, moves all extremities    LABS:                        10.7   3.88  )-----------( 131      ( 09 Nov 2019 06:14 )             33.0     11-09    141  |  106  |  22  ----------------------------<  100  3.7   |  31  |  0.74    Ca    8.1      09 Nov 2019 06:14  Mg     1.6     11-09      eGFR if Non African American: 83 mL/min/1.73M2 (11-09-19 @ 06:14)  eGFR if African American: 97 mL/min/1.73M2 (11-09-19 @ 06:14)                                  Culture - Urine (collected 05 Nov 2019 09:50)  Source: .Urine Clean Catch (Midstream)  Final Report (07 Nov 2019 17:59):    >100,000 CFU/ml Escherichia coli  Organism: Escherichia coli (07 Nov 2019 17:59)  Organism: Escherichia coli (07 Nov 2019 17:59)      -  Amikacin: S <=16      -  Ampicillin: S <=8 These ampicillin results predict results for amoxicillin      -  Ampicillin/Sulbactam: S <=8/4 Enterobacter, Citrobacter, and Serratia may develop resistance during prolonged therapy (3-4 days)      -  Aztreonam: S <=4      -  Cefazolin: S <=8 (MIC_CL_COM_ENTERIC_CEFAZU) For uncomplicated UTI with K. pneumoniae, E. coli, or P. mirablis: LENORE <=16 is sensitive and LENORE >=32 is resistant. This also predicts results for oral agents cefaclor, cefdinir, cefpodoxime, cefprozil, cefuroxime axetil, cephalexin and locarbef for uncomplicated UTI. Note that some isolates may be susceptible to these agents while testing resistant to cefazolin.      -  Cefepime: S <=4      -  Cefoxitin: S <=8      -  Ceftriaxone: S <=1 Enterobacter, Citrobacter, and Serratia may develop resistance during prolonged therapy      -  Ciprofloxacin: S <=1      -  Gentamicin: S <=4      -  Imipenem: S <=1      -  Levofloxacin: S <=2      -  Meropenem: S <=1      -  Nitrofurantoin: S <=32 Should not be used to treat pyelonephritis      -  Piperacillin/Tazobactam: S <=16      -  Tigecycline: S <=2      -  Tobramycin: S <=4      -  Trimethoprim/Sulfamethoxazole: S <=2/38      Method Type: LENORE    Culture - Blood (collected 05 Nov 2019 09:50)  Source: .Blood Blood-Peripheral  Preliminary Report (06 Nov 2019 18:01):    No growth to date.    Culture - Blood (collected 05 Nov 2019 09:50)  Source: .Blood Blood-Peripheral  Preliminary Report (06 Nov 2019 18:01):    No growth to date.        RADIOLOGY & ADDITIONAL TESTS:    Care Discussed with Consultants/Other Providers:

## 2019-11-09 NOTE — DISCHARGE NOTE PROVIDER - CARE PROVIDER_API CALL
Lloyd Fitzgerald (DO)  [address], [address]  [place] [address]  Phone: [phone]  Fax: [phone]  Follow Up Time:

## 2019-11-09 NOTE — DISCHARGE NOTE PROVIDER - NSDCMRMEDTOKEN_GEN_ALL_CORE_FT
Aricept 10 mg oral tablet: 1 tab(s) orally once a day (at bedtime)  ascorbic acid 500 mg oral tablet: 1 tab(s) orally once a day  cefuroxime 500 mg oral tablet: 1 tab(s) orally 2 times a day. First dose 6pm 11/09. Last dose 6pm 11/11  docusate sodium 100 mg oral capsule: 1 cap(s) orally 3 times a day  doxycycline monohydrate 100 mg oral capsule: 1 cap(s) orally every 12 hours. First dose 6pm 11/09. Last dose 6pm on 11/11  levothyroxine 150 mcg (0.15 mg) oral tablet: 1 tab(s) orally once a day  memantine 10 mg oral tablet: 1 tab(s) orally 2 times a day  morphine 60 mg/12 hours oral tablet, extended release: 1 tab(s) orally 2 times a day  Multiple Vitamins oral tablet: 1 tab(s) orally once a day  nicotine 21 mg/24 hr transdermal film, extended release: 1 patch transdermal once a day  predniSONE 10 mg oral tablet: 4 tabs orally once a day on 11/10  3 tabs orally once a day x 3 days (11/11-13)  2 tabs orally once a day x 3 days (11/14-17)  1 tabs orally once a day x 3 days (11/18-20)  senna oral tablet: 2 tab(s) orally once a day (at bedtime)  Ventolin HFA 90 mcg/inh inhalation aerosol: 2 puff(s) inhaled 4 times a day, As Needed  Xanax 2 mg oral tablet: 1.5 tab(s) orally 2 times a day  zinc sulfate 220 mg oral capsule: 1 cap(s) orally once a day

## 2019-11-09 NOTE — DISCHARGE NOTE PROVIDER - NSDCCPCAREPLAN_GEN_ALL_CORE_FT
PRINCIPAL DISCHARGE DIAGNOSIS  Diagnosis: Severe sepsis  Assessment and Plan of Treatment:       SECONDARY DISCHARGE DIAGNOSES  Diagnosis: COPD exacerbation  Assessment and Plan of Treatment: Continue steroid taper prednisone 40mg once on 11/10, then 30mg once a  day from 11/11-13, then 20mg once a day from 11/14-17, then 10mg once a day from 11/18-20    Diagnosis: Left lower lobe pneumonia  Assessment and Plan of Treatment: continue Doxycycline 100mg twice a day. First dose 11/09 evening 6pm. Last dose 11/11

## 2019-11-09 NOTE — DISCHARGE NOTE NURSING/CASE MANAGEMENT/SOCIAL WORK - PATIENT PORTAL LINK FT
You can access the FollowMyHealth Patient Portal offered by [facility] by registering at the following website: [email or web address]. By joining [facility]’s FollowMyHealth portal, you will also be able to view your health information using other applications (apps) compatible with our system.

## 2019-11-09 NOTE — DISCHARGE NOTE PROVIDER - HOSPITAL COURSE
68 yo F with PMHx of hypothyroidism, tobacco abuse, chronic back pain, anxiety, dementia p/w SOB and confusion, admitted for severe sepsis 2/2 LLL PNA. Pt required BiPaP for acute hypoxic respiratory failure 2/2 LLL PNA and COPD exacerbation. She was seen by ID and treated with IV Abx (doxy and cefepime) and steroid taper. She improved clinically and is stable for discharge back to [facility]. Pt was also seen by Plastics surgery for wound care of her healed skin graft (9/5/19) of right leg.        # LLL PNA:     - on day 5/7 of Abx    - plan for dc today on ceftin and  doxy to complete total 7 day course (last dose evening of 11/11)        #Acute on chronic COPD exacerbation/ Former smoker    - continue slow steroid taper (prednisone 40mg qd 10/10, prednisone 30mg qd x 3 days 10/11-13, prednisone 20mg qd x 3 days 10/14-17, prednisone 10mg qd x 3 days 10/18-20)     - continue Duonebs PRN        #Lung nodules vs Lung mass recently diagnosed     - Outpatient PET scan to evaluate lung lesions    - Pulmonary outpatient f/u with Dr. Gudino        #LE cellulitis/wound s/p Skin graft    - Dr. Evans wound following: continue every other day xeroform dressing change         #Hypothyroidism    - continue synthroid         #Dementia:    - continue Aricept and Namenda        #Anxiety:    - continue Xanax 3mg TID

## 2019-11-09 NOTE — DISCHARGE NOTE NURSING/CASE MANAGEMENT/SOCIAL WORK - NSDCPEEMAIL_GEN_ALL_CORE
[facility] email [email or web address]

## 2019-11-09 NOTE — PROGRESS NOTE ADULT - ASSESSMENT
70 yo F with PMHx of hypothyroidism, tobacco abuse, chronic back pain, anxiety, ?alzheimers dementia p/w sepsis 2/2 LLL PNA.    # Severe sepsis: resolved  # LLL PNA: improving   - on day  continue Cefepime and doxycycline, day 4/7 of Abx today  - Plan to switch to PO tomorrow (ceftin + doxy)  - ID consult appreciated    #Acute on chronic COPD exacerbation/ Former smoker  - continue slow steroid taper  - continue Duonebs    #Acute hypoxic respiratory failure: resolved, currently on RA. 2/2 PNA and COPD   - continue PNA and COPD exacerbation treatment    #Lung nodules vs Lung mass recently diagnosed   - high suspicion for malignancy   - Outpatient PET scan to evaluate lung lesions  - Pulmonary consult appreciated     #LE cellulitis/wound s/p Skin graft  - Dr. Evans wound following: continue every other day xeroform dressing change     #Hypothyroidism  - continue synthroid   - check TSH    #Dementia:  - continue Aricept and Namenda    #Anxiety:  - continue Xanax 3mg TID  - Psych consult pending    #DVT prophylaxis:  - on lovenox 68 yo F with PMHx of hypothyroidism, tobacco abuse, chronic back pain, anxiety, ?alzheimers dementia p/w sepsis 2/2 LLL PNA.    # Severe sepsis: resolved  # LLL PNA: improving   - on day 5/7 of Abx  - plan for dc today on ceftin and  doxy to complete total 7 day course  - ID recs appreciated    #Acute on chronic COPD exacerbation/ Former smoker  - continue slow steroid taper  - continue Duonebs    #Acute hypoxic respiratory failure: resolved, currently on RA. 2/2 PNA and COPD   - continue PNA treatment - to complete 7 day course of abx  - continue steroid taper for COPD    #Lung nodules vs Lung mass recently diagnosed   - high suspicion for malignancy   - Outpatient PET scan to evaluate lung lesions  - Pulmonary outpatinet f/u  #LE cellulitis/wound s/p Skin graft  - Dr. Evans wound following: continue every other day xeroform dressing change     #Hypothyroidism  - continue synthroid   - check TSH    #Dementia:  - continue Aricept and Namenda    #Anxiety:  - continue Xanax 3mg TID  - Psych consult pending    #DVT prophylaxis:  - on lovenox 70 yo F with PMHx of hypothyroidism, tobacco abuse, chronic back pain, anxiety, ?alzheimers dementia p/w sepsis 2/2 LLL PNA.    # Severe sepsis: resolved  # LLL PNA: improving   - on day 5/7 of Abx  - plan for dc today on ceftin and  doxy to complete total 7 day course  - ID recs appreciated    #Acute on chronic COPD exacerbation/ Former smoker  - continue slow steroid taper  - continue Duonebs PRN    #Acute hypoxic respiratory failure: resolved, currently on RA. 2/2 PNA and COPD   - continue PNA treatment - to complete 7 day course of abx  - continue steroid taper for COPD    #Lung nodules vs Lung mass recently diagnosed   - high suspicion for malignancy   - Outpatient PET scan to evaluate lung lesions  - Pulmonary outpatient f/u    #LE cellulitis/wound s/p Skin graft  - Dr. Evans wound following: continue every other day xeroform dressing change     #Hypothyroidism  - continue synthroid   - f/u TSH    #Dementia:  - continue Aricept and Namenda    #Anxiety:  - continue Xanax 3mg TID      #DVT prophylaxis:  - on lovenox

## 2019-11-09 NOTE — PROGRESS NOTE ADULT - SUBJECTIVE AND OBJECTIVE BOX
CC: f/u for pneumonia    Patient is feeling better, she is sitting up in chair     REVIEW OF SYSTEMS:  All other review of systems negative (Comprehensive ROS)    Antimicrobials Day #  :5/7  cefepime   IVPB 1000 milliGRAM(s) IV Intermittent every 12 hours  doxycycline IVPB 100 milliGRAM(s) IV Intermittent every 12 hours  doxycycline IVPB        Other Medications Reviewed    Vital Signs Last 24 Hrs  T(C): 36.8 (09 Nov 2019 05:00), Max: 36.8 (09 Nov 2019 05:00)  T(F): 98.3 (09 Nov 2019 05:00), Max: 98.3 (09 Nov 2019 05:00)  HR: 75 (09 Nov 2019 05:00) (68 - 92)  BP: 109/64 (09 Nov 2019 05:00) (100/65 - 142/80)  BP(mean): --  RR: 16 (09 Nov 2019 05:00) (16 - 17)  SpO2: 93% (09 Nov 2019 05:00) (93% - 97%)    PHYSICAL EXAM:  General: alert, no acute distress  Eyes:  anicteric, no conjunctival injection, no discharge  Oropharynx: no lesions or injection 	  Neck: supple, without adenopathy  Lungs: basilar rales to auscultation  Heart: regular rate and rhythm; no murmur, rubs or gallops  Abdomen: soft, nondistended, nontender, without mass or organomegaly  Skin: no lesions  Extremities: no clubbing, cyanosis, or edema  Neurologic: alert, emotionally labile, moves all extremities    LAB RESULTS:                        10.7   3.88  )-----------( 131      ( 09 Nov 2019 06:14 )             33.0                           11.6   6.26  )-----------( 145      ( 08 Nov 2019 06:05 )             36.7     11-08    143  |  107  |  21  ----------------------------<  84  3.6   |  30  |  0.67    Ca    8.5      08 Nov 2019 06:05          MICROBIOLOGY:  RECENT CULTURES:  11-05 @ 09:50 .Blood Blood-Peripheral   No growth to date.          RADIOLOGY REVIEWED:  < from: CT Chest No Cont (11.01.19 @ 14:53) >  EXAM:  CT CHEST        PROCEDURE DATE:  11/01/2019          INTERPRETATION:  CLINICAL INFORMATION: Hyperinflated lungs. Follow-up to   chest radiograph.    COMPARISON: Chest radiographs, the latest dated 9/5/2019, CT abdomen   pelvis 4/10/2016.    PROCEDURE:   CT of the Chest was performed without intravenous contrast.  Sagittal and coronal reformats were performed.      FINDINGS:    LUNGS AND AIRWAYS: Patent central airways.  There is severe changes of   centrilobular emphysema with architectural distortion. There are   irregular nodular opacities in the left lower lobe with adjacent   tree-in-bud like opacities, most likely of infectious or in inflammatory   etiology. The largest of these nodular components in a segmental   distribution measures 1.1 x 0.7 cm on image #107/series 3. There is   another subpleural irregular nodule in the left lower lobe on image   #101/series 3 measuring 0.9 x 0.8 cm. The largest nodule is in the left   upper lobe peripherally, abutting the pleura and left main fissure and   contiguous with the left hilum, measuring 2.3 x 1.5 cm.     PLEURA: No pleural effusion.    MEDIASTINUM AND JEFERSON: No lymphadenopathy.    VESSELS: The ascending aorta is mildly ectatic measuring 3.7 x 3.8 cm at   its widest axial dimension. The main pulmonary arteries normal in caliber.    HEART: Heart size is normal. There is aortic valvular calcification. No   pericardial effusion.    CHEST WALL AND LOWER NECK: The thyroid gland is small in size. There is   no supraclavicular or axillary lymphadenopathy. There are however several   subcentimeter bilateral axillary lymph nodes.    VISUALIZED UPPER ABDOMEN: The adrenal glands are within normal limits.   The imaged portions of the unenhanced liver and kidneys are within normal   limits. The spleen is enlarged.    BONES: Multilevel degenerative change of the thoracic spine with   osteophytes, degenerative disc disease and facet joint arthropathy.    IMPRESSION:     Irregular solid nodules in the left lung, the largest inthe left upper   lobe measures 2.3 x 1.5 cm, and is contiguous with the left hilum. In the   setting of emphysema, malignancy cannot be excluded and further   assessment is recommended.    Additional smaller irregular nodules in the left lower lobe, some with   tree-in-bud distribution, these may be of infectious or inflammatory   etiology.    Justyn Vaughn at Dr. Gudino's office stated that she will bring the report   to his attention.    An email with these findings was sent to Dr. JEAN GUDINO 11/1/2019   3:33 PM by Dr. Seymour with read back confirmation. Spoke to Dr. Gudino   at approximately 3:38 PM.        < end of copied text >    < from: Xray Chest 1 View- PORTABLE-Routine (11.07.19 @ 09:20) >  EXAM:  XR CHEST PORTABLE ROUTINE 1V      PROCEDURE DATE:  11/07/2019        INTERPRETATION:  Clinical indication:  eval pna fever    Technique: XR CHEST portable AP    Comparison:11/5/2019.    Findings:  Lines: None    Heart/Mediastinum/Lungs: The heart size is normal. There is patchy   airspace disease in the left lower lobe, compatible with pneumonia. The   right lung remains relatively clear.There are no pleural effusions.    Impression:    As above.      < end of copied text >

## 2019-11-13 PROBLEM — F41.9 ANXIETY DISORDER, UNSPECIFIED: Chronic | Status: ACTIVE | Noted: 2019-11-05

## 2019-11-13 PROBLEM — I10 ESSENTIAL (PRIMARY) HYPERTENSION: Chronic | Status: ACTIVE | Noted: 2019-11-05

## 2019-11-20 ENCOUNTER — FORM ENCOUNTER (OUTPATIENT)
Age: 69
End: 2019-11-20

## 2019-11-21 ENCOUNTER — OUTPATIENT (OUTPATIENT)
Dept: OUTPATIENT SERVICES | Facility: HOSPITAL | Age: 69
LOS: 1 days | End: 2019-11-21
Payer: MEDICARE

## 2019-11-21 ENCOUNTER — APPOINTMENT (OUTPATIENT)
Dept: NUCLEAR MEDICINE | Facility: CLINIC | Age: 69
End: 2019-11-21
Payer: MEDICARE

## 2019-11-21 DIAGNOSIS — C85.10 UNSPECIFIED B-CELL LYMPHOMA, UNSPECIFIED SITE: ICD-10-CM

## 2019-11-21 DIAGNOSIS — Z98.890 OTHER SPECIFIED POSTPROCEDURAL STATES: Chronic | ICD-10-CM

## 2019-11-21 PROCEDURE — 78815 PET IMAGE W/CT SKULL-THIGH: CPT

## 2019-11-21 PROCEDURE — 78815 PET IMAGE W/CT SKULL-THIGH: CPT | Mod: 26,PI

## 2019-11-21 PROCEDURE — A9552: CPT

## 2020-01-30 NOTE — DISCHARGE NOTE PROVIDER - HOSPITAL COURSE
Patient came in for BP check. After obtaining the patients blood pressure readings she informed me that she did not take her BP medication this morning. Informed pt that taking her BP today will not be accurate due to her not taking the medication and it would not be accurate to decide if any medication might need to be adjusted since she did not take the medication. Scheduled patient to return in 1 wk for a BP check since she did not take her medication. Carefully instructed the patient to take her blood pressure medication before she comes in for the visit. Patient verbalized understanding.     BP readings from today.    Attempt 1:  BP: 164/86    Attempt 2:  BP: 160/74    Pulse:73  O2: 96   Patient is a 69y old  female s/p fall.  further diagnostic workup which includes ct scan of pelvis revealed left  pubis fx. Pt seen and examined and films reviewed by Dr Saldivar.  This is a nonsurgical case which requires conservative treatment to include PT/OT and pain management. Pt is WBAT. No further orthopedic intervention at this time per Dr saldivar.        Pt. also with a chronic right lower ext leg wound/ulcer.  Pt. seen by Plastic surgery, no surgical intervention recommended; local wound care.  MRI of right lower ext recommended; can do as outpatient.

## 2020-02-05 NOTE — DIETITIAN INITIAL EVALUATION ADULT. - WEIGHT IN LBS
120 96.4 96.5 Bcc Pigmented Histology Text: The dermis contains distinctive tumor cell masses of various shapes and sizes composed of cells with large oval or elongated nuclei with relatively little cytoplasm.  The nuclei are homogenous.  There is no pronounced variation in size or intensity of staining and no significant anaplastic appearance.  The cells at the outer aspect of the tumor masses show palisading of nuclei.  Tumor masses are surrounded by a connective tissue stroma and in some areas there is retraction artifact of the tumor nodule away from the surrounding stroma.  Pigmented tumor cells are noted.

## 2020-03-18 NOTE — ED ADULT NURSE NOTE - PMH
Detail Level: Detailed
Detail Level: Generalized
Detail Level: Simple
Patient Specific Counseling (Will Not Stick From Patient To Patient): Pt counseled on necessity of Lymph node dissection secondary to surgical excision of lesion \\nPET scan negative \\nSurgical excision complete, Pt referred back to Surgical Oncology for SNL biopsy
Alzheimer disease    Chronic back pain    Hypothyroid    Wound

## 2020-03-31 ENCOUNTER — EMERGENCY (EMERGENCY)
Facility: HOSPITAL | Age: 70
LOS: 1 days | Discharge: HOME | End: 2020-03-31
Attending: INTERNAL MEDICINE | Admitting: INTERNAL MEDICINE
Payer: MEDICARE

## 2020-03-31 VITALS
SYSTOLIC BLOOD PRESSURE: 98 MMHG | OXYGEN SATURATION: 96 % | TEMPERATURE: 96 F | WEIGHT: 130.07 LBS | DIASTOLIC BLOOD PRESSURE: 64 MMHG | HEART RATE: 62 BPM | HEIGHT: 64 IN | RESPIRATION RATE: 14 BRPM

## 2020-03-31 VITALS
DIASTOLIC BLOOD PRESSURE: 60 MMHG | OXYGEN SATURATION: 96 % | TEMPERATURE: 102 F | HEART RATE: 140 BPM | SYSTOLIC BLOOD PRESSURE: 107 MMHG | RESPIRATION RATE: 20 BRPM

## 2020-03-31 DIAGNOSIS — Z98.890 OTHER SPECIFIED POSTPROCEDURAL STATES: Chronic | ICD-10-CM

## 2020-03-31 LAB
ALBUMIN SERPL ELPH-MCNC: 2.7 G/DL — LOW (ref 3.3–5)
ALP SERPL-CCNC: 66 U/L — SIGNIFICANT CHANGE UP (ref 40–120)
ALT FLD-CCNC: 40 U/L — SIGNIFICANT CHANGE UP (ref 10–45)
ANION GAP SERPL CALC-SCNC: 7 MMOL/L — SIGNIFICANT CHANGE UP (ref 5–17)
AST SERPL-CCNC: 91 U/L — HIGH (ref 10–40)
BASOPHILS # BLD AUTO: 0.03 K/UL — SIGNIFICANT CHANGE UP (ref 0–0.2)
BASOPHILS NFR BLD AUTO: 1 % — SIGNIFICANT CHANGE UP (ref 0–2)
BILIRUB SERPL-MCNC: 0.3 MG/DL — SIGNIFICANT CHANGE UP (ref 0.2–1.2)
BUN SERPL-MCNC: 51 MG/DL — HIGH (ref 7–23)
CALCIUM SERPL-MCNC: 9 MG/DL — SIGNIFICANT CHANGE UP (ref 8.4–10.5)
CHLORIDE SERPL-SCNC: 105 MMOL/L — SIGNIFICANT CHANGE UP (ref 96–108)
CO2 BLDA-SCNC: 28 MMOL/L — SIGNIFICANT CHANGE UP (ref 22–30)
CO2 SERPL-SCNC: 32 MMOL/L — HIGH (ref 22–31)
CREAT SERPL-MCNC: 1.18 MG/DL — SIGNIFICANT CHANGE UP (ref 0.5–1.3)
EOSINOPHIL # BLD AUTO: 0.02 K/UL — SIGNIFICANT CHANGE UP (ref 0–0.5)
EOSINOPHIL NFR BLD AUTO: 0.7 % — SIGNIFICANT CHANGE UP (ref 0–6)
GAS PNL BLDA: SIGNIFICANT CHANGE UP
GLUCOSE SERPL-MCNC: 95 MG/DL — SIGNIFICANT CHANGE UP (ref 70–99)
HCT VFR BLD CALC: 43.2 % — SIGNIFICANT CHANGE UP (ref 34.5–45)
HGB BLD-MCNC: 13.5 G/DL — SIGNIFICANT CHANGE UP (ref 11.5–15.5)
HOROWITZ INDEX BLDA+IHG-RTO: SIGNIFICANT CHANGE UP
IMM GRANULOCYTES NFR BLD AUTO: 1.7 % — HIGH (ref 0–1.5)
LYMPHOCYTES # BLD AUTO: 1.37 K/UL — SIGNIFICANT CHANGE UP (ref 1–3.3)
LYMPHOCYTES # BLD AUTO: 47.6 % — HIGH (ref 13–44)
MCHC RBC-ENTMCNC: 26.4 PG — LOW (ref 27–34)
MCHC RBC-ENTMCNC: 31.3 GM/DL — LOW (ref 32–36)
MCV RBC AUTO: 84.5 FL — SIGNIFICANT CHANGE UP (ref 80–100)
MONOCYTES # BLD AUTO: 0.38 K/UL — SIGNIFICANT CHANGE UP (ref 0–0.9)
MONOCYTES NFR BLD AUTO: 13.2 % — SIGNIFICANT CHANGE UP (ref 2–14)
NEUTROPHILS # BLD AUTO: 1.03 K/UL — LOW (ref 1.8–7.4)
NEUTROPHILS NFR BLD AUTO: 35.8 % — LOW (ref 43–77)
NRBC # BLD: 0 /100 WBCS — SIGNIFICANT CHANGE UP (ref 0–0)
PCO2 BLDA: 47 MMHG — HIGH (ref 32–46)
PH BLDA: 7.36 — SIGNIFICANT CHANGE UP (ref 7.35–7.45)
PLATELET # BLD AUTO: 125 K/UL — LOW (ref 150–400)
PO2 BLDA: 76 MMHG — SIGNIFICANT CHANGE UP (ref 74–108)
POTASSIUM SERPL-MCNC: 4.3 MMOL/L — SIGNIFICANT CHANGE UP (ref 3.5–5.3)
POTASSIUM SERPL-SCNC: 4.3 MMOL/L — SIGNIFICANT CHANGE UP (ref 3.5–5.3)
PROT SERPL-MCNC: 7.9 G/DL — SIGNIFICANT CHANGE UP (ref 6–8.3)
RBC # BLD: 5.11 M/UL — SIGNIFICANT CHANGE UP (ref 3.8–5.2)
RBC # FLD: 13.9 % — SIGNIFICANT CHANGE UP (ref 10.3–14.5)
SAO2 % BLDA: 94 % — SIGNIFICANT CHANGE UP (ref 92–96)
SODIUM SERPL-SCNC: 144 MMOL/L — SIGNIFICANT CHANGE UP (ref 135–145)
WBC # BLD: 2.88 K/UL — LOW (ref 3.8–10.5)
WBC # FLD AUTO: 2.88 K/UL — LOW (ref 3.8–10.5)

## 2020-03-31 PROCEDURE — 71045 X-RAY EXAM CHEST 1 VIEW: CPT | Mod: 26

## 2020-03-31 PROCEDURE — 99284 EMERGENCY DEPT VISIT MOD MDM: CPT

## 2020-03-31 RX ORDER — ACETAMINOPHEN 500 MG
650 TABLET ORAL ONCE
Refills: 0 | Status: COMPLETED | OUTPATIENT
Start: 2020-03-31 | End: 2020-03-31

## 2020-03-31 RX ORDER — AZITHROMYCIN 500 MG/1
1 TABLET, FILM COATED ORAL
Qty: 4 | Refills: 0
Start: 2020-03-31 | End: 2020-04-03

## 2020-03-31 RX ORDER — AZITHROMYCIN 500 MG/1
500 TABLET, FILM COATED ORAL ONCE
Refills: 0 | Status: COMPLETED | OUTPATIENT
Start: 2020-03-31 | End: 2020-03-31

## 2020-03-31 RX ADMIN — AZITHROMYCIN 500 MILLIGRAM(S): 500 TABLET, FILM COATED ORAL at 18:34

## 2020-03-31 RX ADMIN — Medication 650 MILLIGRAM(S): at 22:01

## 2020-03-31 NOTE — ED PROVIDER NOTE - CARE PLAN
Principal Discharge DX:	Coronavirus infection Principal Discharge DX:	Coronavirus infection  Secondary Diagnosis:	Viral pneumonia

## 2020-03-31 NOTE — ED PROVIDER NOTE - ENMT, MLM
5
Airway patent, Nasal mucosa clear. Mouth with normal mucosa. Throat has no vesicles, no oropharyngeal exudates and uvula is midline.

## 2020-03-31 NOTE — ED PROVIDER NOTE - PATIENT PORTAL LINK FT
You can access the FollowMyHealth Patient Portal offered by [facility] by registering at the following website: [email or web address]. By joining [employer]’s FollowMyHealth portal, you will also be able to view your health information using other applications (apps) compatible with our system.

## 2020-03-31 NOTE — ED PROVIDER NOTE - OBJECTIVE STATEMENT
71 yo female, hx COPD, alzheimer, htn,  recent COVID positive, comes to the ED from [facility] for increased lethargy and hypoxia. Pt admits to cough, sob. Denies any fevers, n/v, c pain , abd pain, or any other complaints.  + smoker

## 2020-03-31 NOTE — ED PROVIDER NOTE - ATTENDING CONTRIBUTION TO CARE
69 yo female, hx COPD, alzheimer, htn,  recent COVID positive, comes to the ED from [facility] for increased lethargy and hypoxia. Pt admits to cough, sob. Denies any fevers, n/v, c pain , abd pain, or any other complaints.  + smoker  Will Check labs, blood gas, cxr, re-eval    blood gas ok, pt + pneumonia on cxr, + covid from [facility], bitals good , sat at 98% will dc home on Zithromax 4 more days   Dr. Espino:  I have reviewed and discussed with the PA/ resident the case specifics, including the history, physical assessment, evaluation, conclusion, laboratory results, and medical plan. I agree with the contents, and conclusions. I have personally examined, and interviewed the patient.

## 2020-03-31 NOTE — ED PROVIDER NOTE - CPE EDP RESP NORM
pt had back fusion six years ago, has had problems losing urine for months,. Pt bent over in the shower and states she felt like something shifted and now has lower back pain
- - -

## 2020-03-31 NOTE — ED PROVIDER NOTE - CLINICAL SUMMARY MEDICAL DECISION MAKING FREE TEXT BOX
69 yo female, hx COPD, alzheimer, htn,  recent COVID positive, comes to the ED from [facility] for increased lethargy and hypoxia. Pt admits to cough, sob. Denies any fevers, n/v, c pain , abd pain, or any other complaints.  + smoker  Will Check labs, blood gas, cxr, re-eval 71 yo female, hx COPD, alzheimer, htn,  recent COVID positive, comes to the ED from [facility] for increased lethargy and hypoxia. Pt admits to cough, sob. Denies any fevers, n/v, c pain , abd pain, or any other complaints.  + smoker  Will Check labs, blood gas, cxr, re-eval    blood gas ok, pt + pneumonia on cxr, + covid from [facility], bitals good , sat at 98% will dc home on Zithromax 4 more days as dw attending rashi

## 2020-03-31 NOTE — ED PROVIDER NOTE - CONSTITUTIONAL, MLM
IM Residency Progress Note   Unit/Bed#: [facility] 705-01 Encounter: [id number]  SOD Team C       Mariefr Alcocer 50 y o  female [facility] Stay Days: 3      Assessment/Plan:    Principal Problem:    Acute ischemic right MCA stroke [facility])  Active Problems:    Fall    Forehead contusion    Left elbow contusion    1  Right MCA stroke status post tPA and thrombectomy  · Goal systolic blood pressure between 120 and 160 - patient intermittently below goal on midodrine  · MRI 2/10/18 showed evidence of petechial hemorrhage was delayed initiation of aspirin  · CT head preformed 2/10/18 :No intraparenchymal hematoma , evolving infarct right basal ganglia, right insular region, right posterior parietal region, and  no hematoma seen corresponding to the petechial hemorrhage detected on the recent to MRI in the right basal ganglionic region  · Start aspirin and DVT ppx with heparin  · Echo showed EF 65% no regional wall abnormalities with a normal left atrium  · Continue atorvastatin 80 mg  · Q4 neuro checks   · Continue telemetry monitoring as atrial fibrillation with embolic stroke remains probability  · Mitodrine TID with goal MAP greater than 65  · Continue Dysphagia 3 diet pending Speech swallow eval     2   Suicide ideation  · Patient was noted earlier in hospitalization to have ideation and plan  · Continue mirtazapine 15 mg p o  q h s  · Patient denies further ideation      3  Cephalalgia  · Continue Tylenol 650 mg q 6h  · Gabapentin 100 BID     4  Hypotension  · Mitodrine TID with goal MAP greater than 65, -160     5  Bradycardia  · Asymptomatic-continue to monitor on telemetry  Disposition: Continued medical management of MCA stoke  PT assessment and placement planning  Subjective:   Patient feels tired  She reports no difficulty sleeping  She states that she is speaking softly so that her headache does not return  She reports no headache  She reports no focal weakness      No overnight events per nursing staff  Vitals: Temp (24hrs), Av 6 °F (37 °C), Min:98 1 °F (36 7 °C), Max:99 2 °F (37 3 °C)  Current: Temperature: 98 2 °F (36 8 °C)  Vitals:    18 2323 18 0254 18 0532 18 0733   BP: 112/55 98/52 104/56 (!) 113/5   BP Location: Right arm Right arm Right arm Right arm   Pulse: 60 63 61 (!) 52   Resp:    Temp: 98 8 °F (37 1 °C) 98 1 °F (36 7 °C)  98 2 °F (36 8 °C)   TempSrc: Oral Oral  Oral   SpO2: 98% 97%  97%   Weight:       Height:        Body mass index is 23 66 kg/m²  I/O last 24 hours: In: 1540 [P O :1540]  Out: 2333 [Urine:1550]      Physical Exam:   Gen: Lying in bed - intermittent closing of eyes  HEENT: Anicteric, no injection, ecchymosis lateral to left orbit  CV: RRR, DP 2+ BL  Pulm: CTA BL  Extr: No LE edema      Invasive Devices     Peripheral Intravenous Line            Peripheral IV 02/10/18 Left Forearm 2 days    Peripheral IV 18 Right Forearm 1 day                Labs: No results found for this or any previous visit (from the past 24 hour(s))  Radiology Results: I have personally reviewed pertinent reports  and I have personally reviewed pertinent films in PACS  Xr Elbow 2 Vw Left    Result Date: 2018  Impression: No acute osseous abnormality  Workstation performed: [id number]     Ct Head Wo Contrast    Result Date: 2/10/2018  Impression: No intraparenchymal hematoma seen Evolving infarct right basal ganglia, right insular region, right posterior parietal region  There is no hematoma seen corresponding to the petechial hemorrhage detected on the recent to MRI in the right basal ganglionic region  Workstation performed: [id number]     Trauma - Ct Spine Cervical Wo Contrast    Result Date: 2018  Impression: Degenerative and postoperative changes  No cervical spine fracture or traumatic malalignment   I personally discussed this study with Binh Alfaro on 2018 at 7:47 AM   Workstation performed: [id number] Mri Brain Wo Contrast    Result Date: 2/10/2018  Impression: Multiple infarcts in the right cerebral hemisphere in the distribution of the right MCA  Deep infarct involving the basal ganglia and cortical infarcts involving the frontal and parietal region are noted  There is a petechial hemorrhage in the infarct noted in the right lentiform nucleus Reconstitution of the flow void in the right MCA post embolectomy No intraparenchymal hematoma seen No midline shift seen  I personally discussed this study with Dr Gricelda Pedraza on 2/10/2018 at 8:50 AM  Workstation performed: [id number]     Xr Chest 1 View    Result Date: 2/12/2018  Impression: No acute traumatic injury No active cardiopulmonary disease  Workstation performed: [id number]     Xr Trauma Multiple    Result Date: 2/9/2018  Impression: No acute traumatic injury No active cardiopulmonary disease  Workstation performed: [id number]     Ct Stroke Alert Brain    Result Date: 2/9/2018  Impression: 1  Findings suspicious for evolving right MCA territory infarction  Recommend follow-up CTA of the neck and brain  2   Cerebral atrophy with chronic small vessel ischemic change  I personally discussed this study with Oumar Balderas on 2/9/2018 at 7:47 AM  Workstation performed: [id number]     Cta Stroke Alert (head/neck)    Result Date: 2/9/2018  Impression: 1  Occlusion of the M1 segment of the right middle cerebral artery  There is reconstitution of the M2 segments via collateral flow from the anterior and posterior circulations  2   Evolving right MCA territory infarction  No acute hemorrhage  3   Degenerative changes cervical spine     I personally discussed this study with Oumar Balderas on 2/9/2018 at 7:47 AM  Workstation performed: [id number]       Active Meds:   Current Facility-Administered Medications   Medication Dose Route Frequency    acetaminophen (TYLENOL) tablet 650 mg  650 mg Oral Q6H PRN    aspirin chewable tablet 81 mg  81 mg Oral Daily    atorvastatin (LIPITOR) tablet 80 mg  80 mg Oral QPM    docusate sodium (COLACE) capsule 100 mg  100 mg Oral BID    gabapentin (NEURONTIN) capsule 100 mg  100 mg Oral BID    heparin (porcine) subcutaneous injection 5,000 Units  5,000 Units Subcutaneous Q8H [address]    midodrine (PROAMATINE) tablet 5 mg  5 mg Oral TID AC    mirtazapine (REMERON) tablet 15 mg  15 mg Oral HS    ondansetron (ZOFRAN) injection 4 mg  4 mg Intravenous Q6H PRN    senna (SENOKOT) tablet 8 6 mg  1 tablet Oral Daily         VTE Pharmacologic Prophylaxis: Heparin  VTE Mechanical Prophylaxis: sequential compression device    Charlie Alberts - - -

## 2020-03-31 NOTE — ED PROVIDER NOTE - NSFOLLOWUPINSTRUCTIONS_ED_ALL_ED_FT
Complete the Zithromax 250 mg 1 tab daily for 4 more days starting tomorrow.     See attached for COVID-19 info / fact sheet.   Please stay home for 14 days. See work note and fact sheet.   Home quarantine is recommended to monitor symptoms.   Worsening cough, chest pain, shortness of breath, continued or ANY new concerning symptoms return to the emergency department.   We also included your information in the [facility] Database.

## 2020-04-01 PROCEDURE — 99284 EMERGENCY DEPT VISIT MOD MDM: CPT

## 2020-04-01 PROCEDURE — 36415 COLL VENOUS BLD VENIPUNCTURE: CPT

## 2020-04-01 PROCEDURE — 71045 X-RAY EXAM CHEST 1 VIEW: CPT

## 2020-04-01 PROCEDURE — 82803 BLOOD GASES ANY COMBINATION: CPT

## 2020-04-01 PROCEDURE — 80053 COMPREHEN METABOLIC PANEL: CPT

## 2020-04-01 PROCEDURE — 85027 COMPLETE CBC AUTOMATED: CPT

## 2020-04-01 PROCEDURE — 36600 WITHDRAWAL OF ARTERIAL BLOOD: CPT

## 2020-04-04 DIAGNOSIS — R06.89 OTHER ABNORMALITIES OF BREATHING: ICD-10-CM

## 2020-04-05 ENCOUNTER — INPATIENT (INPATIENT)
Facility: HOSPITAL | Age: 70
LOS: 0 days | Discharge: NURSING, REHAB OR LONG-TERM CARE | DRG: 177 | End: 2020-04-05
Attending: STUDENT IN AN ORGANIZED HEALTH CARE EDUCATION/TRAINING PROGRAM | Admitting: STUDENT IN AN ORGANIZED HEALTH CARE EDUCATION/TRAINING PROGRAM
Payer: COMMERCIAL

## 2020-04-05 VITALS
OXYGEN SATURATION: 98 % | RESPIRATION RATE: 18 BRPM | DIASTOLIC BLOOD PRESSURE: 78 MMHG | SYSTOLIC BLOOD PRESSURE: 109 MMHG | HEIGHT: 64 IN | WEIGHT: 123.9 LBS | HEART RATE: 83 BPM | TEMPERATURE: 98 F

## 2020-04-05 VITALS
DIASTOLIC BLOOD PRESSURE: 84 MMHG | OXYGEN SATURATION: 100 % | SYSTOLIC BLOOD PRESSURE: 121 MMHG | HEART RATE: 72 BPM | RESPIRATION RATE: 18 BRPM

## 2020-04-05 DIAGNOSIS — Z98.890 OTHER SPECIFIED POSTPROCEDURAL STATES: Chronic | ICD-10-CM

## 2020-04-05 DIAGNOSIS — N17.9 ACUTE KIDNEY FAILURE, UNSPECIFIED: ICD-10-CM

## 2020-04-05 LAB
ALBUMIN SERPL ELPH-MCNC: 3.3 G/DL — SIGNIFICANT CHANGE UP (ref 3.3–5)
ALP SERPL-CCNC: 74 U/L — SIGNIFICANT CHANGE UP (ref 40–120)
ALT FLD-CCNC: 35 U/L — SIGNIFICANT CHANGE UP (ref 10–45)
ANION GAP SERPL CALC-SCNC: 15 MMOL/L — SIGNIFICANT CHANGE UP (ref 5–17)
AST SERPL-CCNC: 47 U/L — HIGH (ref 10–40)
BASOPHILS # BLD AUTO: 0.06 K/UL — SIGNIFICANT CHANGE UP (ref 0–0.2)
BASOPHILS NFR BLD AUTO: 0.8 % — SIGNIFICANT CHANGE UP (ref 0–2)
BILIRUB SERPL-MCNC: 0.5 MG/DL — SIGNIFICANT CHANGE UP (ref 0.2–1.2)
BUN SERPL-MCNC: 38 MG/DL — HIGH (ref 7–23)
CALCIUM SERPL-MCNC: 9.8 MG/DL — SIGNIFICANT CHANGE UP (ref 8.4–10.5)
CHLORIDE SERPL-SCNC: 105 MMOL/L — SIGNIFICANT CHANGE UP (ref 96–108)
CK SERPL-CCNC: 131 U/L — SIGNIFICANT CHANGE UP (ref 25–170)
CO2 SERPL-SCNC: 20 MMOL/L — LOW (ref 22–31)
CREAT SERPL-MCNC: 1.85 MG/DL — HIGH (ref 0.5–1.3)
D DIMER BLD IA.RAPID-MCNC: 1154 NG/ML DDU — HIGH
EOSINOPHIL # BLD AUTO: 0 K/UL — SIGNIFICANT CHANGE UP (ref 0–0.5)
EOSINOPHIL NFR BLD AUTO: 0 % — SIGNIFICANT CHANGE UP (ref 0–6)
FERRITIN SERPL-MCNC: 326 NG/ML — HIGH (ref 15–150)
GLUCOSE SERPL-MCNC: 118 MG/DL — HIGH (ref 70–99)
HCT VFR BLD CALC: 49 % — HIGH (ref 34.5–45)
HGB BLD-MCNC: 15.6 G/DL — HIGH (ref 11.5–15.5)
IMM GRANULOCYTES NFR BLD AUTO: 2 % — HIGH (ref 0–1.5)
LDH SERPL L TO P-CCNC: 269 U/L — HIGH (ref 50–242)
LYMPHOCYTES # BLD AUTO: 1.71 K/UL — SIGNIFICANT CHANGE UP (ref 1–3.3)
LYMPHOCYTES # BLD AUTO: 23.8 % — SIGNIFICANT CHANGE UP (ref 13–44)
MCHC RBC-ENTMCNC: 26.3 PG — LOW (ref 27–34)
MCHC RBC-ENTMCNC: 31.8 GM/DL — LOW (ref 32–36)
MCV RBC AUTO: 82.6 FL — SIGNIFICANT CHANGE UP (ref 80–100)
MONOCYTES # BLD AUTO: 0.47 K/UL — SIGNIFICANT CHANGE UP (ref 0–0.9)
MONOCYTES NFR BLD AUTO: 6.6 % — SIGNIFICANT CHANGE UP (ref 2–14)
NEUTROPHILS # BLD AUTO: 4.79 K/UL — SIGNIFICANT CHANGE UP (ref 1.8–7.4)
NEUTROPHILS NFR BLD AUTO: 66.8 % — SIGNIFICANT CHANGE UP (ref 43–77)
NRBC # BLD: 0 /100 WBCS — SIGNIFICANT CHANGE UP (ref 0–0)
PLATELET # BLD AUTO: 233 K/UL — SIGNIFICANT CHANGE UP (ref 150–400)
POTASSIUM SERPL-MCNC: 3.9 MMOL/L — SIGNIFICANT CHANGE UP (ref 3.5–5.3)
POTASSIUM SERPL-SCNC: 3.9 MMOL/L — SIGNIFICANT CHANGE UP (ref 3.5–5.3)
PROT SERPL-MCNC: 9.6 G/DL — HIGH (ref 6–8.3)
RBC # BLD: 5.93 M/UL — HIGH (ref 3.8–5.2)
RBC # FLD: 13.3 % — SIGNIFICANT CHANGE UP (ref 10.3–14.5)
SODIUM SERPL-SCNC: 140 MMOL/L — SIGNIFICANT CHANGE UP (ref 135–145)
WBC # BLD: 7.17 K/UL — SIGNIFICANT CHANGE UP (ref 3.8–10.5)
WBC # FLD AUTO: 7.17 K/UL — SIGNIFICANT CHANGE UP (ref 3.8–10.5)

## 2020-04-05 PROCEDURE — 93010 ELECTROCARDIOGRAM REPORT: CPT

## 2020-04-05 PROCEDURE — 99285 EMERGENCY DEPT VISIT HI MDM: CPT | Mod: CS

## 2020-04-05 RX ORDER — LEVOTHYROXINE SODIUM 125 MCG
1 TABLET ORAL
Qty: 0 | Refills: 0 | DISCHARGE

## 2020-04-05 RX ORDER — ALBUTEROL 90 UG/1
2 AEROSOL, METERED ORAL
Qty: 0 | Refills: 0 | DISCHARGE

## 2020-04-05 RX ORDER — MEMANTINE HYDROCHLORIDE 10 MG/1
1 TABLET ORAL
Qty: 0 | Refills: 0 | DISCHARGE

## 2020-04-05 RX ORDER — SODIUM CHLORIDE 9 MG/ML
1000 INJECTION INTRAMUSCULAR; INTRAVENOUS; SUBCUTANEOUS ONCE
Refills: 0 | Status: COMPLETED | OUTPATIENT
Start: 2020-04-05 | End: 2020-04-05

## 2020-04-05 RX ORDER — SODIUM CHLORIDE 9 MG/ML
500 INJECTION INTRAMUSCULAR; INTRAVENOUS; SUBCUTANEOUS ONCE
Refills: 0 | Status: DISCONTINUED | OUTPATIENT
Start: 2020-04-05 | End: 2020-04-05

## 2020-04-05 RX ORDER — NICOTINE POLACRILEX 2 MG
1 GUM BUCCAL
Qty: 0 | Refills: 0 | DISCHARGE

## 2020-04-05 RX ORDER — ACETAMINOPHEN 500 MG
650 TABLET ORAL ONCE
Refills: 0 | Status: COMPLETED | OUTPATIENT
Start: 2020-04-05 | End: 2020-04-05

## 2020-04-05 RX ORDER — ALPRAZOLAM 0.25 MG
1.5 TABLET ORAL
Qty: 0 | Refills: 0 | DISCHARGE

## 2020-04-05 RX ORDER — ONDANSETRON 8 MG/1
1 TABLET, FILM COATED ORAL
Qty: 15 | Refills: 0
Start: 2020-04-05 | End: 2020-04-09

## 2020-04-05 RX ADMIN — SODIUM CHLORIDE 1000 MILLILITER(S): 9 INJECTION INTRAMUSCULAR; INTRAVENOUS; SUBCUTANEOUS at 19:00

## 2020-04-05 RX ADMIN — SODIUM CHLORIDE 1000 MILLILITER(S): 9 INJECTION INTRAMUSCULAR; INTRAVENOUS; SUBCUTANEOUS at 19:15

## 2020-04-05 RX ADMIN — SODIUM CHLORIDE 1000 MILLILITER(S): 9 INJECTION INTRAMUSCULAR; INTRAVENOUS; SUBCUTANEOUS at 17:42

## 2020-04-05 NOTE — ED PROVIDER NOTE - OBJECTIVE STATEMENT
71 yo female, hx COPD on [place] oxygen,  alzheimer, htn,  recent COVID positive, comes to the ED from [facility] for increased lethargy, cough, bodyaches and SOB x 3-4 days. Denies any fevers, n/v, c pain , abd pain, or any other complaints. Patient seen in the ED 5 days ago for similar had unremarkable labs and CXR with "significant left lower lobe infiltrate markedly increased from November 7, 2019. Minimal right base infiltrate is also likely". She was dc'd with zpak, reports she is taking it at the facility   + smoker

## 2020-04-05 NOTE — ED PROVIDER NOTE - CARE PLAN
Principal Discharge DX:	SHAY (acute kidney injury)  Secondary Diagnosis:	Viral pneumonia  Secondary Diagnosis:	Dysphagia, unspecified type Principal Discharge DX:	SHAY (acute kidney injury)  Secondary Diagnosis:	Viral pneumonia

## 2020-04-05 NOTE — ED PROVIDER NOTE - PROGRESS NOTE DETAILS
PHILIPPE AGOSTO: patient is tolerating PO intake, tolerable for dc home, she is hemodynamically stable, mentating well, at baseline. She is willing to be discharged home.

## 2020-04-05 NOTE — ED PROVIDER NOTE - ATTENDING CONTRIBUTION TO CARE
Dr. Roe AGOSTO: I performed a face to face bedside interview with patient regarding history of present illness, review of symptoms and past medical history. I completed an independent physical exam.  I have discussed patient's plan of care with PA.   I agree with note as stated above, having amended the EMR as needed to reflect my findings.   This includes HISTORY OF PRESENT ILLNESS, HIV, PAST MEDICAL/SURGICAL/FAMILY/SOCIAL HISTORY, ALLERGIES AND HOME MEDICATIONS, REVIEW OF SYSTEMS, PHYSICAL EXAM, and any PROGRESS NOTES during the time I functioned as the attending physician for this patient.

## 2020-04-05 NOTE — ED ADULT NURSE NOTE - OBJECTIVE STATEMENT
Pt arrived to ER via EMS from [facility] c/o SOB. Pt positive for COVID-19 and reports increase SOB and weakness. Pt arrived to ER via EMS from [facility] c/o SOB. Pt positive for COVID-19 and reports increase SOB and weakness. Pt has been on [place] oxygen at facility. Pt denies any abdominal pain, chest pain, chills, nausea or vomiting.

## 2020-04-05 NOTE — ED PROVIDER NOTE - CLINICAL SUMMARY MEDICAL DECISION MAKING FREE TEXT BOX
69 yo female, hx COPD on [place] oxygen,  alzheimer, htn,  recent COVID positive, comes to the ED from [facility] for increased lethargy, cough, bodyaches and SOB x 3-4 days. Denies any fevers, n/v, c pain , abd pain, or any other complaints. Patient seen in the ED 5 days ago for similar had unremarkable labs and CXR with "significant left lower lobe infiltrate markedly increased from November 7, 2019. Minimal right base infiltrate is also likely". She was dc'd with zpak, reports she is taking it at the facility. PE as noted above. labs pending 69 yo female, hx COPD on [place] oxygen,  alzheimer, htn,  recent COVID positive, comes to the ED from [facility] for increased lethargy, cough, bodyaches and SOB x 3-4 days. Denies any fevers, n/v, c pain , abd pain, or any other complaints. Patient seen in the ED 5 days ago for similar had unremarkable labs and CXR with "significant left lower lobe infiltrate markedly increased from November 7, 2019. Minimal right base infiltrate is also likely". She was dc'd with zpak, reports she is taking it at the facility. PE as noted above. labs reviewed, SHAY noted, inflammatory markers c/w covid elevated, patient not tolerating PO in the ED, couldn't swallow Tylenol or water. Patient admitted 71 yo female, hx COPD on [place] oxygen,  alzheimer, htn,  recent COVID positive, comes to the ED from [facility] for increased lethargy, cough, bodyaches and SOB x 3-4 days. Denies any fevers, n/v, c pain , abd pain, or any other complaints. Patient seen in the ED 5 days ago for similar had unremarkable labs and CXR with "significant left lower lobe infiltrate markedly increased from November 7, 2019. Minimal right base infiltrate is also likely". She was dc'd with zpak, reports she is taking it at the facility. PE as noted above. labs reviewed, SHAY noted, inflammatory markers c/w covid elevated, patient not tolerating PO in the ED, couldn't swallow Tylenol or water. Patient admitted    PHILIPPE AGOSTO: 70F Hx of pelvic fx, Alzheimer disease, Anxiety, Chronic back pain, COPD, HTN, Hypothyroid p/w severe fatigue x 4 days. (+) COVID at assisted living. aw decreased appetite, nausea, and myalgias. Denies any chest pain, sob, headaches, neck pain, fevers, abdominal pain, diarrhea. Vital Signs: Reviewed, Gen: NAD, Appears cachetic, tired appearing, sunked eyes. HEENT: Airway patent,DRY mm no erythema/swelling/deformity of oral structures. EOMI, PERRLA 3mm, No tender or pathologic cervical LAD. CV: RRR, no m/g/r, well-perfused extremities, 2+ b/l DP and radial pulses equal. Lungs: CTABL, no pursed lip breathing, retractions or tripodding, no focal areas of decreased breath sounds, no stridor, managing secretions. no wheezing, rhonchi, rales. ABD: SOFT, NT, ND, no guarding or rebound, no pulsatile mass, no hernias. MSK: Neck supple, nontender, nl ROM, no stepoff. Back: NT TLS spine or to b/l bony structures or CVAT B/L. EXT: NT, nl rom, no deformity. Skin: warm, dry, no rash. NEURO: A&Ox3, CN II-XII intact, normal strength 5/5 all 4 ext, nl sensation throughout, normal speech and coordination. PLAN: admit for covid, weakness, dehydration, unable to tolerate PO. IVF/labs.

## 2020-04-05 NOTE — ED PROVIDER NOTE - NSFOLLOWUPINSTRUCTIONS_ED_ALL_ED_FT
1. You were seen in the ED and underwent testing for the novel coronarvirus (COVID-19). The results are not back yet and you will be contacted with the result which can take up to 7 days. You were also tested for other common viruses such as the flu and cold viruses. You will be notified if you test positive for any of these.    2. Until your test results are back, YOU MUST SELF-QUARANTINE until you are told to other otherwise by [facility] or the local [facility] department. This is extremely important to limit the spread of this virus. Please refer closely to the packet provided to you on the specifics of the process of self-quarantine.    3. If you end up testing positive for the virus, you will instructed as to when you can return to your usual activities. If you do not hear from anyone in 7 days, please call [phone] or your local health department for guidance.     4. Return to the ED for difficulty breathing.    5. You may over the counter acetaminophen (Tylenol) 650mg every 6 hours as needed for fever or pain. There is some concern in the medical community about using ibuprofen (Advil, Motrin) and other NSAIDs in people with COVID infections and until there is more research on this subject it may be best to avoid NSAIDs like ibuprofen at the moment unless you have an allergy to acetaminophen (Tylenol).  Do NOT exceed 3500mg acetaminophen in 24 hours.  Please do not take these medications if you do not have pain or fever or if you have any history of liver disease.     -------------    What is a coronavirus?  Coronaviruses are a large family of viruses that cause illnesses ranging from the common cold to more severe diseases such as Middle East Respiratory Syndrome (MERS) and Severe Acute Respiratory Syndrome (SARS).    What is Novel Coronavirus (COVID-19)?  The Centers for Disease Control and Prevention (CDC) is closely monitoring the outbreak caused by COVID-19. For the latest information about COVID-19, visit the CDC website at CDC.gov/Coronavirus    How are coronaviruses spread?  Coronaviruses can be transmitted from person to person, usually after close contact with an infected  person (for example, in a household, workplace, or healthcare setting), via droplets that become airborne after a cough or sneeze. These droplets can then infect a nearby person. Transmission can also occur by touching recently contaminated surfaces.    Is there a treatment for a COVID-19?  There is no specific treatment for disease caused by COVID-19. However, many of the symptoms can be treated based on the patient’s clinical condition. Supportive care for infected persons can be highly effective.    What are the symptoms of coronavirus infection?  It depends on the virus, but common signs include fever and/or respiratory symptoms such as cough and shortness of breath. In more severe cases, infection can cause pneumonia, severe acute respiratory syndrome, kidney failure and even death. Fortunately, most cases of COVID-19 have an illness no different than the influenza (flu), with a majority of these patients having mild symptoms and overall mortality which appears to be not much different than the flu.    What can I do to protect myself?  The best precautionary measures:  – washing your hands  – covering your cough  – disinfecting surfaces  – it is also advisable to avoid close contact with anyone showing symptoms of respiratory illness such as coughing and sneezing  – those with symptoms should wear a surgical mask when around others    What can I do to protect those around me?  If you have been identified as someone who may be infected with COVID-19, we recommend you follow the self-isolation procedures outlined on the following page to protect those around you and to limit the spread of this virus.    We recommend the below precautionary steps from now until 14 days from when you returned from your travel or date of your last known possible contact:    — Do not go to work, school or public areas. Avoid using public transportation, ridesharing or taxis.  — As much as possible, separate yourself from other people in your home. If you can, you should stay in a room and away from other people. Also, you should use a separate bathroom if available.  — Wear the supplied mask whenever you are around other people.  — If you have a non-urgent medical appointment, please reschedule for a later date. If the appointment is urgent, please call the health care provider and tell them that you are on self-isolation for possible COVID-19. This will help the health care provider’s office take steps to keep other people from getting infected or exposed. If you can reschedule routine appointments, do so.  — Wash your hands often with soap and water for at least 15 to 20 seconds or clean your hands with an alcohol-based hand  that contains 60 to 95% alcohol, covering all surfaces of your hands and rubbing them together until they feel dry. Soap and water should be used preferentially if hands are visibly dirty.  — Cover your mouth and nose with a tissue when you cough or sneeze. Throw used tissues in a lined trash can. Immediately wash your hands.  — Avoid touching your eyes, nose, and mouth with your hands.  — Avoid sharing personal household items. You should not share dishes, drinking glasses, cups, eating utensils, towels, or bedding with other people or pets in your home. After using these items, they should be washed thoroughly with soap and water.  — Clean and disinfect all “high-touch” surfaces every day. High touch surfaces include counters, tabletops, doorknobs, light switches, remote controls, bathroom fixtures, toilets, phones, keyboards, tablets, and bedside tables. Also, clean any surfaces that may have blood, stool, or body fluids on them.

## 2020-04-05 NOTE — ED PROVIDER NOTE - PATIENT PORTAL LINK FT
You can access the FollowMyHealth Patient Portal offered by [facility] by registering at the following website: [email or web address]. By joining [facility]’s FollowMyHealth portal, you will also be able to view your health information using other applications (apps) compatible with our system.

## 2020-04-05 NOTE — H&P ADULT - HISTORY OF PRESENT ILLNESS
69 yo female, hx COPD on [place] oxygen,  alzheimer, htn,  recent COVID positive ( 5 days ago), comes to the ED from [facility] for increased lethargy, cough, bodyaches and SOB x 3-4 days. Denies any fevers, n/v, c pain , abd pain, or any other complaints.   Patient seen in the ED 5 days ago for similar had unremarkable labs and CXR and sent back to [facility] and completed 5 day course of Zithromax.  Sent back from [facility] to ED for poor PO intake. Pt states she has had no appetite because she doesn't like the food.

## 2020-05-28 PROCEDURE — 82550 ASSAY OF CK (CPK): CPT

## 2020-05-28 PROCEDURE — 36415 COLL VENOUS BLD VENIPUNCTURE: CPT

## 2020-05-28 PROCEDURE — 36000 PLACE NEEDLE IN VEIN: CPT

## 2020-05-28 PROCEDURE — 85027 COMPLETE CBC AUTOMATED: CPT

## 2020-05-28 PROCEDURE — 80053 COMPREHEN METABOLIC PANEL: CPT

## 2020-05-28 PROCEDURE — 83615 LACTATE (LD) (LDH) ENZYME: CPT

## 2020-05-28 PROCEDURE — 82728 ASSAY OF FERRITIN: CPT

## 2020-05-28 PROCEDURE — 85379 FIBRIN DEGRADATION QUANT: CPT

## 2020-05-28 PROCEDURE — 99283 EMERGENCY DEPT VISIT LOW MDM: CPT

## 2020-05-28 PROCEDURE — 93005 ELECTROCARDIOGRAM TRACING: CPT

## 2020-05-28 PROCEDURE — 99285 EMERGENCY DEPT VISIT HI MDM: CPT | Mod: 25

## 2020-06-10 ENCOUNTER — APPOINTMENT (OUTPATIENT)
Dept: CT IMAGING | Facility: HOSPITAL | Age: 70
End: 2020-06-10
Payer: MEDICARE

## 2020-06-10 ENCOUNTER — OUTPATIENT (OUTPATIENT)
Dept: OUTPATIENT SERVICES | Facility: HOSPITAL | Age: 70
LOS: 1 days | End: 2020-06-10
Payer: MEDICARE

## 2020-06-10 DIAGNOSIS — Z98.890 OTHER SPECIFIED POSTPROCEDURAL STATES: Chronic | ICD-10-CM

## 2020-06-10 DIAGNOSIS — Z00.8 ENCOUNTER FOR OTHER GENERAL EXAMINATION: ICD-10-CM

## 2020-06-10 PROCEDURE — 71250 CT THORAX DX C-: CPT

## 2020-06-10 PROCEDURE — 71250 CT THORAX DX C-: CPT | Mod: 26

## 2020-06-29 NOTE — ED ADULT NURSE NOTE - NS ED NURSE REPORT GIVEN DT
symbicort is preventive - use regular 2 puffs twice daily,   singulair daily also.      Use xopenex inhaler, albuterol not as effective for you- as rescue therapy, use 2 puff up to every 4-6 as needed. May use nebulizer.    Use prednisone if needed.  20 mg daily for 2-3 should be adequate.  symbicort should decrease prednisone need.    Sleep apnea - up to 5 /hour is within normal range, study may apply cpap if can diagnose sleep apnea 1st part of night.   May need to see to discuss cpap tolerance post study.  
05-Nov-2019 13:10

## 2020-09-29 NOTE — PHYSICAL THERAPY INITIAL EVALUATION ADULT - ASR WT BEARING STATUS EVAL
BLADDER IRRITANTS     There are several changes you can make to your diet to help improve your urinary symptoms. The following have been shown to cause irritation to the bladder and should be AVOIDED if possible:  ~ Coffee (including decaffeinated)   ~ ALL Tea (including green teas and decaffeinated)  ~ Soda/Pop/carbonated beverages/Energy drinks (especially dark dyed joanie, root beers, mountain dew, etc)  ~These are MUCH worse if they have caffeine, but can also be irritative to the bladder even without caffeine  ~ Alcoholic beverages  ~ Spicy foods (peppers contain capsaicin, which is very irritating to the bladder)  ~ Acidic foods (for example: tomato based foods, orange juice, etc)    We encourage increased water intake, unless you have been placed on a fluid restriction by another provider.
Left LE

## 2020-10-16 NOTE — ED ADULT NURSE NOTE - NSFALLRSKUNASSIST_ED_ALL_ED
Dear Poornima Child,    Per your recent telephone screening with Employee Health:    Your lab appointment was scheduled during today's call.    You are to remain off work and isolate yourself from  public places except to seek medical care    Complete the symptom tracker daily on [facility] to comply with your actively monitoring status    YOU MAY NOT RETURN TO WORK WITHOUT CLEARANCE FROM EMPLOYEE HEALTH.  It is not necessary to call Employee Health.  The Central COVID Team will contact you at the appropriate interval for clearance.    Off work start date: 10/16/20        Notify Employee Health if you have a marked increase  in symptoms while you are off.[phone] or email [email or web address].    For [place] Employees, please follow this link to view a copy of the consent form:   [place] Employee Consent Form     Thank you,    Employee Health Central COVID Team    [phone]    Hours: M-F [phone] Saturday - please answer your phone if an outside line is calling, regardless of hours we work 7days a week and will follow up as appropriate.     [email or web address]    Cc: Manager   no

## 2021-01-28 ENCOUNTER — APPOINTMENT (OUTPATIENT)
Dept: NEUROLOGY | Facility: CLINIC | Age: 71
End: 2021-01-28
Payer: MEDICARE

## 2021-01-28 VITALS
BODY MASS INDEX: 21.16 KG/M2 | HEART RATE: 68 BPM | DIASTOLIC BLOOD PRESSURE: 65 MMHG | SYSTOLIC BLOOD PRESSURE: 92 MMHG | WEIGHT: 115 LBS | HEIGHT: 62 IN

## 2021-01-28 VITALS — TEMPERATURE: 97.6 F

## 2021-01-28 PROCEDURE — 99205 OFFICE O/P NEW HI 60 MIN: CPT

## 2021-01-28 RX ORDER — COLD-HOT PACK
50 EACH MISCELLANEOUS DAILY
Qty: 30 | Refills: 0 | Status: ACTIVE | COMMUNITY
Start: 2021-01-28

## 2021-01-28 RX ORDER — COLD-HOT PACK
50 EACH MISCELLANEOUS DAILY
Refills: 0 | Status: ACTIVE | COMMUNITY

## 2021-01-28 RX ORDER — ACETAMINOPHEN/DIPHENHYDRAMINE 500MG-25MG
1000 TABLET ORAL DAILY
Qty: 30 | Refills: 0 | Status: ACTIVE | COMMUNITY
Start: 2021-01-28

## 2021-02-02 NOTE — DATA REVIEWED
[de-identified] : MRI Cervical & Lumbar Spine (11/23/15):\par - Mild cervical spondylosis with minimal spinal cord impingement at C4-C5\par - Lumbar spondylosis at L3 with degenerative changes at L5-S1

## 2021-02-02 NOTE — HISTORY OF PRESENT ILLNESS
[FreeTextEntry1] : This is a 70-year-old left-handed woman who was diagnosed with early-onset Alzheimer's dementia in 2002, and was awarded full disability since then. Last year, she was in an extended care facility following surgical repair of venous ulcer with skin graft. While in the facility, her donepezil was stopped, while her memantine was maintained. Since her discharge in September 2020, her PCP has recommended that the patient see a neurologist to confirm whether it should be resumed.  PCP: Dr. Camron Nicole.

## 2021-02-02 NOTE — ASSESSMENT
[FreeTextEntry1] : 70 RHF with historical and clinical evidence of early-onset Alzheimer's dementia, appropriately managed on two medications to help slow down cognitive decline.

## 2021-02-02 NOTE — PHYSICAL EXAM
[General Appearance - Alert] : alert [General Appearance - In No Acute Distress] : in no acute distress [Oriented To Time, Place, And Person] : oriented to person, place, and time [Impaired Insight] : insight and judgment were intact [Affect] : the affect was normal [Person] : oriented to person [Place] : oriented to place [Time] : oriented to time [Concentration Intact] : normal concentrating ability [Visual Intact] : visual attention was ~T not ~L decreased [Fluency] : fluency intact [Comprehension] : comprehension intact [Cranial Nerves Optic (II)] : visual acuity intact bilaterally,  visual fields full to confrontation, pupils equal round and reactive to light [Cranial Nerves Oculomotor (III)] : extraocular motion intact [Cranial Nerves Trigeminal (V)] : facial sensation intact symmetrically [Cranial Nerves Facial (VII)] : face symmetrical [Cranial Nerves Vestibulocochlear (VIII)] : hearing was intact bilaterally [Cranial Nerves Glossopharyngeal (IX)] : tongue and palate midline [Cranial Nerves Accessory (XI - Cranial And Spinal)] : head turning and shoulder shrug symmetric [Motor Tone] : muscle tone was normal in all four extremities [Cranial Nerves Hypoglossal (XII)] : there was no tongue deviation with protrusion [Motor Strength] : muscle strength was normal in all four extremities [No Muscle Atrophy] : normal bulk in all four extremities [Motor Handedness Right-Handed] : the patient is right hand dominant [Sensation Tactile Decrease] : light touch was intact [Sensation Pain / Temperature Decrease] : pain and temperature was intact [Balance] : balance was intact [Abnormal Walk] : normal gait [2+] : Biceps left 2+ [1+] : Ankle jerk left 1+ [Sclera] : the sclera and conjunctiva were normal [PERRL With Normal Accommodation] : pupils were equal in size, round, reactive to light, with normal accommodation [Extraocular Movements] : extraocular movements were intact [Outer Ear] : the ears and nose were normal in appearance [Oropharynx] : the oropharynx was normal [Neck Appearance] : the appearance of the neck was normal [] : no respiratory distress [Auscultation Breath Sounds / Voice Sounds] : lungs were clear to auscultation bilaterally [Heart Rate And Rhythm] : heart rate was normal and rhythm regular [Heart Sounds] : normal S1 and S2 [Arterial Pulses Carotid] : carotid pulses were normal with no bruits [Full Pulse] : the pedal pulses are present [Abdomen Soft] : soft [Abdomen Tenderness] : non-tender [No CVA Tenderness] : no ~M costovertebral angle tenderness [Nail Clubbing] : no clubbing  or cyanosis of the fingernails [Skin Color & Pigmentation] : normal skin color and pigmentation [Short Term Intact] : short term memory impaired [Paresis Pronator Drift Right-Sided] : no pronator drift on the right [Paresis Pronator Drift Left-Sided] : no pronator drift on the left [Motor Strength Upper Extremities Bilaterally] : strength was normal in both upper extremities [4] : ankle plantar flexion 4/5 [Romberg's Sign] : Romberg's sign was negtive [Past-pointing] : there was no past-pointing [Tremor] : no tremor present [Coordination - Dysmetria Impaired Finger-to-Nose Bilateral] : not present [Coordination - Dysmetria Impaired Heel-to-Shin Bilateral] : not present [Plantar Reflex Right Only] : normal on the right [Plantar Reflex Left Only] : normal on the left [___] : absent on the right [FreeTextEntry4] : Immediate recall: 2/3. 5-minute delayed recall: 1/3 (recalls 1 word with category cue; cannot recall third word with either category and MCQ cue). [FreeTextEntry8] : Slightly wide-based gait. Some difficulty with tiptoe, heel, and tandem gaits, without loss of balance. [FreeTextEntry1] : Mild tenderness to palpation at mid-lower back

## 2021-02-02 NOTE — REASON FOR VISIT
[Initial Eval - Existing Diagnosis] : an initial evaluation of an existing diagnosis [Other: _____] : [unfilled] [FreeTextEntry1] : Alzheimer's disease

## 2021-03-25 NOTE — ED PROVIDER NOTE - CRITICAL CARE ATTESTATION
30.8
I have personally provided the amount of critical care time documented below excluding time spent on separate procedures

## 2021-04-01 ENCOUNTER — OUTPATIENT (OUTPATIENT)
Dept: OUTPATIENT SERVICES | Facility: HOSPITAL | Age: 71
LOS: 1 days | End: 2021-04-01
Payer: MEDICARE

## 2021-04-01 DIAGNOSIS — Z98.890 OTHER SPECIFIED POSTPROCEDURAL STATES: Chronic | ICD-10-CM

## 2021-04-13 DIAGNOSIS — Z71.89 OTHER SPECIFIED COUNSELING: ICD-10-CM

## 2021-06-23 ENCOUNTER — APPOINTMENT (OUTPATIENT)
Dept: NEUROLOGY | Facility: CLINIC | Age: 71
End: 2021-06-23

## 2021-07-01 PROCEDURE — G9005: CPT

## 2021-07-17 NOTE — DIETITIAN INITIAL EVALUATION ADULT. - EST PROTEIN NEEDS6
Consults    History Of Present Illness  Antonella is a 80 year old female presenting with fever and chills after colonoscopy.  She had colonoscopy 7/16/21 with tortutous colon, numerous tics, s/p random biopsy and rectal hot snare polypectomy.  Pt had mild distention after, no pain.  Tolerated breakfast of steak and eggs.  No N V.  She was passing gas.  Several hours after had temp 101 at home with chills.  Went to ER.  Had tachycardia, stable BP.  Elev WBC and lactate.  CT abdomen without perforation or air.  Overnight, no abd pain.  She is passing flatus.  No N V fever.  She is hungry.  ROS otherwise negative.  No SOB CP.    Past Medical History  Past Medical History:   Diagnosis Date   • Breast cancer (CMS/HCC)    • High cholesterol    • Kidney stones         Surgical History  Past Surgical History:   Procedure Laterality Date   • Mastectomy          Social History  Social History     Tobacco Use   • Smoking status: Never Smoker       Family History  No family history on file.     Allergies  ALLERGIES:  Grass, Ondansetron, Ragweed, Tomato   (food or med), Adhesive   (environmental), Bee sting, Food (food or med), Melons   (food), Mold   (environmental), and No known allergies    Medications  Medications Prior to Admission   Medication Sig Dispense Refill   • montelukast (SINGULAIR) 10 MG tablet Take 10 mg by mouth daily.     • Livalo 2 MG Tab Take 2 mg by mouth daily.     • Probiotic Product (ALIGN PO) Take 1 tablet by mouth daily.         Current Facility-Administered Medications   Medication Dose Route Frequency Provider Last Rate Last Admin   • enoxaparin (LOVENOX) injection 40 mg  40 mg Subcutaneous Daily Star Pacheco       • acetaminophen (TYLENOL) tablet 650 mg  650 mg Oral Q6H PRN Kevin Cosby MD       • ondansetron (ZOFRAN ODT) disintegrating tablet 4 mg  4 mg Oral Q6H PRN Kevin Cosby MD           Review of Systems  Review of Systems   Constitutional: Positive for chills, diaphoresis, fatigue and  fever. Negative for activity change, appetite change and unexpected weight change.   HENT: Negative.    Respiratory: Negative.    Cardiovascular: Negative.    Gastrointestinal: Positive for abdominal distention and diarrhea. Negative for abdominal pain, anal bleeding, blood in stool, constipation, nausea, rectal pain and vomiting.   Endocrine: Negative.    Genitourinary: Negative.    Musculoskeletal: Negative.    Neurological: Negative.    Psychiatric/Behavioral: Negative.         Last Recorded Vitals  Blood pressure (!) 155/76, pulse 80, temperature 98.2 °F (36.8 °C), temperature source Oral, resp. rate 16, height 5' 5\" (1.651 m), weight 63.5 kg (139 lb 15.9 oz), SpO2 96 %.    Physical Exam  Physical Exam  Vitals reviewed.   Constitutional:       Appearance: Normal appearance.   HENT:      Head: Normocephalic and atraumatic.      Mouth/Throat:      Mouth: Mucous membranes are moist.      Pharynx: Oropharynx is clear.   Eyes:      Extraocular Movements: Extraocular movements intact.   Cardiovascular:      Rate and Rhythm: Normal rate and regular rhythm.      Pulses: Normal pulses.      Heart sounds: Normal heart sounds.   Pulmonary:      Effort: Pulmonary effort is normal.      Breath sounds: Normal breath sounds.   Abdominal:      General: Bowel sounds are normal. There is distension.      Palpations: Abdomen is soft. There is no mass.      Tenderness: There is no abdominal tenderness. There is no guarding or rebound.   Musculoskeletal:         General: Normal range of motion.      Cervical back: Normal range of motion and neck supple.   Skin:     General: Skin is warm and dry.   Neurological:      General: No focal deficit present.      Mental Status: She is alert and oriented to person, place, and time.   Psychiatric:         Mood and Affect: Mood normal.         Behavior: Behavior normal.            Relevant Results  Labs     WBC (K/mcL)   Date Value   07/17/2021 9.0       RBC (mil/mcL)   Date Value   07/17/2021  3.76 (L)       HCT (%)   Date Value   07/17/2021 37.7       HGB (g/dL)   Date Value   07/17/2021 12.8       PLT (K/mcL)   Date Value   07/17/2021 311     Sodium (mmol/L)   Date Value   07/17/2021 141       Potassium (mmol/L)   Date Value   07/17/2021 3.7       Chloride (mmol/L)   Date Value   07/17/2021 113 (H)       Glucose (mg/dL)   Date Value   07/17/2021 112 (H)     Calcium (mg/dL)   Date Value   07/17/2021 8.6       Carbon Dioxide (mmol/L)   Date Value   07/17/2021 22     BUN (mg/dL)   Date Value   07/17/2021 9       Creatinine (mg/dL)   Date Value   07/17/2021 0.70     No results found for: MALBCR  No results found for: TROP  No results found for: AFET, CEA, [id number]  LAST MICRO:  No results found for: MICRO    Imaging  LAST MRI:  No results found for this or any previous visit.    LAST CT:  === 07/16/21 ===    CT ABDOMEN PELVIS W CONTRAST    - Narrative -  EXAM:  CT ABDOMEN PELVIS W CONTRAST    CLINICAL INDICATION: Abdominal pain. Fever. Recent colonoscopy.    TECHNIQUE:  Helical CT of the abdomen and pelvis was performed after the  intravenous administration of non-ionic intravenous contrast. Adjustment of  the mA and/or kV according to the patient's size was performed.    COMPARISON:  CT abdomen/pelvis without contrast on 08/10/2020.    FINDINGS:    Lower thorax: The heart is normal in size. There is no pericardial or  pleural effusion. There is no lung base consolidation.    Liver: The liver is normal in size.  There is no intrahepatic mass.    Biliary tree: The gallbladder is present.  There is no biliary ductal  dilatation.    Spleen: The spleen is normal in size. There is adjacent accessory splenic  tissue.    Pancreas: The pancreas is normal in size and enhances homogenously.    Adrenal glands: The adrenal glands are normal in size and shape.    Kidneys: There are bilateral symmetric nephrograms. There is a right renal  dystrophic calcification. There are few bilateral tiny cortical foci,  too  small to characterize but statistically benign. There is a small right  renal calyceal stone. There is a small left renal calyceal stone. There is  no ivon hydronephrosis. There are bilateral prominent renal pelves. The  ureters are within normal limits in course and caliber.    Lymph nodes: There is no abdominal or pelvic lymph node enlargement.    Vasculature: There is no abdominal aortic aneurysm.  Atherosclerotic  calcification is seen.    Peritoneum/mesentery/omentum:  There is no free fluid or free air.    GI tract:  There is no bowel obstruction. The appendix is not inflamed.  There is mild to moderate colonic diverticulosis without visualized  evidence of acute diverticulitis. Exam is not optimized for bowel  evaluation.    Pelvic urogenital structures: The urinary bladder is grossly unremarkable.  The uterus is present. There is no adnexal solid mass.    Body wall: There is no acute bone abnormality. There is scoliosis and  spondylosis. There is hip arthritis bilaterally. There is old mild endplate  height loss of the L3 and L4 vertebral bodies. There is atrophy/thinning of  the upper right rectus abdominous muscle. There are anterior surgical  clips.    Key: (S/I) = series number / image number    - Impression -  1.   No acute inflammatory findings.  2.   Overall unchanged mild bilateral nephrolithiasis. No hydronephrosis.    Electronically Signed by: APOLONIA SANDOVAL MD  Signed on: 7/16/2021 7:38 PM      === 08/10/20 ===    CT ABDOMEN PELVIS WO CONTRAST    - Narrative -  EXAM: CT ABDOMEN PELVIS WO CONTRAST    CLINICAL INDICATION: Urinary tract infection.  Concern for kidney stones.    TECHNIQUE:  Helical CT of the abdomen and pelvis was performed without intravenous contrast.  Adjustment of the mA and/or kV according to the patient's size was performed.    COMPARISON: CT abdomen/pelvis without contrast on 06/15/2018.    FINDINGS:    Lower thorax:  The lung bases are overall stable in appearance.  The  heart size is normal.  There is trace pericardial fluid without significant effusion, also present on previous exam.  There is no visualized pleural effusion.    Liver: The liver is normal in size.  No intrahepatic mass is seen on this non-contrast exam.    Biliary tree: The gallbladder is present.  There is no biliary ductal dilatation.    Spleen: The spleen is normal in size.    Pancreas:  The pancreas is normal in size.  There is a stable tiny calcification in the head possibly related to old episode of pancreatitis.  The pancreatic duct is not dilated.    Adrenal glands:  The adrenal glands are normal in size and shape.    Kidneys: There is overall stable mild bilateral nephrolithiasis since 06/15/2018 exam.  There is no visualized new renal stone.  There is no visualized ureteral or bladder stone.  There is no hydronephrosis.    Lymph nodes: There is no abdominal or pelvic lymph node enlargement.    Vasculature: There is no abdominal aortic aneurysm.  Atherosclerotic calcification is seen.    Peritoneum/mesentery/omentum:  There is no free fluid or free air.    GI tract:  There is no bowel obstruction.  The cecum is somewhat atypically located in the central abdomen.  The appendix is normal.  There is mild colonic diverticulosis without visualized evidence of acute diverticulitis.    Pelvic urogenital structures: The urinary bladder is grossly unremarkable aside from probable urachal remnant.  The uterus is present.  There is no visualized adnexal solid mass.    Body wall: There is scoliosis and spondylosis.  There is new/increased mild old-appearing L4 vertebral body superior endplate height loss since 06/15/2018.    Limitation(s): Evaluation of the solid parenchymal organs and vasculature is limited due to lack of intravenous contrast.    Key: (S/I) = series number / image number    - Impression -  1.   Overall stable bilateral nephrolithiasis since 06/15/2018.  2.   No hydronephrosis.    Electronically  Signed by: APOLONIA SANDOVAL MD  Signed on: 8/10/2020 2:04 PM    LAST U/S:  No results found for this or any previous visit.       Assessment & Plan   There is no problem list on file for this patient.    No problems updated.   80 F w fever and distention after colonoscopy.  Normal CT.    Clear liquid diet and advance to general.  Home if she tolerates diet.  I discussed w Bairon Hough and Harjeet.    Code Status    Code Status: Not on file           62.52

## 2021-07-26 ENCOUNTER — RX RENEWAL (OUTPATIENT)
Age: 71
End: 2021-07-26

## 2021-11-09 ENCOUNTER — APPOINTMENT (OUTPATIENT)
Dept: NEUROLOGY | Facility: CLINIC | Age: 71
End: 2021-11-09

## 2021-12-14 ENCOUNTER — OUTPATIENT (OUTPATIENT)
Dept: OUTPATIENT SERVICES | Facility: HOSPITAL | Age: 71
LOS: 1 days | End: 2021-12-14
Payer: MEDICARE

## 2021-12-14 ENCOUNTER — APPOINTMENT (OUTPATIENT)
Dept: ULTRASOUND IMAGING | Facility: HOSPITAL | Age: 71
End: 2021-12-14
Payer: MEDICARE

## 2021-12-14 DIAGNOSIS — B19.20 UNSPECIFIED VIRAL HEPATITIS C WITHOUT HEPATIC COMA: ICD-10-CM

## 2021-12-14 DIAGNOSIS — Z98.890 OTHER SPECIFIED POSTPROCEDURAL STATES: Chronic | ICD-10-CM

## 2021-12-14 PROCEDURE — 76700 US EXAM ABDOM COMPLETE: CPT | Mod: 26

## 2021-12-14 PROCEDURE — 76700 US EXAM ABDOM COMPLETE: CPT

## 2022-01-19 ENCOUNTER — RX RENEWAL (OUTPATIENT)
Age: 72
End: 2022-01-19

## 2022-01-28 NOTE — PATIENT PROFILE ADULT - PRO INTERPRETER NEED 2
[Work for toy] : work for toy [Regards own hand] : regards own hand [Responds to affection] : responds to affection [Social smile] : social smile [Can calm down on own] : can calm down on own [Follow 180 degrees] : follow 180 degrees [Puts hands together] : puts hands together [Grasps object] : grasps object [Imitate speech sounds] : imitate speech sounds [Turns to voices] : turns to voices [Turns to rattling sound] : turns to rattling sound [Squeals] : squeals  [Spontaneous Excessive Babbling] : spontaneous excessive babbling [Pulls to sit - no head lag] : pulls to sit - no head lag [Roll over] : roll over [Chest up - arm support] : chest up - arm support [Bears weight on legs] : bears weight on legs  English

## 2022-03-03 ENCOUNTER — RX RENEWAL (OUTPATIENT)
Age: 72
End: 2022-03-03

## 2022-03-10 NOTE — PATIENT PROFILE ADULT - FUNCTIONAL SCREEN CURRENT LEVEL: SWALLOWING (IF SCORE 2 OR MORE FOR ANY ITEM, CONSULT REHAB SERVICES), MLM)
[General Appearance - Well Developed] : well developed [General Appearance - Well Nourished] : well nourished [Normal Appearance] : normal appearance [Well Groomed] : well groomed [General Appearance - In No Acute Distress] : no acute distress [Skin Color & Pigmentation] : normal skin color and pigmentation [] : no respiratory distress [Respiration, Rhythm And Depth] : normal respiratory rhythm and effort [Exaggerated Use Of Accessory Muscles For Inspiration] : no accessory muscle use [Oriented To Time, Place, And Person] : oriented to person, place, and time [Affect] : the affect was normal [Mood] : the mood was normal [Not Anxious] : not anxious [Normal Station and Gait] : the gait and station were normal for the patient's age 0 = swallows foods/liquids without difficulty

## 2022-03-25 ENCOUNTER — EMERGENCY (EMERGENCY)
Facility: HOSPITAL | Age: 72
LOS: 1 days | Discharge: HOME | End: 2022-03-25
Attending: EMERGENCY MEDICINE | Admitting: EMERGENCY MEDICINE
Payer: MEDICARE

## 2022-03-25 VITALS
DIASTOLIC BLOOD PRESSURE: 83 MMHG | RESPIRATION RATE: 16 BRPM | TEMPERATURE: 98 F | HEART RATE: 72 BPM | WEIGHT: 98.11 LBS | SYSTOLIC BLOOD PRESSURE: 144 MMHG | OXYGEN SATURATION: 96 % | HEIGHT: 64 IN

## 2022-03-25 VITALS
DIASTOLIC BLOOD PRESSURE: 77 MMHG | HEART RATE: 54 BPM | SYSTOLIC BLOOD PRESSURE: 144 MMHG | OXYGEN SATURATION: 98 % | RESPIRATION RATE: 19 BRPM

## 2022-03-25 DIAGNOSIS — Z98.890 OTHER SPECIFIED POSTPROCEDURAL STATES: Chronic | ICD-10-CM

## 2022-03-25 LAB
ALBUMIN SERPL ELPH-MCNC: 3.9 G/DL — SIGNIFICANT CHANGE UP (ref 3.3–5)
ALP SERPL-CCNC: 86 U/L — SIGNIFICANT CHANGE UP (ref 40–120)
ALT FLD-CCNC: 44 U/L — SIGNIFICANT CHANGE UP (ref 10–45)
AMPHET UR-MCNC: NEGATIVE — SIGNIFICANT CHANGE UP
ANION GAP SERPL CALC-SCNC: 9 MMOL/L — SIGNIFICANT CHANGE UP (ref 5–17)
APAP SERPL-MCNC: <1 UG/ML — LOW (ref 10–30)
AST SERPL-CCNC: 40 U/L — SIGNIFICANT CHANGE UP (ref 10–40)
BARBITURATES UR SCN-MCNC: NEGATIVE — SIGNIFICANT CHANGE UP
BASOPHILS # BLD AUTO: 0.05 K/UL — SIGNIFICANT CHANGE UP (ref 0–0.2)
BASOPHILS NFR BLD AUTO: 0.8 % — SIGNIFICANT CHANGE UP (ref 0–2)
BENZODIAZ UR-MCNC: NEGATIVE — SIGNIFICANT CHANGE UP
BILIRUB SERPL-MCNC: 0.5 MG/DL — SIGNIFICANT CHANGE UP (ref 0.2–1.2)
BUN SERPL-MCNC: 16 MG/DL — SIGNIFICANT CHANGE UP (ref 7–23)
CALCIUM SERPL-MCNC: 9.3 MG/DL — SIGNIFICANT CHANGE UP (ref 8.4–10.5)
CHLORIDE SERPL-SCNC: 100 MMOL/L — SIGNIFICANT CHANGE UP (ref 96–108)
CO2 SERPL-SCNC: 29 MMOL/L — SIGNIFICANT CHANGE UP (ref 22–31)
COCAINE METAB.OTHER UR-MCNC: NEGATIVE — SIGNIFICANT CHANGE UP
CREAT SERPL-MCNC: 0.86 MG/DL — SIGNIFICANT CHANGE UP (ref 0.5–1.3)
EGFR: 72 ML/MIN/1.73M2 — SIGNIFICANT CHANGE UP
EOSINOPHIL # BLD AUTO: 0.03 K/UL — SIGNIFICANT CHANGE UP (ref 0–0.5)
EOSINOPHIL NFR BLD AUTO: 0.5 % — SIGNIFICANT CHANGE UP (ref 0–6)
ETHANOL SERPL-MCNC: <3 MG/DL — SIGNIFICANT CHANGE UP (ref 0–3)
GLUCOSE SERPL-MCNC: 107 MG/DL — HIGH (ref 70–99)
HCT VFR BLD CALC: 47.7 % — HIGH (ref 34.5–45)
HGB BLD-MCNC: 16.4 G/DL — HIGH (ref 11.5–15.5)
IMM GRANULOCYTES NFR BLD AUTO: 0.6 % — SIGNIFICANT CHANGE UP (ref 0–1.5)
LYMPHOCYTES # BLD AUTO: 2.74 K/UL — SIGNIFICANT CHANGE UP (ref 1–3.3)
LYMPHOCYTES # BLD AUTO: 44.4 % — HIGH (ref 13–44)
MCHC RBC-ENTMCNC: 30.9 PG — SIGNIFICANT CHANGE UP (ref 27–34)
MCHC RBC-ENTMCNC: 34.4 GM/DL — SIGNIFICANT CHANGE UP (ref 32–36)
MCV RBC AUTO: 89.8 FL — SIGNIFICANT CHANGE UP (ref 80–100)
METHADONE UR-MCNC: NEGATIVE — SIGNIFICANT CHANGE UP
MONOCYTES # BLD AUTO: 0.4 K/UL — SIGNIFICANT CHANGE UP (ref 0–0.9)
MONOCYTES NFR BLD AUTO: 6.5 % — SIGNIFICANT CHANGE UP (ref 2–14)
NEUTROPHILS # BLD AUTO: 2.91 K/UL — SIGNIFICANT CHANGE UP (ref 1.8–7.4)
NEUTROPHILS NFR BLD AUTO: 47.2 % — SIGNIFICANT CHANGE UP (ref 43–77)
NRBC # BLD: 0 /100 WBCS — SIGNIFICANT CHANGE UP (ref 0–0)
OPIATES UR-MCNC: POSITIVE
PCP SPEC-MCNC: SIGNIFICANT CHANGE UP
PCP UR-MCNC: NEGATIVE — SIGNIFICANT CHANGE UP
PLATELET # BLD AUTO: 168 K/UL — SIGNIFICANT CHANGE UP (ref 150–400)
POTASSIUM SERPL-MCNC: 3.5 MMOL/L — SIGNIFICANT CHANGE UP (ref 3.5–5.3)
POTASSIUM SERPL-SCNC: 3.5 MMOL/L — SIGNIFICANT CHANGE UP (ref 3.5–5.3)
PROT SERPL-MCNC: 8.1 G/DL — SIGNIFICANT CHANGE UP (ref 6–8.3)
RBC # BLD: 5.31 M/UL — HIGH (ref 3.8–5.2)
RBC # FLD: 12.8 % — SIGNIFICANT CHANGE UP (ref 10.3–14.5)
SALICYLATES SERPL-MCNC: 2.3 MG/DL — LOW (ref 3–30)
SARS-COV-2 RNA SPEC QL NAA+PROBE: SIGNIFICANT CHANGE UP
SODIUM SERPL-SCNC: 138 MMOL/L — SIGNIFICANT CHANGE UP (ref 135–145)
THC UR QL: POSITIVE
WBC # BLD: 6.17 K/UL — SIGNIFICANT CHANGE UP (ref 3.8–10.5)
WBC # FLD AUTO: 6.17 K/UL — SIGNIFICANT CHANGE UP (ref 3.8–10.5)

## 2022-03-25 PROCEDURE — 85025 COMPLETE CBC W/AUTO DIFF WBC: CPT

## 2022-03-25 PROCEDURE — 80053 COMPREHEN METABOLIC PANEL: CPT

## 2022-03-25 PROCEDURE — 93005 ELECTROCARDIOGRAM TRACING: CPT

## 2022-03-25 PROCEDURE — 80307 DRUG TEST PRSMV CHEM ANLYZR: CPT

## 2022-03-25 PROCEDURE — 99283 EMERGENCY DEPT VISIT LOW MDM: CPT | Mod: 25

## 2022-03-25 PROCEDURE — 93010 ELECTROCARDIOGRAM REPORT: CPT

## 2022-03-25 PROCEDURE — 36415 COLL VENOUS BLD VENIPUNCTURE: CPT

## 2022-03-25 PROCEDURE — 99284 EMERGENCY DEPT VISIT MOD MDM: CPT | Mod: FS

## 2022-03-25 PROCEDURE — 87635 SARS-COV-2 COVID-19 AMP PRB: CPT

## 2022-03-25 PROCEDURE — 96360 HYDRATION IV INFUSION INIT: CPT

## 2022-03-25 RX ORDER — MORPHINE SULFATE 50 MG/1
60 CAPSULE, EXTENDED RELEASE ORAL ONCE
Refills: 0 | Status: DISCONTINUED | OUTPATIENT
Start: 2022-03-25 | End: 2022-03-25

## 2022-03-25 RX ORDER — SODIUM CHLORIDE 9 MG/ML
1000 INJECTION INTRAMUSCULAR; INTRAVENOUS; SUBCUTANEOUS ONCE
Refills: 0 | Status: COMPLETED | OUTPATIENT
Start: 2022-03-25 | End: 2022-03-25

## 2022-03-25 RX ADMIN — SODIUM CHLORIDE 1000 MILLILITER(S): 9 INJECTION INTRAMUSCULAR; INTRAVENOUS; SUBCUTANEOUS at 10:56

## 2022-03-25 RX ADMIN — MORPHINE SULFATE 60 MILLIGRAM(S): 50 CAPSULE, EXTENDED RELEASE ORAL at 15:50

## 2022-03-25 RX ADMIN — MORPHINE SULFATE 60 MILLIGRAM(S): 50 CAPSULE, EXTENDED RELEASE ORAL at 15:43

## 2022-03-25 RX ADMIN — SODIUM CHLORIDE 1000 MILLILITER(S): 9 INJECTION INTRAMUSCULAR; INTRAVENOUS; SUBCUTANEOUS at 12:09

## 2022-03-25 NOTE — ED PROVIDER NOTE - PROGRESS NOTE DETAILS
spoke with pt at length with options for pain management, pt cooperative and agrees, spoke with sw pt has appointment with pain management tomorrow, will give 1 dose of opiate prior to pt leaving to prevent withdrawl B Sunny MARIN

## 2022-03-25 NOTE — ED PROVIDER NOTE - MDM ORDERS SUBMITTED SELECTION
Labs/EKG/Imaging Studies/Medications Immunohistochemistry (11826 and [id number]) billing is not performed here. Please use the Immunohistochemistry Stain Billing plan to accomplish this.

## 2022-03-25 NOTE — ED ADULT NURSE NOTE - ALCOHOL PRE SCREEN (AUDIT - C)
History     Chief Complaint   Patient presents with     Vomiting     Vomiting and abdominal pain Tuesday, nothing since     The history is provided by the patient and the father. No  was used.     Wander Pérez is a 6 year old female who presents with vomiting. She vomited 3 days ago and no vomiting since. Parents gave tylenol when symptoms were present. Denies fever. Eating and drinking well. Bowel and bladder are working well. No antibiotic use in the past 30 days. Immunizations are UTD.     Parents talked with Dr. Rincon's nurse who directed them to present for evaluation.       Problem List:    Patient Active Problem List    Diagnosis Date Noted     URI with cough and congestion 2018     Priority: Medium     Viral gastroenteritis 2017     Priority: Medium     Non-intractable vomiting with nausea 2017     Priority: Medium     Gastroenteritis 2017     Priority: Medium     Dehydration 2017     Priority: Medium     URI (upper respiratory infection) 2016     Priority: Medium     Rash 2015     Priority: Medium     Food allergy 2015     Priority: Medium     Upper respiratory infection with cough and congestion 2015     Priority: Medium     Fever 2015     Priority: Medium     Problem list name updated by automated process. Provider to review       Otitis media 2015     Priority: Medium     Diaper rash 2015     Priority: Medium     Term birth of  female 2011     Priority: Medium        Past Medical History:    History reviewed. No pertinent past medical history.    Past Surgical History:    History reviewed. No pertinent surgical history.    Family History:    Family History   Problem Relation Age of Onset     Thyroid Disease Mother      Asthma Mother      Depression Mother      MENTAL ILLNESS Mother      bipolar     Other - See Comments Father      sarcoidosis     Allergies Father      protein in milk, nuts      Hypertension Maternal Grandfather      DIABETES Maternal Grandfather      HEART DISEASE Maternal Grandfather      HEART DISEASE Paternal Grandmother      Hypertension Paternal Grandmother      MENTAL ILLNESS Paternal Grandmother      anxiety     Depression Paternal Grandmother      Hypertension Paternal Grandfather        Social History:  Marital Status:  Single [1]  Social History   Substance Use Topics     Smoking status: Passive Smoke Exposure - Never Smoker     Smokeless tobacco: Never Used     Alcohol use No        Medications:      dextromethorphan (DELSYM) 30 MG/5ML liquid   Multiple Vitamins-Minerals (MULTI-VITAMIN GUMMIES) CHEW   acetaminophen (TYLENOL) 32 mg/mL solution   ibuprofen (CHILDRENS MOTRIN) 100 MG/5ML suspension   DiphenhydrAMINE HCl (BENADRYL PO)   cetirizine (ZYRTEC) 5 MG/5ML syrup         Review of Systems   Constitutional: Negative for activity change, appetite change and fever.   HENT: Negative for congestion, ear discharge, ear pain, facial swelling, rhinorrhea, sore throat and trouble swallowing.    Respiratory: Negative for cough, wheezing and stridor.    Gastrointestinal: Positive for vomiting. Negative for abdominal pain, diarrhea and nausea.        Vomiting 3 days ago-resolved.    Genitourinary: Negative for dysuria.   Skin: Negative for rash.   Neurological: Negative for weakness and headaches.   Psychiatric/Behavioral: Negative.        Physical Exam   Pulse: 113  Temp: 98.3  F (36.8  C)  Resp: 24  Weight: 24.4 kg (53 lb 12.8 oz)  SpO2: 99 %      Physical Exam   Constitutional: She appears well-developed and well-nourished. She is active. No distress.   Playful and smiling in exam room.    HENT:   Right Ear: Tympanic membrane normal.   Nose: No nasal discharge.   Mouth/Throat: Mucous membranes are moist. No tonsillar exudate. Oropharynx is clear. Pharynx is normal.   Neck: Normal range of motion. Neck supple. No adenopathy.   Cardiovascular: Normal rate, regular rhythm, S1 normal and  S2 normal.    No murmur heard.  Pulmonary/Chest: Effort normal. No nasal flaring or stridor. No respiratory distress. She has no wheezes. She has no rhonchi. She has no rales. She exhibits no retraction.   Abdominal: Soft. She exhibits no distension. There is no tenderness. There is no rebound and no guarding.   Musculoskeletal: Normal range of motion.   Neurological: She is alert.   Skin: Skin is warm and dry. Capillary refill takes less than 3 seconds. No rash noted. She is not diaphoretic.   Nursing note and vitals reviewed.      ED Course     ED Course     Procedures    Results for orders placed or performed during the hospital encounter of 02/16/18   CBC with platelets differential   Result Value Ref Range    WBC 10.1 5.5 - 15.5 10e9/L    RBC Count 4.74 3.7 - 5.3 10e12/L    Hemoglobin 13.0 10.5 - 14.0 g/dL    Hematocrit 37.2 31.5 - 43.0 %    MCV 79 70 - 100 fl    MCH 27.4 26.5 - 33.0 pg    MCHC 34.9 31.5 - 36.5 g/dL    RDW 12.2 10.0 - 15.0 %    Platelet Count 538 (H) 150 - 450 10e9/L    Diff Method Automated Method     % Neutrophils 43.6 %    % Lymphocytes 47.1 %    % Monocytes 8.6 %    % Eosinophils 0.0 %    % Basophils 0.5 %    % Immature Granulocytes 0.2 %    Nucleated RBCs 0 0 /100    Absolute Neutrophil 4.4 0.8 - 7.7 10e9/L    Absolute Lymphocytes 4.8 2.3 - 13.3 10e9/L    Absolute Monocytes 0.9 0.0 - 1.1 10e9/L    Absolute Eosinophils 0.0 0.0 - 0.7 10e9/L    Absolute Basophils 0.1 0.0 - 0.2 10e9/L    Abs Immature Granulocytes 0.0 0 - 0.8 10e9/L    Absolute Nucleated RBC 0.0    Basic metabolic panel   Result Value Ref Range    Sodium 138 133 - 143 mmol/L    Potassium 3.9 3.4 - 5.3 mmol/L    Chloride 104 96 - 110 mmol/L    Carbon Dioxide 25 20 - 32 mmol/L    Anion Gap 9 3 - 14 mmol/L    Glucose 92 70 - 99 mg/dL    Urea Nitrogen 10 9 - 22 mg/dL    Creatinine 0.33 0.15 - 0.53 mg/dL    GFR Estimate GFR not calculated, patient <16 years old. mL/min/1.7m2    GFR Estimate If Black GFR not calculated, patient <16  years old. mL/min/1.7m2    Calcium 9.3 9.1 - 10.3 mg/dL       Assessments & Plan (with Medical Decision Making)     Discussed plan of care. Father verbalized understanding. All questions answered.     I have reviewed the nursing notes.    I have reviewed the findings, diagnosis, plan and need for follow up with the patient.  Discharged in stable condition.     New Prescriptions    No medications on file       Final diagnoses:   Viral syndrome     Increase fluid intake.   Give tylenol and/or ibuprofen for fever or discomfort. Follow dosing on package.   Follow up with PCP with any increase in symptoms or concerns.   Return to urgent care or emergency department with any increase in symptoms or concerns.     MACKENZIE Chambers  2/16/2018  5:04 PM  [facility]       Yolanda Reed NP  02/18/18 [id number]     Statement Selected

## 2022-03-25 NOTE — ED ADULT NURSE NOTE - NSIMPLEMENTINTERV_GEN_ALL_ED
Implemented All Fall with Harm Risk Interventions:  [place] to call system. Call bell, personal items and telephone within reach. Instruct patient to call for assistance. Room bathroom lighting operational. Non-slip footwear when patient is off stretcher. Physically safe environment: no spills, clutter or unnecessary equipment. Stretcher in lowest position, wheels locked, appropriate side rails in place. Provide visual cue, wrist band, yellow gown, etc. Monitor gait and stability. Monitor for mental status changes and reorient to person, place, and time. Review medications for side effects contributing to fall risk. Reinforce activity limits and safety measures with patient and family. Provide visual clues: red socks.

## 2022-03-25 NOTE — ED PROVIDER NOTE - CLINICAL SUMMARY MEDICAL DECISION MAKING FREE TEXT BOX
73 yo female, hx COPD on [place] oxygen, alzheimer, htn p/w morphine and xanax "withdrawal" pt hasn't had xanax in 1 week and took her last morphine yesterday. normally takes 120mg of morphine PO daily. sees Dr. santa [phone]. also sees the [facility]  +anxiety, tremulous 71 yo female, hx COPD on [place] oxygen, alzheimer, htn p/w morphine and xanax "withdrawal" pt hasn't had xanax in 1 week and took her last morphine yesterday. normally takes 120mg of morphine PO daily. sees Dr. santa [phone]. also sees the [facility]  +anxiety, tremulous, mild headache  denies abd pain, n/v/d, fever, hallucinations, seizures  sw arranged for pt to fu with her pain management clinic tomorrow to start tapering morphine  pt also given the option for an inpt detox and pt doesn't want to go.

## 2022-03-25 NOTE — ED PROVIDER NOTE - NS ED ATTENDING STATEMENT MOD
This was a shared visit with the JAVY. I reviewed and verified the documentation and independently performed the documented:

## 2022-03-25 NOTE — ED PROVIDER NOTE - NSFOLLOWUPINSTRUCTIONS_ED_ALL_ED_FT
FOLLOW UP TOMORROW WITH YOUR SCHEDULED APPOINTMENT FOR PAIN MANAGEMENT  Any worsening of symptoms or new concerning symptoms return to the ED     Opioid Withdrawal      Opioids are powerful substances that relieve pain. Opioids include illegal drugs, such as heroin, as well as prescription pain medicines, such as codeine, morphine, hydrocodone, oxycodone, and fentanyl. Opioid withdrawal can happen if you have been taking opioids for a period of time and suddenly stop.    Opioid withdrawal can be mild to severe, and it may include a variety of different symptoms. It is not usually life-threatening.      What are the causes?    This condition is caused by taking opioids for a prolonged amount of time — usually over several weeks, but sometimes for as few as 5 days of regular and uninterrupted use — and then by doing any of the following:  •Stopping use completely.      •Rapidly reducing use, either by reducing the dose or the frequency of use.      •Taking a medicine to block the effect of the opioid (opioid antagonist).        What increases the risk?    The following factors may make you more likely to develop this condition:  •Taking opioids incorrectly.      •Taking opioids for a long period of time.      •Having a history of opioid, alcohol, or illicit drug use and withdrawal from those substances.        What are the signs or symptoms?    Symptoms of this condition can be physical or mental. Physical symptoms include:  •Nausea and vomiting.      •Muscle aches or spasms.      •Watery eyes and runny nose.      •Widening of the dark centers of the eyes (dilated pupils).      •Hair standing on end or goose bumps on the arms and legs.      •Chills or sweating.      •Frequent yawning.      •Twitching or shaking that you cannot control (tremors).      •Flushing and itching of the skin.      •Stomach cramps and diarrhea.      •Increased blood pressure and fast pulse.      Mental symptoms include:  •Depression.      •Anxiety.      •Restlessness and irritability.      •Trouble sleeping.      •Increased drug craving      When symptoms start and how long they last depend on the kind of opioid you have been taking.  •Short-acting opioids, such as heroin and oxycodone, work fast and then lose their effect quickly. Symptoms occur within hours of stopping or reducing the amount you take. The worst symptoms (peak withdrawal) occur in 24–48 hours. Symptoms should diminish over the next 3 to 5 days.      •Long-acting opioids, such as methadone, work for a longer period of time. Symptoms can occur within 30 hours of stopping or reducing the amount you take, and they usually resolve over the next 10 days or so.      There are also medicines that block the effects of opioids (opioid antagonists), such as naltrexone or naloxone, and partial agonists such as buprenorphine. If you take one of these medicines, symptoms begin within minutes.      How is this diagnosed?    This condition is diagnosed based on:  •Your symptoms.    •Your medical history, including:  •Your history of drug and alcohol use.      •Which medicines you have been taking, including dosages and how long and often you have been taking them.        •A physical exam.      Based on your symptoms, you may have testing, such as an ECG to look at the rhythm of your heart or blood tests to check for problems.    Your health care provider may ask that you see a mental health professional or addiction specialist.      How is this treated?     Treatment for this condition is usually provided by mental health professionals with training in substance use disorders (addiction specialists). Treatment may involve:  •Counseling. This treatment is also called talk therapy. It is provided by substance use treatment counselors.      •Support groups. Support groups are run by people who have quit using opioids. They provide emotional support, advice, and guidance.    •Medicines. The medicines used may depend on the severity of your withdrawal. Options may include:  •Medicines to help reduce certain withdrawal symptoms, such as medicines for nausea, vomiting, diarrhea, or stomach cramps.      •An opioid or opioid-like medicine, such as methadone or buprenorphine, to replace the opioid that you have been taking. You may be asked to take less and less of this medicine over time to reduce or prevent withdrawal symptoms.      •Other medicines that may decrease the effects of withdrawal.          Follow these instructions at home:    •Take over-the-counter and prescription medicines only as told by your health care provider.      •Always check with your health care provider before starting any new medicines.      •Keep all follow-up visits as told by your health care provider. This includes all mental health and counseling visits. This is very important.        Contact a health care provider if:    •You are not able to take your medicines as told.      •Your symptoms get worse.      •You take an opioid after stopping use, or you take more of an opioid than you have been.      •You have questions about how to take your medicines or you are unsure of when to take them.        Get help right away if:    •You have a seizure.      •You lose consciousness.      •You cannot stop vomiting.      •You are unable to drink or eat, and you become weak and dizzy.      •You develop chest pain, shortness of breath, or fever.      •You have serious thoughts about hurting yourself or others.      If you ever feel like you may hurt yourself or others, or have thoughts about taking your own life, get help right away. Go to your nearest emergency department or:   • Call your local emergency services (911 in the [place]       • Call a suicide crisis helpline, such as the National Suicide Prevention Lifeline at 1-674.300.3829. This is open 24 hours a day in the [place]       • Text the Crisis Text Line at 877912 (in the [place]         Summary    •Opioid withdrawal is a group of symptoms that can occur if you have been taking opioids for a period of time and suddenly stop.      •Symptoms range from mild to severe, but opioid withdrawal is usually not life-threatening.       •Common symptoms include anxiety, tremors, chills, body aches, nausea, vomiting, and diarrhea. Symptoms may last 5–10 days or so depending on the type of opioids that were involved.      •Treatment may include counseling, support groups, and medicines.      This information is not intended to replace advice given to you by your health care provider. Make sure you discuss any questions you have with your health care provider.

## 2022-03-25 NOTE — ED ADULT NURSE NOTE - OBJECTIVE STATEMENT
72 yr old female from home for "detox". Pt states "I've been on Morphine for 20 years and now I want to detox". Pt reports she takes Morphine 60mg BID for spinal stenosis.

## 2022-03-25 NOTE — ED PROVIDER NOTE - PATIENT PORTAL LINK FT
You can access the FollowMyHealth Patient Portal offered by [facility] by registering at the following website: [email or web address]. By joining [facility]’s FollowMyHealth portal, you will also be able to view your health information using other applications (apps) compatible with our system.

## 2022-03-25 NOTE — ED PROVIDER NOTE - ATTENDING CONTRIBUTION TO CARE
Dr. Correa: I performed a face to face bedside interview with patient regarding history of present illness, review of symptoms and past medical history. I completed an independent physical exam.  I have discussed patient's plan of care with PA.   I agree with note as stated above, having amended the EMR as needed to reflect my findings.   This includes HISTORY OF PRESENT ILLNESS, HIV, PAST MEDICAL/SURGICAL/FAMILY/SOCIAL HISTORY, ALLERGIES AND HOME MEDICATIONS, REVIEW OF SYSTEMS, PHYSICAL EXAM, and any PROGRESS NOTES during the time I functioned as the attending physician for this patient.  Gen:  Well appearning in NAD  Head:  NC/AT  Resp: No distress   Ext: no deformities  Skin: warm and dry as visualized

## 2022-03-25 NOTE — ED PROVIDER NOTE - OBJECTIVE STATEMENT
71 yo female, hx COPD on [place] oxygen, alzheimer, htn p/w morphine and xanax "withdrawal" pt hasn't had xanax in 1 week and took her last morphine yesterday. normally takes 120mg of morphine PO daily. sees Dr. santa [phone]. also sees the [facility]  +anxiety, tremulous 71 yo female, hx COPD on [place] oxygen, alzheimer, htn p/w morphine and xanax "withdrawal" pt hasn't had xanax in 1 week and took her last morphine yesterday. normally takes 120mg of morphine PO daily. sees Dr. santa [phone]. also sees the [facility]  +anxiety, tremulous, mild headache  denies abd pain, n/v/d, fever, hallucinations, seizures

## 2022-03-25 NOTE — ED ADULT TRIAGE NOTE - CHIEF COMPLAINT QUOTE
patient BIBA requesting detox from morphine. report she takes 60mg BID for spinal stenosis for approx. 20yrs.

## 2022-03-25 NOTE — ED PROVIDER NOTE - DOMESTIC TRAVEL HIGH RISK QUESTION
Detail Level: Zone Continue Regimen: Doxycycline doxipen xyzal Plan: Discussed methotrexate and light treatment Otc Regimen: Dove soaps CeraVe or vanicream moisturizer No

## 2022-03-29 NOTE — CHART NOTE - NSCHARTNOTEFT_GEN_A_CORE
SW placed call to patient to discuss and assist with follow up care.  Patient presented to ED on 3/25 requesting detox from Morphine. Pt did not answer. Pt's phone did not have an option to leave a voicemail. Per previous chart notes, [facility] scheduled a follow-up pain management appointment for 3/26 for Pt to discuss morphine taper with pain management MD.

## 2022-07-11 NOTE — ED ADULT NURSE NOTE - CAS EDN DISCHARGE INTERVENTIONS
Called patient's  Jose to relay negative results, he verbalized understanding and stated had no further questions.     IV discontinued, cath removed intact

## 2022-09-08 NOTE — ED PROVIDER NOTE - PRINCIPAL DIAGNOSIS
Childbirth classes? YES, registered next  Plan on breastfeeding? Yes: has application pending through insurance  Birthcontrol? oral contraceptive  Sex on ultrasound? girl  Circumsion? No  Peds doc? [place]    Good fetal movement.   Blood sugars are going well, adjusting weekly. Sees endocrine next week.  Discussed flu shot, COVID booster, TDaP.   RTC 4 weeks       Wound

## 2022-10-04 ENCOUNTER — RX RENEWAL (OUTPATIENT)
Age: 72
End: 2022-10-04

## 2022-10-12 NOTE — PATIENT PROFILE ADULT - DO YOU FEEL UNSAFE AT HOME, WORK, OR SCHOOL?
RN returning call from Monica regarding an appointment for patient that Dr. Torres agreed to see from Dr. Henderson's office. Patient seen for abnormal uterine bleeding on birth control pills. Per Monica, Dr. Torres agreed to see patient in the next 6-8 weeks. Patient does not have appointment at this time. RN will notify Dr. Torres and PSR's to schedule patient.  
no

## 2023-02-10 ENCOUNTER — EMERGENCY (EMERGENCY)
Facility: HOSPITAL | Age: 73
LOS: 1 days | Discharge: ANOTHER HOSPITAL | End: 2023-02-10
Attending: EMERGENCY MEDICINE | Admitting: EMERGENCY MEDICINE
Payer: MEDICARE

## 2023-02-10 VITALS
DIASTOLIC BLOOD PRESSURE: 85 MMHG | OXYGEN SATURATION: 94 % | SYSTOLIC BLOOD PRESSURE: 125 MMHG | HEART RATE: 83 BPM | RESPIRATION RATE: 20 BRPM

## 2023-02-10 VITALS
RESPIRATION RATE: 16 BRPM | WEIGHT: 106.04 LBS | DIASTOLIC BLOOD PRESSURE: 90 MMHG | TEMPERATURE: 97 F | OXYGEN SATURATION: 99 % | HEIGHT: 60 IN | HEART RATE: 95 BPM | SYSTOLIC BLOOD PRESSURE: 133 MMHG

## 2023-02-10 DIAGNOSIS — Z98.890 OTHER SPECIFIED POSTPROCEDURAL STATES: Chronic | ICD-10-CM

## 2023-02-10 LAB
ALBUMIN SERPL ELPH-MCNC: 2.8 G/DL — LOW (ref 3.3–5)
ALP SERPL-CCNC: 55 U/L — SIGNIFICANT CHANGE UP (ref 40–120)
ALT FLD-CCNC: 22 U/L — SIGNIFICANT CHANGE UP (ref 10–45)
ANION GAP SERPL CALC-SCNC: 8 MMOL/L — SIGNIFICANT CHANGE UP (ref 5–17)
AST SERPL-CCNC: 38 U/L — SIGNIFICANT CHANGE UP (ref 10–40)
BASOPHILS # BLD AUTO: 0.01 K/UL — SIGNIFICANT CHANGE UP (ref 0–0.2)
BASOPHILS NFR BLD AUTO: 0.5 % — SIGNIFICANT CHANGE UP (ref 0–2)
BILIRUB SERPL-MCNC: 0.5 MG/DL — SIGNIFICANT CHANGE UP (ref 0.2–1.2)
BUN SERPL-MCNC: 9 MG/DL — SIGNIFICANT CHANGE UP (ref 7–23)
CALCIUM SERPL-MCNC: 7.7 MG/DL — LOW (ref 8.4–10.5)
CHLORIDE SERPL-SCNC: 105 MMOL/L — SIGNIFICANT CHANGE UP (ref 96–108)
CO2 SERPL-SCNC: 29 MMOL/L — SIGNIFICANT CHANGE UP (ref 22–31)
CREAT SERPL-MCNC: 0.59 MG/DL — SIGNIFICANT CHANGE UP (ref 0.5–1.3)
EGFR: 96 ML/MIN/1.73M2 — SIGNIFICANT CHANGE UP
EOSINOPHIL # BLD AUTO: 0.02 K/UL — SIGNIFICANT CHANGE UP (ref 0–0.5)
EOSINOPHIL NFR BLD AUTO: 1 % — SIGNIFICANT CHANGE UP (ref 0–6)
GLUCOSE SERPL-MCNC: 98 MG/DL — SIGNIFICANT CHANGE UP (ref 70–99)
HCT VFR BLD CALC: 38.2 % — SIGNIFICANT CHANGE UP (ref 34.5–45)
HGB BLD-MCNC: 13.1 G/DL — SIGNIFICANT CHANGE UP (ref 11.5–15.5)
IMM GRANULOCYTES NFR BLD AUTO: 0.5 % — SIGNIFICANT CHANGE UP (ref 0–0.9)
LYMPHOCYTES # BLD AUTO: 0.68 K/UL — LOW (ref 1–3.3)
LYMPHOCYTES # BLD AUTO: 35.4 % — SIGNIFICANT CHANGE UP (ref 13–44)
MCHC RBC-ENTMCNC: 31.1 PG — SIGNIFICANT CHANGE UP (ref 27–34)
MCHC RBC-ENTMCNC: 34.3 GM/DL — SIGNIFICANT CHANGE UP (ref 32–36)
MCV RBC AUTO: 90.7 FL — SIGNIFICANT CHANGE UP (ref 80–100)
MONOCYTES # BLD AUTO: 0.11 K/UL — SIGNIFICANT CHANGE UP (ref 0–0.9)
MONOCYTES NFR BLD AUTO: 5.7 % — SIGNIFICANT CHANGE UP (ref 2–14)
NEUTROPHILS # BLD AUTO: 1.09 K/UL — LOW (ref 1.8–7.4)
NEUTROPHILS NFR BLD AUTO: 56.9 % — SIGNIFICANT CHANGE UP (ref 43–77)
NRBC # BLD: 0 /100 WBCS — SIGNIFICANT CHANGE UP (ref 0–0)
PLATELET # BLD AUTO: 62 K/UL — LOW (ref 150–400)
POTASSIUM SERPL-MCNC: 3.2 MMOL/L — LOW (ref 3.5–5.3)
POTASSIUM SERPL-SCNC: 3.2 MMOL/L — LOW (ref 3.5–5.3)
PROT SERPL-MCNC: 5.9 G/DL — LOW (ref 6–8.3)
RAPID RVP RESULT: DETECTED
RBC # BLD: 4.21 M/UL — SIGNIFICANT CHANGE UP (ref 3.8–5.2)
RBC # FLD: 12.2 % — SIGNIFICANT CHANGE UP (ref 10.3–14.5)
RV+EV RNA SPEC QL NAA+PROBE: DETECTED
SARS-COV-2 RNA SPEC QL NAA+PROBE: SIGNIFICANT CHANGE UP
SODIUM SERPL-SCNC: 142 MMOL/L — SIGNIFICANT CHANGE UP (ref 135–145)
WBC # BLD: 1.92 K/UL — LOW (ref 3.8–10.5)
WBC # FLD AUTO: 1.92 K/UL — LOW (ref 3.8–10.5)

## 2023-02-10 PROCEDURE — 0225U NFCT DS DNA&RNA 21 SARSCOV2: CPT

## 2023-02-10 PROCEDURE — 96365 THER/PROPH/DIAG IV INF INIT: CPT

## 2023-02-10 PROCEDURE — 85025 COMPLETE CBC W/AUTO DIFF WBC: CPT

## 2023-02-10 PROCEDURE — 71045 X-RAY EXAM CHEST 1 VIEW: CPT

## 2023-02-10 PROCEDURE — 99284 EMERGENCY DEPT VISIT MOD MDM: CPT | Mod: 25

## 2023-02-10 PROCEDURE — 99284 EMERGENCY DEPT VISIT MOD MDM: CPT

## 2023-02-10 PROCEDURE — 71045 X-RAY EXAM CHEST 1 VIEW: CPT | Mod: 26

## 2023-02-10 PROCEDURE — 80053 COMPREHEN METABOLIC PANEL: CPT

## 2023-02-10 PROCEDURE — 94640 AIRWAY INHALATION TREATMENT: CPT

## 2023-02-10 PROCEDURE — 36415 COLL VENOUS BLD VENIPUNCTURE: CPT

## 2023-02-10 RX ORDER — LEVOFLOXACIN 5 MG/ML
1 INJECTION, SOLUTION INTRAVENOUS
Qty: 7 | Refills: 0
Start: 2023-02-10 | End: 2023-02-16

## 2023-02-10 RX ORDER — IPRATROPIUM/ALBUTEROL SULFATE 18-103MCG
3 AEROSOL WITH ADAPTER (GRAM) INHALATION ONCE
Refills: 0 | Status: COMPLETED | OUTPATIENT
Start: 2023-02-10 | End: 2023-02-10

## 2023-02-10 RX ORDER — ALBUTEROL 90 UG/1
2 AEROSOL, METERED ORAL
Qty: 17 | Refills: 0
Start: 2023-02-10 | End: 2023-02-16

## 2023-02-10 RX ADMIN — Medication 3 MILLILITER(S): at 18:00

## 2023-02-10 NOTE — ED PROVIDER NOTE - PATIENT PORTAL LINK FT
You can access the FollowMyHealth Patient Portal offered by [facility] by registering at the following website: [email or web address]. By joining [facility]’s FollowMyHealth portal, you will also be able to view your health information using other applications (apps) compatible with our system.

## 2023-02-10 NOTE — ED PROVIDER NOTE - EYES, MLM
Problem: Respiratory Impairment - Respiratory Therapy 253  Intervention: Inhaled gas administration  Description: Done  Note: Intervention Status  Done     10/27/20 1845   Oxygen Therapy   SpO2 99 %   Pulse Ox  Mode Continuous   Oxygen In Use Yes   O2 Device Interface High-flow nasal cannula   FiO2 (%) 21 %   O2 Flow Rate (L/min) 2 L/min   Heated Humidification Yes   Device Temperature (°C) 33 °C   Equipment water level 1/2         Clear bilaterally, pupils equal, round and reactive to light.

## 2023-02-10 NOTE — ED PROVIDER NOTE - CARE PLAN
Principal Discharge DX:	Cough   1 Principal Discharge DX:	Cough  Secondary Diagnosis:	COPD exacerbation

## 2023-02-10 NOTE — ED ADULT NURSE NOTE - OBJECTIVE STATEMENT
Patient presents to ED complaining of cough and congestion. Patient has history of COPD. Patient given duoneb and fluids on route. Patient denies trauma, denies fever. Patient states she lives alone and Zander checks on her everyday.

## 2023-02-10 NOTE — ED PROVIDER NOTE - CLINICAL SUMMARY MEDICAL DECISION MAKING FREE TEXT BOX
73-year-old female past medical history of COPD, hypothyroidism hypertension constipation presents to the ED for shortness of breath worsening over the past few days.  Patient states that she has some cough.  No fever or chills.  No known sick contacts as patient states that she lives in the [facility] and does not leave the house.  Zander, her family, checks in on her daily.  He brings her everything that she needs including groceries.  Patient given DuoNebs and Decadron with positive response. Not on home O2. Longstanding COPD.   After meds in ED, pt feeling improved. WBC low. Platelets low. No petechiae. Pt states she has had low WBC before to no etiology. Looked up labs. Pt has been 2 in the past.   Advised f/u with PCP. Worsening, continued or ANY new concerning symptoms return to the emergency department.   Pending Swab result.   Pt comfortable with d/c and understands reasons for return.

## 2023-02-10 NOTE — ED PROVIDER NOTE - OBJECTIVE STATEMENT
73-year-old female past medical history of COPD, hypothyroidism hypertension constipation presents to the ED for shortness of breath worsening over the past few days.  Patient states that she has some cough.  No fever or chills.  No known sick contacts as patient states that she lives in the [facility] and does not leave the house.  Zander, her family, checks in on her daily.  He brings her everything that she needs including groceries.  Patient given DuoNebs and Decadron with positive response. Not on home O2.

## 2023-02-10 NOTE — ED PROVIDER NOTE - NSFOLLOWUPINSTRUCTIONS_ED_ALL_ED_FT
Cough    Coughing is a reflex that clears your throat and your airways. Coughing helps to heal and protect your lungs. It is normal to cough occasionally, but a cough that happens with other symptoms or lasts a long time may be a sign of a condition that needs treatment. Coughing may be caused by infections, asthma or COPD, smoking, postnasal drip, gastroesophageal reflux, as well as other medical conditions. Take medicines only as instructed by your health care provider. Avoid environments or triggers that causes you to cough at work or at home.    SEEK IMMEDIATE MEDICAL CARE IF YOU HAVE ANY OF THE FOLLOWING SYMPTOMS: coughing up blood, shortness of breath, rapid heart rate, chest pain, unexplained weight loss or night sweats.     Use Albuterol and Take Antibiotics as prescribed. Follow up with your doctor. Worsening, continued or ANY new concerning symptoms return to the emergency department.

## 2023-02-10 NOTE — ED ADULT NURSE NOTE - RECENT EXPOSURE TO
Dear Leelee Muniz,     It is time for your follow-up through the [facility] (normal pressure hydrocephalus) [facility]  Unfortunately, I have been unable to contact your or Hilario Matter to coordinate these appointments  Please call the office  back to discuss further  I can be reached at [phone]  Thank you           JEREMIE Avalos  Coordinator, NPH Magui Tipton MD  Director, ALICIA MORALES [facility]      Electronically signed by:Rose Marie Shaw   Oct  4 2016  3:17PM EST Author
none known

## 2023-02-10 NOTE — ED PROVIDER NOTE - NS ED MD DISPO DISCHARGE CCDA
Last OV: 3/29/2022  Last RX:    Next scheduled apt: 9/29/2022
Patient/Caregiver provided printed discharge information.

## 2023-02-16 NOTE — CHART NOTE - NSCHARTNOTEFT_GEN_A_CORE
SW placed call to patient to discuss and assist with follow up care.  Patient presented to ED on 2/10/23 for cough. Patient declined needing assistance at this time. Patient reports that a [employer] representative will be coming next week to complete physical.  Patient reports she is a patient at [facility] and receives care coordinator.  No other SW needs identified at this time.  Patient encouraged to call ED SW if further assistance is needed.

## 2023-06-10 NOTE — H&P ADULT - NSHPPHYSICALEXAM_GEN_ALL_CORE
Vital Signs Last 24 Hrs  T(F): 97.4 (05 Sep 2019 15:46), Max: 97.7 (05 Sep 2019 13:49)  HR: 75 (05 Sep 2019 15:46) (75 - 80)  BP: 107/66 (05 Sep 2019 15:46) (107/66 - 123/76)  RR: 18 (05 Sep 2019 15:46) (18 - 18)  SpO2: 96% (05 Sep 2019 15:46) (96% - 97%)    Physical Exam:  Constitutional: NAD, awake and alert, well-developed  Neck: Soft and supple, No LAD, No JVD  Respiratory: cta b/l no wheezing no rhonchi  Cardiovascular: +s1/s2 no edema b/l le  Gastrointestinal: soft nt nd bs+  Vascular: 2+ peripheral pulses  Neurological: A/O x 3, no focal deficits  Musculoskeletal: 5/5 strength b/l upper and lower extremities; rle ulcer noted clean painful to touch  : Contraindicated  Breasts: Contraindicated  Rectal: Contraindicated Make your follow-up appointment with your doctor as ordered.   No heavy lifting, driving, or strenuous activity for 6 weeks.  Nothing per vagina such as tampons, intercourse, douches or tub baths for 6 weeks or until you see your doctor.  Call your doctor if you're unable to tolerate food, increase in vaginal bleeding, or have difficulty urinating. Call your doctor if you have pain that is not relieved by your prescribed medications. Notify your doctor with any other concerns. Prescription handed to you for a blood pressure cuff to monitor your blood pressures at home. Call your doctor if your blood pressure is greater than or equal to 160 systolic (top number) of 110 diastolic (bottom number) or if you experience a headache unrelieved by OTC medications, blurred vision, or difficulty breathing or right upper quadrant abdominal pain.

## 2023-08-03 NOTE — ED PROVIDER NOTE - PR
----- Message from Manisha Ayoub sent at 8/2/2023  4:49 PM CDT -----  Regarding: NEW ob  This is patients 1st pregnancy. No previous care patient states. Patient does the sickle cell trait lmp was 4-11 no current medications. Please advise    
Chart reviewed, appears okay for pt to see generalist providers at this time. Pt had pregnancy confirmation appt at [facility] in May. Does not appear labs were done or u/s was performed.    
142

## 2023-08-04 NOTE — PROGRESS NOTE ADULT - PROBLEM SELECTOR PLAN 2
wbc - 1.9 today; possibly idiopathic, pt with no signs of infection, followed by Dr. Gloria as outpatient, plan for outpatient follow up Plastics consult; note appreciated, recommends further imaging (MRI) to r/o osteo, will obtain XR of tib/fib today, and pt. can f/u as outpatient for MRI  -no signs of acute infection, continue wound care with [facility] NEGATIVE

## 2023-08-08 ENCOUNTER — INPATIENT (INPATIENT)
Facility: HOSPITAL | Age: 73
LOS: 0 days | Discharge: HOME | DRG: 640 | End: 2023-08-09
Attending: INTERNAL MEDICINE | Admitting: STUDENT IN AN ORGANIZED HEALTH CARE EDUCATION/TRAINING PROGRAM
Payer: MEDICARE

## 2023-08-08 VITALS
OXYGEN SATURATION: 94 % | DIASTOLIC BLOOD PRESSURE: 73 MMHG | RESPIRATION RATE: 18 BRPM | TEMPERATURE: 98 F | SYSTOLIC BLOOD PRESSURE: 128 MMHG | WEIGHT: 106.04 LBS | HEART RATE: 58 BPM

## 2023-08-08 DIAGNOSIS — Z76.89 PERSONS ENCOUNTERING HEALTH SERVICES IN OTHER SPECIFIED CIRCUMSTANCES: ICD-10-CM

## 2023-08-08 DIAGNOSIS — Z98.890 OTHER SPECIFIED POSTPROCEDURAL STATES: Chronic | ICD-10-CM

## 2023-08-08 LAB
ALBUMIN SERPL ELPH-MCNC: 3.6 G/DL — SIGNIFICANT CHANGE UP (ref 3.3–5)
ALP SERPL-CCNC: 75 U/L — SIGNIFICANT CHANGE UP (ref 40–120)
ALT FLD-CCNC: 38 U/L — SIGNIFICANT CHANGE UP (ref 10–45)
ANION GAP SERPL CALC-SCNC: 4 MMOL/L — LOW (ref 5–17)
AST SERPL-CCNC: 53 U/L — HIGH (ref 10–40)
BASOPHILS # BLD AUTO: 0.02 K/UL — SIGNIFICANT CHANGE UP (ref 0–0.2)
BASOPHILS NFR BLD AUTO: 0.5 % — SIGNIFICANT CHANGE UP (ref 0–2)
BILIRUB SERPL-MCNC: 0.6 MG/DL — SIGNIFICANT CHANGE UP (ref 0.2–1.2)
BUN SERPL-MCNC: 17 MG/DL — SIGNIFICANT CHANGE UP (ref 7–23)
CALCIUM SERPL-MCNC: 9.1 MG/DL — SIGNIFICANT CHANGE UP (ref 8.4–10.5)
CHLORIDE SERPL-SCNC: 101 MMOL/L — SIGNIFICANT CHANGE UP (ref 96–108)
CO2 SERPL-SCNC: 35 MMOL/L — HIGH (ref 22–31)
CREAT SERPL-MCNC: 0.82 MG/DL — SIGNIFICANT CHANGE UP (ref 0.5–1.3)
EGFR: 77 ML/MIN/1.73M2 — SIGNIFICANT CHANGE UP
EOSINOPHIL # BLD AUTO: 0.06 K/UL — SIGNIFICANT CHANGE UP (ref 0–0.5)
EOSINOPHIL NFR BLD AUTO: 1.4 % — SIGNIFICANT CHANGE UP (ref 0–6)
GLUCOSE SERPL-MCNC: 91 MG/DL — SIGNIFICANT CHANGE UP (ref 70–99)
HCT VFR BLD CALC: 42.3 % — SIGNIFICANT CHANGE UP (ref 34.5–45)
HGB BLD-MCNC: 14.2 G/DL — SIGNIFICANT CHANGE UP (ref 11.5–15.5)
IMM GRANULOCYTES NFR BLD AUTO: 0.5 % — SIGNIFICANT CHANGE UP (ref 0–0.9)
LYMPHOCYTES # BLD AUTO: 0.99 K/UL — LOW (ref 1–3.3)
LYMPHOCYTES # BLD AUTO: 23.9 % — SIGNIFICANT CHANGE UP (ref 13–44)
MCHC RBC-ENTMCNC: 30.7 PG — SIGNIFICANT CHANGE UP (ref 27–34)
MCHC RBC-ENTMCNC: 33.6 GM/DL — SIGNIFICANT CHANGE UP (ref 32–36)
MCV RBC AUTO: 91.6 FL — SIGNIFICANT CHANGE UP (ref 80–100)
MONOCYTES # BLD AUTO: 0.39 K/UL — SIGNIFICANT CHANGE UP (ref 0–0.9)
MONOCYTES NFR BLD AUTO: 9.4 % — SIGNIFICANT CHANGE UP (ref 2–14)
NEUTROPHILS # BLD AUTO: 2.67 K/UL — SIGNIFICANT CHANGE UP (ref 1.8–7.4)
NEUTROPHILS NFR BLD AUTO: 64.3 % — SIGNIFICANT CHANGE UP (ref 43–77)
NRBC # BLD: 0 /100 WBCS — SIGNIFICANT CHANGE UP (ref 0–0)
PLATELET # BLD AUTO: 79 K/UL — LOW (ref 150–400)
POTASSIUM SERPL-MCNC: 4 MMOL/L — SIGNIFICANT CHANGE UP (ref 3.5–5.3)
POTASSIUM SERPL-SCNC: 4 MMOL/L — SIGNIFICANT CHANGE UP (ref 3.5–5.3)
PROT SERPL-MCNC: 7.6 G/DL — SIGNIFICANT CHANGE UP (ref 6–8.3)
RBC # BLD: 4.62 M/UL — SIGNIFICANT CHANGE UP (ref 3.8–5.2)
RBC # FLD: 12.7 % — SIGNIFICANT CHANGE UP (ref 10.3–14.5)
SODIUM SERPL-SCNC: 140 MMOL/L — SIGNIFICANT CHANGE UP (ref 135–145)
TSH SERPL-MCNC: 4.73 UIU/ML — HIGH (ref 0.36–3.74)
WBC # BLD: 4.15 K/UL — SIGNIFICANT CHANGE UP (ref 3.8–10.5)
WBC # FLD AUTO: 4.15 K/UL — SIGNIFICANT CHANGE UP (ref 3.8–10.5)

## 2023-08-08 PROCEDURE — 93010 ELECTROCARDIOGRAM REPORT: CPT

## 2023-08-08 PROCEDURE — 99223 1ST HOSP IP/OBS HIGH 75: CPT

## 2023-08-08 PROCEDURE — 99285 EMERGENCY DEPT VISIT HI MDM: CPT

## 2023-08-08 PROCEDURE — 99406 BEHAV CHNG SMOKING 3-10 MIN: CPT

## 2023-08-08 RX ORDER — HYDROCHLOROTHIAZIDE 25 MG
1 TABLET ORAL
Qty: 0 | Refills: 0 | DISCHARGE

## 2023-08-08 RX ORDER — ASPIRIN/CALCIUM CARB/MAGNESIUM 324 MG
1 TABLET ORAL
Refills: 0 | DISCHARGE

## 2023-08-08 RX ORDER — UMECLIDINIUM 62.5 UG/1
1 AEROSOL, POWDER ORAL
Qty: 0 | Refills: 0 | DISCHARGE

## 2023-08-08 RX ORDER — ALPRAZOLAM 0.25 MG
1 TABLET ORAL
Qty: 0 | Refills: 0 | DISCHARGE

## 2023-08-08 RX ORDER — LEVOTHYROXINE SODIUM 125 MCG
1 TABLET ORAL
Qty: 0 | Refills: 0 | DISCHARGE

## 2023-08-08 RX ORDER — ATENOLOL 25 MG/1
25 TABLET ORAL DAILY
Refills: 0 | Status: DISCONTINUED | OUTPATIENT
Start: 2023-08-08 | End: 2023-08-09

## 2023-08-08 RX ORDER — MEMANTINE HYDROCHLORIDE 10 MG/1
1 TABLET ORAL
Qty: 0 | Refills: 0 | DISCHARGE

## 2023-08-08 RX ORDER — NICOTINE POLACRILEX 2 MG
1 GUM BUCCAL DAILY
Refills: 0 | Status: DISCONTINUED | OUTPATIENT
Start: 2023-08-08 | End: 2023-08-08

## 2023-08-08 RX ORDER — LEVOTHYROXINE SODIUM 125 MCG
150 TABLET ORAL DAILY
Refills: 0 | Status: DISCONTINUED | OUTPATIENT
Start: 2023-08-08 | End: 2023-08-09

## 2023-08-08 RX ORDER — MIRTAZAPINE 45 MG/1
1 TABLET, ORALLY DISINTEGRATING ORAL
Qty: 0 | Refills: 0 | DISCHARGE

## 2023-08-08 RX ORDER — ASPIRIN/CALCIUM CARB/MAGNESIUM 324 MG
81 TABLET ORAL DAILY
Refills: 0 | Status: DISCONTINUED | OUTPATIENT
Start: 2023-08-08 | End: 2023-08-09

## 2023-08-08 RX ORDER — NICOTINE POLACRILEX 2 MG
1 GUM BUCCAL DAILY
Refills: 0 | Status: DISCONTINUED | OUTPATIENT
Start: 2023-08-08 | End: 2023-08-09

## 2023-08-08 RX ORDER — MORPHINE SULFATE 50 MG/1
1 CAPSULE, EXTENDED RELEASE ORAL
Qty: 0 | Refills: 0 | DISCHARGE

## 2023-08-08 RX ORDER — LACTOBACILLUS ACIDOPHILUS 100MM CELL
1 CAPSULE ORAL
Qty: 0 | Refills: 0 | DISCHARGE

## 2023-08-08 RX ORDER — CLONAZEPAM 1 MG
1 TABLET ORAL
Refills: 0 | DISCHARGE

## 2023-08-08 RX ORDER — BUPRENORPHINE AND NALOXONE 2; .5 MG/1; MG/1
1 TABLET SUBLINGUAL
Refills: 0 | DISCHARGE

## 2023-08-08 RX ORDER — BUPRENORPHINE AND NALOXONE 2; .5 MG/1; MG/1
1 TABLET SUBLINGUAL
Refills: 0 | Status: DISCONTINUED | OUTPATIENT
Start: 2023-08-08 | End: 2023-08-08

## 2023-08-08 RX ORDER — DONEPEZIL HYDROCHLORIDE 10 MG/1
10 TABLET, FILM COATED ORAL AT BEDTIME
Refills: 0 | Status: DISCONTINUED | OUTPATIENT
Start: 2023-08-08 | End: 2023-08-09

## 2023-08-08 RX ORDER — MEMANTINE HYDROCHLORIDE 10 MG/1
1 TABLET ORAL
Refills: 0 | DISCHARGE

## 2023-08-08 RX ORDER — HYDROCORTISONE 1 %
1 OINTMENT (GRAM) TOPICAL
Qty: 0 | Refills: 0 | DISCHARGE

## 2023-08-08 RX ORDER — DONEPEZIL HYDROCHLORIDE 10 MG/1
1 TABLET, FILM COATED ORAL
Qty: 0 | Refills: 0 | DISCHARGE

## 2023-08-08 RX ORDER — FLUTICASONE PROPIONATE AND SALMETEROL 50; 250 UG/1; UG/1
1 POWDER ORAL; RESPIRATORY (INHALATION)
Qty: 0 | Refills: 0 | DISCHARGE

## 2023-08-08 RX ORDER — ONDANSETRON 8 MG/1
4 TABLET, FILM COATED ORAL EVERY 8 HOURS
Refills: 0 | Status: DISCONTINUED | OUTPATIENT
Start: 2023-08-08 | End: 2023-08-09

## 2023-08-08 RX ORDER — ACETAMINOPHEN 500 MG
650 TABLET ORAL EVERY 6 HOURS
Refills: 0 | Status: DISCONTINUED | OUTPATIENT
Start: 2023-08-08 | End: 2023-08-09

## 2023-08-08 RX ORDER — ASCORBIC ACID 60 MG
1 TABLET,CHEWABLE ORAL
Qty: 0 | Refills: 0 | DISCHARGE

## 2023-08-08 RX ORDER — HYDROXYCHLOROQUINE SULFATE 200 MG
1 TABLET ORAL
Qty: 0 | Refills: 0 | DISCHARGE

## 2023-08-08 RX ORDER — LANOLIN ALCOHOL/MO/W.PET/CERES
3 CREAM (GRAM) TOPICAL AT BEDTIME
Refills: 0 | Status: DISCONTINUED | OUTPATIENT
Start: 2023-08-08 | End: 2023-08-09

## 2023-08-08 RX ORDER — ACETAMINOPHEN 500 MG
2 TABLET ORAL
Qty: 0 | Refills: 0 | DISCHARGE

## 2023-08-08 RX ORDER — ALPRAZOLAM 0.25 MG
1.5 TABLET ORAL
Qty: 0 | Refills: 0 | DISCHARGE

## 2023-08-08 RX ORDER — NICOTINE POLACRILEX 2 MG
1 GUM BUCCAL
Qty: 0 | Refills: 0 | DISCHARGE

## 2023-08-08 RX ORDER — MEMANTINE HYDROCHLORIDE 10 MG/1
10 TABLET ORAL DAILY
Refills: 0 | Status: DISCONTINUED | OUTPATIENT
Start: 2023-08-08 | End: 2023-08-09

## 2023-08-08 RX ORDER — ENOXAPARIN SODIUM 100 MG/ML
40 INJECTION SUBCUTANEOUS EVERY 24 HOURS
Refills: 0 | Status: DISCONTINUED | OUTPATIENT
Start: 2023-08-08 | End: 2023-08-09

## 2023-08-08 RX ORDER — CLONAZEPAM 1 MG
1 TABLET ORAL THREE TIMES A DAY
Refills: 0 | Status: DISCONTINUED | OUTPATIENT
Start: 2023-08-08 | End: 2023-08-09

## 2023-08-08 RX ORDER — BUPRENORPHINE AND NALOXONE 2; .5 MG/1; MG/1
1 TABLET SUBLINGUAL
Refills: 0 | Status: DISCONTINUED | OUTPATIENT
Start: 2023-08-08 | End: 2023-08-09

## 2023-08-08 RX ADMIN — DONEPEZIL HYDROCHLORIDE 10 MILLIGRAM(S): 10 TABLET, FILM COATED ORAL at 21:57

## 2023-08-08 RX ADMIN — Medication 1 MILLIGRAM(S): at 21:56

## 2023-08-08 RX ADMIN — Medication 1 PATCH: at 22:17

## 2023-08-08 RX ADMIN — BUPRENORPHINE AND NALOXONE 1 TABLET(S): 2; .5 TABLET SUBLINGUAL at 18:14

## 2023-08-08 NOTE — CHART NOTE - NSCHARTNOTEFT_GEN_A_CORE
Search Terms: Rima Park, 1950Search Date: 08/08/2023 17:08:30 PM  The Drug Utilization Report below displays all of the controlled substance prescriptions, if any, that your patient has filled in the last twelve months. The information displayed on this report is compiled from pharmacy submissions to the Department, and accurately reflects the information as submitted by the pharmacies.    This report was requested by: Lizzy Valadez | Reference #: [id number]    Practitioner Count: 2  Pharmacy Count: 1  Current Opioid Prescriptions: 1  Current Benzodiazepine Prescriptions: 1  Current Stimulant Prescriptions: 0      Patient Demographic Information (PDI)       PDI	First Name	Last Name	Birth Date	Gender	Street Address	[facility]	Zip Code  A	Rima Park	1950	Female	[address] 	[place]	[address]  B	Rima Park	1950	Female	[address]	[place]	[address]    Prescription Information      PDI Filter:    PDI	Current Rx	Drug Type	Rx Written	Rx Dispensed	Drug	Quantity	Days Supply	Prescriber Name	Prescriber DAVID #	Payment Method	Dispenser  A	N	B	09/02/2022	09/04/2022	clonazepam 1 mg tablet	90	30	Zakiya Koroma NP, [id number]	Medicare	[id number]  A	N	O	08/31/2022	09/01/2022	buprenorphine-naloxone 8-2 mg sl film	60	30	Jolene Hunt NP, [id number]	Medicare	[id number]  B	Y	O	07/04/2023	07/10/2023	buprenorphine-naloxone 8-2 mg sl film	60	30	Jolene Hunt NP	[id number]	Medicare	[id number]  B	Y	B	07/04/2023	07/10/2023	clonazepam 1 mg tablet	90	30	Jolene Hunt NP	[id number]	Medicare	[id number]  B	N	O	06/05/2023	06/10/2023	buprenorphine-naloxone 8-2 mg sl film	60	30	Zakiya Koroma NP, [id number]	Medicare	[id number]  B	N	B	06/05/2023	06/10/2023	clonazepam 1 mg tablet	90	30	DuaneetrZakiya barber NP, [id number]	Medicare	[id number]  B	N	O	05/09/2023	05/11/2023	buprenorphine-naloxone 8-2 mg sl film	60	30	Jolene Hunt NP	[id number]	Medicare	[id number]  B	N	B	05/09/2023	05/11/2023	clonazepam 1 mg tablet	90	30	Jolene Hunt NP	[id number]	Medicare	[id number]  B	N	O	04/11/2023	04/12/2023	buprenorphine-naloxone 8-2 mg sl film	60	30	Jolene Hunt NP	[id number]	Medicare	[id number]  B	N	B	04/11/2023	04/12/2023	clonazepam 1 mg tablet	90	30	Jolene Hunt NP	[id number]	Medicare	[id number]  B	N	O	03/06/2023	03/12/2023	buprenorphine-naloxone 8-2 mg sl film	60	30	Zakiya Koroma NP	[id number]	Medicare	[id number]

## 2023-08-08 NOTE — PHYSICAL THERAPY INITIAL EVALUATION ADULT - ADDITIONAL COMMENTS
pt reporting [facility] 1 flight with a rail to get to apartment, just had a HHA starting 5 days a week for 3 hours, pt reports she was having a great deal of difficulty at home managing her ADLs, she reports using her cane to ambulate but having a lot of trouble, reports no family around, she found someone to occasionally shop for her, reports not having left the apartment in over a year

## 2023-08-08 NOTE — ED PROVIDER NOTE - CLINICAL SUMMARY MEDICAL DECISION MAKING FREE TEXT BOX
73-year-old female multiple medical comorbidities without any chief medical complaints but here for difficulty living in her current socialization which poses her failure to thrive risk will need alternative placement/housing given patient cannot climb stairs and does not have a lot of support at home

## 2023-08-08 NOTE — H&P ADULT - ASSESSMENT
73 year old F PMH chronic pain, COPD, HTN, hypothyroidism coming to ED for unsafe home conditions admitted for FTT and debility    #FTT  #Debility  - admit to medicine  - patient appears well and knows she needs help, started to have aide working this week but will need aide for longer hours in the day  - SW to be consulted to help with home situation  - PT consulted, recommend JUAN MIGUEL  - nutrition consulted    #chronic pain  - istop performed  - continue home subaxone and clonidine     #hypothyroidism  - continue home levothyroxine 150 mcg    #HTN  - continue home HCTZ and atenolol    #mood disorder  - continue home namenda and aricept    #DVT ppx  - lovenox 40mg daily    patient states she has no family to update 73 year old F PMH chronic pain, COPD, HTN, hypothyroidism coming to ED for unsafe home conditions admitted for FTT and debility    #FTT  #Debility  - admit to medicine  - patient appears well and knows she needs help, started to have aide working this week but will need aide for longer hours in the day  - SW to be consulted to help with home situation  - PT consulted, recommend JUAN MIGUEL  - nutrition consulted    #chronic pain  - istop performed  - continue home subaxone and clonidine     #hypothyroidism  - continue home levothyroxine 150 mcg    #HTN  - continue home HCTZ and atenolol    #mood disorder  - continue home namenda and aricept    #active smoker  - tobacco cessation counseling provided 4 minutes  - agreeable to nicotine patch while in hospital     #DVT ppx  - lovenox 40mg daily    patient states she has no family to update

## 2023-08-08 NOTE — ED PROVIDER NOTE - OBJECTIVE STATEMENT
73-year-old female chief complaint of failure to thrive at home history of hypothyroidism COPD hypertension..  Patient states that she has been living in a rented home but is not practical for her given that things are too far away and she has difficulty with making food because if she forgets to unplug the TV to turn her microwave on outlets will blow.  Also there are multiple stairs to get into the apartment which she struggles with.  Patient has a new aide she gets a couple hours a day and aide called the ambulance because she found it to be on proctoscopy for the patient to living in those conditions patient denies any current symptoms or pain  Patient walks at baseline with a walker

## 2023-08-08 NOTE — ED ADULT NURSE NOTE - NS ED NURSE LEVEL OF CONSCIOUSNESS AFFECT
----- Message from Doron Sanchez DO sent at 2/18/2020 12:03 PM CST -----  X-ray of left femur reveals no acute fracture dislocation. No further interventions from Internal Medicine at this time. Calm

## 2023-08-08 NOTE — H&P ADULT - NSHPSOCIALHISTORY_GEN_ALL_CORE
Lives alone at home  ambulates with cane  needs assistance with ADLs  smokes 1PPD   denies EtOH or illicit drug use

## 2023-08-08 NOTE — ED PROVIDER NOTE - NS ED ATTENDING STATEMENT MOD
I have seen and examined this patient and fully participated in the care of this patient as the teaching attending.  The service was shared with the JAVY.  I reviewed and verified the documentation and independently performed the documented:

## 2023-08-08 NOTE — H&P ADULT - NSHPREVIEWOFSYSTEMS_GEN_ALL_CORE
CONSTITUTIONAL: denies fever, chills, admits to fatigue, weakness  HEENT: denies blurred vision, sore throat  CARDIOVASCULAR: denies chest pain, chest pressure, palpitations  RESPIRATORY: denies shortness of breath, sputum production  GASTROINTESTINAL: denies nausea, vomiting, diarrhea, abdominal pain  GENITOURINARY: denies dysuria, discharge  NEUROLOGICAL: denies numbness, headache  MUSCULOSKELETAL: denies new joint pain, muscle aches  HEMATOLOGIC: denies gross bleeding, bruising  LYMPHATICS: denies enlarged lymph nodes, extremity swelling  PSYCHIATRIC: denies recent changes in anxiety, depression  ENDOCRINOLOGIC: denies sweating, cold or heat intolerance

## 2023-08-08 NOTE — ED ADULT NURSE NOTE - SUICIDE SCREENING DEPRESSION
DISCHARGE ISSUE - NON-ADHERENT WITH OUTPATIENT SERVICES DISCHARGE ISSUE - NON-ADHERENT WITH OUTPATIENT SERVICES Negative

## 2023-08-08 NOTE — H&P ADULT - NSHPPHYSICALEXAM_GEN_ALL_CORE
T(C): 36.4 (08-08-23 @ 13:06), Max: 36.4 (08-08-23 @ 13:06)  HR: 58 (08-08-23 @ 13:06) (58 - 58)  BP: 128/73 (08-08-23 @ 13:06) (128/73 - 128/73)  RR: 18 (08-08-23 @ 13:06) (18 - 18)  SpO2: 94% (08-08-23 @ 13:06) (94% - 94%)    GENERAL: patient appears well, no acute distress, appropriate, pleasant, frail  EYES: sclera clear, no exudates  ENMT: oropharynx clear without erythema, no exudates, moist mucous membranes  NECK: supple, soft, no thyromegaly noted  LUNGS: good air entry bilaterally, clear to auscultation, symmetric breath sounds, no wheezing or rhonchi appreciated  HEART: soft S1/S2, regular rate and rhythm, no murmurs noted, no lower extremity edema  GASTROINTESTINAL: abdomen is soft, nontender, nondistended, normoactive bowel sounds, no palpable masses  INTEGUMENT: warm and perfused  MUSCULOSKELETAL: no clubbing or cyanosis, no obvious deformity  NEUROLOGIC: awake, alert, oriented x3, good muscle tone in 4 extremities, no obvious sensory deficits  PSYCHIATRIC: mood is good, affect is congruent, linear and logical thought process

## 2023-08-08 NOTE — PHYSICAL THERAPY INITIAL EVALUATION ADULT - PERTINENT HX OF CURRENT PROBLEM, REHAB EVAL
73-year-old female chief complaint of failure to thrive at home history of hypothyroidism COPD hypertension..  Patient states that she has been living in a rented home but is not practical for her given that things are too far away and she has difficulty with making food because if she forgets to unplug the TV to turn her microwave on outlets will blow.  Also there are multiple stairs to get into the apartment which she struggles with.  Patient has a new aide she gets a couple hours a day and aide called the ambulance because she found it to be on proctoscopy for the patient to living in those conditions patient denies any current symptoms or pain 73-year-old female chief complaint of failure to thrive at home history of hypothyroidism COPD hypertension..  Patient states that she has been living in a rented home but is not practical for her given that things are too far away and she has difficulty with making food because if she forgets to unplug the TV to turn her microwave on outlets will blow.  Also there are multiple stairs to get into the apartment which she struggles with.  Patient has a new aide she gets a couple hours a day and aide called the ambulance because she found it to be unsafe for the patient to be living in those conditions-patient denies any current symptoms or pain

## 2023-08-08 NOTE — ED ADULT NURSE NOTE - OBJECTIVE STATEMENT
Pt BIBA from home was sent to ED by her visiting nurse for decreased PO intake and lethargy. pt reports she hasn't had an appetite for a few days now. pt denies any CP, SOB, N/V/D, fevers. pt has chronic pain and see pain management Dr- takes SL film strips Suboxone 2x/day.

## 2023-08-08 NOTE — H&P ADULT - HISTORY OF PRESENT ILLNESS
73 year old F PMH chronic pain, COPD, HTN, hypothyroidism coming in today after aide called ambulance for patient. Patient lives at home alone, has not left home in about a year, ambulates minimally with cane but gets food delivered to home as well as all her meds. She is sometimes forgetful about things she leaves on (TV, microwave, etc) and there are about 12 stairs to get into rental home which patient states is impossible for her to live in. She has been doing everything on her own for 2 years and today was first day aide was to come over (27 hours per week) and saw her condition and called ambulance. Patient at this time without acute medical concerns but very weak from not walking much and fragile due to limited intake.    In the ED, T 97.5F, HR 58, KELLIE 128/73, RR 18, SpO2 94% on RA. Labs all normal, TSH 4.726.

## 2023-08-08 NOTE — ED ADULT NURSE NOTE - NSFALLHARMRISKINTERV_ED_ALL_ED

## 2023-08-09 ENCOUNTER — TRANSCRIPTION ENCOUNTER (OUTPATIENT)
Age: 73
End: 2023-08-09

## 2023-08-09 VITALS — WEIGHT: 119.93 LBS

## 2023-08-09 LAB
ALBUMIN SERPL ELPH-MCNC: 3 G/DL — LOW (ref 3.3–5)
ALP SERPL-CCNC: 71 U/L — SIGNIFICANT CHANGE UP (ref 40–120)
ALT FLD-CCNC: 33 U/L — SIGNIFICANT CHANGE UP (ref 10–45)
ANION GAP SERPL CALC-SCNC: 7 MMOL/L — SIGNIFICANT CHANGE UP (ref 5–17)
AST SERPL-CCNC: 53 U/L — HIGH (ref 10–40)
BASOPHILS # BLD AUTO: 0.02 K/UL — SIGNIFICANT CHANGE UP (ref 0–0.2)
BASOPHILS NFR BLD AUTO: 0.6 % — SIGNIFICANT CHANGE UP (ref 0–2)
BILIRUB SERPL-MCNC: 0.4 MG/DL — SIGNIFICANT CHANGE UP (ref 0.2–1.2)
BUN SERPL-MCNC: 16 MG/DL — SIGNIFICANT CHANGE UP (ref 7–23)
CALCIUM SERPL-MCNC: 8.8 MG/DL — SIGNIFICANT CHANGE UP (ref 8.4–10.5)
CHLORIDE SERPL-SCNC: 99 MMOL/L — SIGNIFICANT CHANGE UP (ref 96–108)
CO2 SERPL-SCNC: 30 MMOL/L — SIGNIFICANT CHANGE UP (ref 22–31)
CREAT SERPL-MCNC: 0.66 MG/DL — SIGNIFICANT CHANGE UP (ref 0.5–1.3)
EGFR: 94 ML/MIN/1.73M2 — SIGNIFICANT CHANGE UP
EOSINOPHIL # BLD AUTO: 0.08 K/UL — SIGNIFICANT CHANGE UP (ref 0–0.5)
EOSINOPHIL NFR BLD AUTO: 2.5 % — SIGNIFICANT CHANGE UP (ref 0–6)
GLUCOSE SERPL-MCNC: 88 MG/DL — SIGNIFICANT CHANGE UP (ref 70–99)
HCT VFR BLD CALC: 40.5 % — SIGNIFICANT CHANGE UP (ref 34.5–45)
HGB BLD-MCNC: 13.6 G/DL — SIGNIFICANT CHANGE UP (ref 11.5–15.5)
IMM GRANULOCYTES NFR BLD AUTO: 0.3 % — SIGNIFICANT CHANGE UP (ref 0–0.9)
LYMPHOCYTES # BLD AUTO: 1.68 K/UL — SIGNIFICANT CHANGE UP (ref 1–3.3)
LYMPHOCYTES # BLD AUTO: 52.2 % — HIGH (ref 13–44)
MCHC RBC-ENTMCNC: 30.8 PG — SIGNIFICANT CHANGE UP (ref 27–34)
MCHC RBC-ENTMCNC: 33.6 GM/DL — SIGNIFICANT CHANGE UP (ref 32–36)
MCV RBC AUTO: 91.6 FL — SIGNIFICANT CHANGE UP (ref 80–100)
MONOCYTES # BLD AUTO: 0.29 K/UL — SIGNIFICANT CHANGE UP (ref 0–0.9)
MONOCYTES NFR BLD AUTO: 9 % — SIGNIFICANT CHANGE UP (ref 2–14)
NEUTROPHILS # BLD AUTO: 1.14 K/UL — LOW (ref 1.8–7.4)
NEUTROPHILS NFR BLD AUTO: 35.4 % — LOW (ref 43–77)
NRBC # BLD: 0 /100 WBCS — SIGNIFICANT CHANGE UP (ref 0–0)
PLATELET # BLD AUTO: 74 K/UL — LOW (ref 150–400)
POTASSIUM SERPL-MCNC: 3.6 MMOL/L — SIGNIFICANT CHANGE UP (ref 3.5–5.3)
POTASSIUM SERPL-SCNC: 3.6 MMOL/L — SIGNIFICANT CHANGE UP (ref 3.5–5.3)
PROT SERPL-MCNC: 7.2 G/DL — SIGNIFICANT CHANGE UP (ref 6–8.3)
RBC # BLD: 4.42 M/UL — SIGNIFICANT CHANGE UP (ref 3.8–5.2)
RBC # FLD: 12.7 % — SIGNIFICANT CHANGE UP (ref 10.3–14.5)
SODIUM SERPL-SCNC: 136 MMOL/L — SIGNIFICANT CHANGE UP (ref 135–145)
WBC # BLD: 3.22 K/UL — LOW (ref 3.8–10.5)
WBC # FLD AUTO: 3.22 K/UL — LOW (ref 3.8–10.5)

## 2023-08-09 PROCEDURE — 84443 ASSAY THYROID STIM HORMONE: CPT

## 2023-08-09 PROCEDURE — 93005 ELECTROCARDIOGRAM TRACING: CPT

## 2023-08-09 PROCEDURE — 97162 PT EVAL MOD COMPLEX 30 MIN: CPT

## 2023-08-09 PROCEDURE — 99239 HOSP IP/OBS DSCHRG MGMT >30: CPT

## 2023-08-09 PROCEDURE — 97116 GAIT TRAINING THERAPY: CPT

## 2023-08-09 PROCEDURE — 99285 EMERGENCY DEPT VISIT HI MDM: CPT

## 2023-08-09 PROCEDURE — 85025 COMPLETE CBC W/AUTO DIFF WBC: CPT

## 2023-08-09 PROCEDURE — 36415 COLL VENOUS BLD VENIPUNCTURE: CPT

## 2023-08-09 PROCEDURE — 80053 COMPREHEN METABOLIC PANEL: CPT

## 2023-08-09 RX ORDER — ATENOLOL 25 MG/1
1 TABLET ORAL
Qty: 0 | Refills: 0 | DISCHARGE

## 2023-08-09 RX ORDER — NICOTINE POLACRILEX 2 MG
1 GUM BUCCAL
Qty: 2 | Refills: 0
Start: 2023-08-09 | End: 2023-09-07

## 2023-08-09 RX ORDER — NICOTINE POLACRILEX 2 MG
14 GUM BUCCAL
Qty: 30 | Refills: 0
Start: 2023-08-09 | End: 2023-09-07

## 2023-08-09 RX ADMIN — Medication 1 PATCH: at 11:26

## 2023-08-09 RX ADMIN — ENOXAPARIN SODIUM 40 MILLIGRAM(S): 100 INJECTION SUBCUTANEOUS at 11:26

## 2023-08-09 RX ADMIN — Medication 1 MILLIGRAM(S): at 06:22

## 2023-08-09 RX ADMIN — BUPRENORPHINE AND NALOXONE 1 TABLET(S): 2; .5 TABLET SUBLINGUAL at 06:22

## 2023-08-09 RX ADMIN — Medication 81 MILLIGRAM(S): at 11:26

## 2023-08-09 RX ADMIN — Medication 1 MILLIGRAM(S): at 13:15

## 2023-08-09 RX ADMIN — MEMANTINE HYDROCHLORIDE 10 MILLIGRAM(S): 10 TABLET ORAL at 11:26

## 2023-08-09 RX ADMIN — Medication 1 PATCH: at 06:29

## 2023-08-09 RX ADMIN — Medication 150 MICROGRAM(S): at 06:23

## 2023-08-09 NOTE — DISCHARGE NOTE NURSING/CASE MANAGEMENT/SOCIAL WORK - NSDCPEWEB_GEN_ALL_CORE
[facility] for Tobacco Control website --- [email or web address] --- [email or web address]

## 2023-08-09 NOTE — DIETITIAN NUTRITION RISK NOTIFICATION - TREATMENT: THE FOLLOWING DIET HAS BEEN RECOMMENDED
Diet, Regular:   Supplement Feeding Modality:  Oral  Ensure Plus High Protein Cans or Servings Per Day:  1       Frequency:  Two Times a day (08-09-23 @ 11:07) [Pending Verification By Attending]  Diet, Regular (08-08-23 @ 17:18) [Active]

## 2023-08-09 NOTE — DIETITIAN INITIAL EVALUATION ADULT - ADD RECOMMEND
1. Recommend Ensure Plus High Protein 8oz PO BID (Provides 700kcal & 40grams of Protein)   2. Honor patients daily food preferences as feasible with prescribed diet   3. Encourage PO intakes  4. Monitor daily PO intakes, GI tolerance, labs, weights, skin integrity, & BM regularity

## 2023-08-09 NOTE — DISCHARGE NOTE NURSING/CASE MANAGEMENT/SOCIAL WORK - NSDCPEEMAIL_GEN_ALL_CORE
[facility] for Tobacco Control email [email or web address]

## 2023-08-09 NOTE — DISCHARGE NOTE PROVIDER - CARE PROVIDER_API CALL
BALLADIN, CHRISTINA  Phone: [phone]  Fax: [phone]  Established Patient  Scheduled Appointment: 08/14/2023 02:30 PM

## 2023-08-09 NOTE — DISCHARGE NOTE PROVIDER - NSDCCPGOAL_GEN_ALL_CORE_FT
To get better and follow your care plan as instructed. Azithromycin Pregnancy And Lactation Text: This medication is considered safe during pregnancy and is also secreted in breast milk.

## 2023-08-09 NOTE — DISCHARGE NOTE PROVIDER - NSDCMRMEDTOKEN_GEN_ALL_CORE_FT
Aricept 10 mg oral tablet: 1 tab(s) orally once a day (at bedtime)  aspirin 81 mg oral tablet: 1 tab(s) orally once a day  atenolol 25 mg oral tablet: 1 tab(s) orally once a day  clonazePAM 1 mg oral tablet: 1 tab(s) orally 3 times a day  hydroCHLOROthiazide 12.5 mg oral capsule: 1 cap(s) orally once a day  levothyroxine 150 mcg (0.15 mg) oral tablet: 1 tab(s) orally once a day  Namenda 10 mg oral tablet: 1 tab(s) orally once a day  Suboxone 8 mg-2 mg sublingual film: 1 film(s) sublingually 2 times a day   Aricept 10 mg oral tablet: 1 tab(s) orally once a day (at bedtime)  aspirin 81 mg oral tablet: 1 tab(s) orally once a day  clonazePAM 1 mg oral tablet: 1 tab(s) orally 3 times a day  hydroCHLOROthiazide 12.5 mg oral capsule: 1 cap(s) orally once a day  levothyroxine 150 mcg (0.15 mg) oral tablet: 1 tab(s) orally once a day  Namenda 10 mg oral tablet: 1 tab(s) orally once a day  Suboxone 8 mg-2 mg sublingual film: 1 film(s) sublingually 2 times a day

## 2023-08-09 NOTE — DISCHARGE NOTE PROVIDER - NSDCCPCAREPLAN_GEN_ALL_CORE_FT
PRINCIPAL DISCHARGE DIAGNOSIS  Diagnosis: Encounter for social work intervention  Assessment and Plan of Treatment: Follow up with Vic Bhatti  for better housing options.   Your blood pressure and heart rate was noted to be borderline, please hold ATENOLOL until your follow up appointment with your primary care doctor on 8/14. Continue to monitor your blood pressure at home and call your doctor if your blood pressure is consistently more than 150 or if you have any new symptoms.      SECONDARY DISCHARGE DIAGNOSES  Diagnosis: Thrombocytopenia  Assessment and Plan of Treatment: Your platelet count was noted to be a little low similar to 2/2023. Follow up with your primary care provider for referral to hematology for further workup. Monitor of any signs of bleeding.    Diagnosis: Smoker  Assessment and Plan of Treatment: Continue using the nicotine patch for smoking cessation and follow up with your primary care doctor.

## 2023-08-09 NOTE — DIETITIAN INITIAL EVALUATION ADULT - OTHER INFO
Patient with fair appetite at this time. Tolerating a regular diet. Denies any chewing/swallowing difficulties. No edema or pressure injuries noted. Reports last BM to be yesterday 8/8/23. NFPE indicated severe protein calorie malnutrition as evidenced by muscle and subcutaneous fat loss. Patient receptive to receive Ensure Plus High Protein 8oz PO BID (Provides 700kcal & 40grams of Protein) to enhance PO intakes and optimize nutritional status. Food preferences obtained. Encouraged PO intakes and discussed protein and calorie-dense foods. Will honor patients daily food preferences as feasible with prescribed diet.

## 2023-08-09 NOTE — DISCHARGE NOTE PROVIDER - DETAILS OF MALNUTRITION DIAGNOSIS/DIAGNOSES
This patient has been assessed with a concern for Malnutrition and was treated during this hospitalization for the following Nutrition diagnosis/diagnoses:     -  08/09/2023: Severe protein-calorie malnutrition   -  08/09/2023: Underweight (BMI < 19)

## 2023-08-09 NOTE — DIETITIAN INITIAL EVALUATION ADULT - PERTINENT MEDS FT
MEDICATIONS  (STANDING):  aspirin  chewable 81 milliGRAM(s) Oral daily  atenolol  Tablet 25 milliGRAM(s) Oral daily  buprenorphine 8 mG/naloxone 2 mG SL  Tablet 1 Tablet(s) SubLingual <User Schedule>  clonazePAM  Tablet 1 milliGRAM(s) Oral three times a day  donepezil 10 milliGRAM(s) Oral at bedtime  enoxaparin Injectable 40 milliGRAM(s) SubCutaneous every 24 hours  hydrochlorothiazide 12.5 milliGRAM(s) Oral daily  levothyroxine 150 MICROGram(s) Oral daily  memantine 10 milliGRAM(s) Oral daily  nicotine -  14 mG/24Hr(s) Patch 1 Patch Transdermal daily    MEDICATIONS  (PRN):  acetaminophen     Tablet .. 650 milliGRAM(s) Oral every 6 hours PRN Temp greater or equal to 38C (100.4F), Mild Pain (1 - 3)  aluminum hydroxide/magnesium hydroxide/simethicone Suspension 30 milliLiter(s) Oral every 4 hours PRN Dyspepsia  melatonin 3 milliGRAM(s) Oral at bedtime PRN Insomnia  ondansetron Injectable 4 milliGRAM(s) IV Push every 8 hours PRN Nausea and/or Vomiting

## 2023-08-09 NOTE — DISCHARGE NOTE NURSING/CASE MANAGEMENT/SOCIAL WORK - NSDCVIVACCINE_GEN_ALL_CORE_FT
influenza, injectable, quadrivalent, preservative free; 09-Sep-2019 15:10; Ham Alejandra (LARISA); [employer]; [id number] (Exp. Date: 30-Jun-2020); IntraMuscular; Deltoid Right.; 0.5 milliLiter(s); VIS (VIS Published: 15-Aug-2019, VIS Presented: 09-Sep-2019);

## 2023-08-09 NOTE — DIETITIAN INITIAL EVALUATION ADULT - PERTINENT LABORATORY DATA
08-09    136  |  99  |  16  ----------------------------<  88  3.6   |  30  |  0.66    Ca    8.8      09 Aug 2023 05:55    TPro  7.2  /  Alb  3.0<L>  /  TBili  0.4  /  DBili  x   /  AST  53<H>  /  ALT  33  /  AlkPhos  71  08-09

## 2023-08-09 NOTE — DISCHARGE NOTE PROVIDER - HOSPITAL COURSE
73 year old F PMH chronic pain, mood disorder, COPD, HTN, hypothyroidism, who was brought in from home after her aide called ambulance for patient. Patient lives at home alone, has not left home in about a year, ambulates minimally with cane but gets food delivered to home as well as all her meds. She is sometimes forgetful about things she leaves on (TV, microwave, etc) and there are about 12 stairs to get into rental home which patient states is impossible for her to live in. She has been doing everything on her own for 2 years was first day aide was to come over (27 hours per week) and saw her condition and called ambulance. Patient at this time without acute medical concerns but very weak from not walking much and fragile due to limited intake.    Patient was admitted for social work evaluation. 73 year old F PMH chronic pain, mood disorder, COPD, HTN, hypothyroidism, who was brought in from home after her aide called ambulance for patient. Patient lives at home alone, has not left home in about a year, ambulates minimally with cane but gets food delivered to home as well as all her meds. She is sometimes forgetful about things she leaves on (TV, microwave, etc) and there are about 12 stairs to get into rental home which patient states is impossible for her to live in. She has been doing everything on her own for 2 years was first day aide was to come over (27 hours per week) and saw her condition and called ambulance. Patient at this time without acute medical concerns but very weak from not walking much and fragile due to limited intake.    Patient was admitted for social work evaluation (see note). Patient was seen by PT and no skilled needs were recommended.   She was noted to have borderline blood pressure and bradycardia with HR 50's. Atenolol was held with improvement. She will continue to hold atenolol on discharge until follow up with her PMD. She was instructed to call her PMD if her blood pressure is persistently elevated (SBP >150) or if she develops any new symptoms. Her follow up appointment is on 8/14.   Lab was notable for thrombocytopenia with platelet of 74 (Plt 62 in 2/2023). She denies any bleeding. She will follow up with her PMD for referral for hematology as warranted.   She was counseled on smoking cessation and is amenable with trial of nicotine patch.   Ms. Park has been doing well and is medically stable for discharge home with outpatient follow up.

## 2023-08-09 NOTE — DISCHARGE NOTE NURSING/CASE MANAGEMENT/SOCIAL WORK - PATIENT PORTAL LINK FT
You can access the FollowMyHealth Patient Portal offered by [facility] by registering at the following website: [email or web address]. By joining [facility]’s FollowMyHealth portal, you will also be able to view your health information using other applications (apps) compatible with our system.

## 2023-08-09 NOTE — DISCHARGE NOTE PROVIDER - ATTENDING DISCHARGE PHYSICAL EXAMINATION:
Patient seen and examined at bedside this morning.     Physical Exam  GENERAL: Pleasant elderly female in NAD  HEENT: NCAT, moist mucous membrane  CVS: Regular rhythm/rate, no murmur  PULM:CTA b/l, no wheeze/rales  GI: Soft, nontender, non-distended, +BS  MSK: No LE edema  SKIN: Intact  NEURO: AAO x 3, non-focal

## 2023-08-09 NOTE — DISCHARGE NOTE PROVIDER - PROVIDER TOKENS
PROVIDER:[TOKEN:[[id number]],SCHEDULEDAPPT:[08/14/2023],SCHEDULEDAPPTTIME:[02:30 PM],ESTABLISHEDPATIENT:[T]]

## 2023-08-09 NOTE — DISCHARGE NOTE NURSING/CASE MANAGEMENT/SOCIAL WORK - NSDCPEFALRISK_GEN_ALL_CORE
For information on Fall & Injury Prevention, visit: [email or web address] OR  [email or web address] OR  https://www.cdc.gov/steadi/patient.html

## 2023-08-09 NOTE — DIETITIAN INITIAL EVALUATION ADULT - ORAL INTAKE PTA/DIET HISTORY
Patient endorses decreased appetite prior to admission. Receives "moms meals" from a prepared meal delivery service. Patient reports that she does has a microwave and refrigerator but admits to having only 1 working outlet in house which prevents her from cooking meals. Patient not sure of UBW but reports unintentional weight loss within past few months and noticed that her clothes fit different on her. Patient reports wearing dentures but denies any chewing/swallowing difficulties. No known food allergies/intolerances.

## 2023-08-25 NOTE — ED PROVIDER NOTE - CPE EDP SKIN NORM
RN informed MD Pizarro of need for new order/diagnosis code. RN called patient. No answer. DENISE w callback #.   WOUNDS

## 2023-08-29 ENCOUNTER — APPOINTMENT (OUTPATIENT)
Dept: NEUROLOGY | Facility: CLINIC | Age: 73
End: 2023-08-29

## 2023-10-10 NOTE — ED ADULT NURSE NOTE - CAS TRG GENERAL AIRWAY, MLM
Pulses: Normal pulses. Pulmonary:      Effort: Pulmonary effort is normal. No respiratory distress. Abdominal:      General: Abdomen is flat. Bowel sounds are normal. There is no distension. Palpations: Abdomen is soft. Tenderness: There is no abdominal tenderness. There is no guarding. Comments: Abdomen soft nontender, no suprapubic or lower abdominal tenderness. Musculoskeletal:         General: Normal range of motion. Cervical back: Normal range of motion and neck supple. Skin:     General: Skin is warm and dry. Capillary Refill: Capillary refill takes less than 2 seconds. Neurological:      General: No focal deficit present. Mental Status: She is alert and oriented to person, place, and time. Psychiatric:         Mood and Affect: Mood normal.         Behavior: Behavior normal.         Thought Content:  Thought content normal.         Judgment: Judgment normal.           SCREENINGS                  LAB, EKG AND DIAGNOSTIC RESULTS   Labs:  Recent Results (from the past 12 hour(s))   HCG, Quantitative, Pregnancy    Collection Time: 10/10/23 12:12 PM   Result Value Ref Range    hCG Quant 35,999 (H) 0 - 6 mIU/mL   Urinalysis with Reflex to Culture    Collection Time: 10/10/23 12:13 PM    Specimen: Urine   Result Value Ref Range    Color, UA Yellow/Straw      Appearance Turbid (A) Clear      Specific Gravity, UA 1.013 1.003 - 1.030      pH, Urine 7.0 5.0 - 8.0      Protein, UA 30 (A) Negative mg/dL    Glucose, UA Negative Negative mg/dL    Ketones, Urine Negative Negative mg/dL    Bilirubin Urine Negative Negative      Blood, Urine Small (A) Negative      Urobilinogen, Urine 2.0 (H) 0.1 - 1.0 EU/dL    Nitrite, Urine Negative Negative      Leukocyte Esterase, Urine Large (A) Negative      WBC, UA 20-50 0 - 4 /hpf    RBC, UA 5-10 0 - 5 /hpf    Epithelial Cells UA Many (A) Few /lpf    BACTERIA, URINE Negative Negative /hpf    Urine Culture if Indicated Urine Culture Ordered (A)
Patent

## 2023-11-08 ENCOUNTER — APPOINTMENT (OUTPATIENT)
Dept: NEUROLOGY | Facility: CLINIC | Age: 73
End: 2023-11-08

## 2023-11-30 NOTE — PATIENT PROFILE ADULT - FLU SEASON?
Is The Patient Presenting As Previously Scheduled?: Yes Is This A New Presentation, Or A Follow-Up?: Rash Yes...

## 2024-03-25 ENCOUNTER — APPOINTMENT (OUTPATIENT)
Dept: NEUROLOGY | Facility: CLINIC | Age: 74
End: 2024-03-25

## 2024-04-22 NOTE — ED ADULT NURSE NOTE - BEFAST ARM SIDE DRIFT
CT called writer and informed writer that they would require 13 hour protocol for pre-contrast meds prior to CTA TAVR. Writer communicated with heart valve team that CT TAVR will have to be completed outpatient. Per Maddison SHEPPARD, will plan for outpatient follow up and that patient can discharge an hour after his radial band is off per protocol.    No

## 2024-05-01 ENCOUNTER — APPOINTMENT (OUTPATIENT)
Dept: HOME HEALTH SERVICES | Facility: HOME HEALTH | Age: 74
End: 2024-05-01
Payer: MEDICARE

## 2024-05-01 VITALS
HEART RATE: 80 BPM | SYSTOLIC BLOOD PRESSURE: 148 MMHG | DIASTOLIC BLOOD PRESSURE: 90 MMHG | OXYGEN SATURATION: 90 % | HEIGHT: 62 IN | WEIGHT: 97 LBS | BODY MASS INDEX: 17.85 KG/M2 | TEMPERATURE: 96 F

## 2024-05-01 DIAGNOSIS — Z71.89 OTHER SPECIFIED COUNSELING: ICD-10-CM

## 2024-05-01 DIAGNOSIS — B18.2 CHRONIC VIRAL HEPATITIS C: ICD-10-CM

## 2024-05-01 DIAGNOSIS — Z12.39 ENCOUNTER FOR OTHER SCREENING FOR MALIGNANT NEOPLASM OF BREAST: ICD-10-CM

## 2024-05-01 DIAGNOSIS — D70.9 NEUTROPENIA, UNSPECIFIED: ICD-10-CM

## 2024-05-01 DIAGNOSIS — F32.2 MAJOR DEPRESSIVE DISORDER, SINGLE EPISODE, SEVERE W/OUT PSYCHOTIC FEATURES: ICD-10-CM

## 2024-05-01 DIAGNOSIS — E46 UNSPECIFIED PROTEIN-CALORIE MALNUTRITION: ICD-10-CM

## 2024-05-01 DIAGNOSIS — F17.200 NICOTINE DEPENDENCE, UNSPECIFIED, UNCOMPLICATED: ICD-10-CM

## 2024-05-01 DIAGNOSIS — Z12.11 ENCOUNTER FOR SCREENING FOR MALIGNANT NEOPLASM OF COLON: ICD-10-CM

## 2024-05-01 DIAGNOSIS — C85.10 UNSPECIFIED B-CELL LYMPHOMA, UNSPECIFIED SITE: ICD-10-CM

## 2024-05-01 DIAGNOSIS — F03.90 UNSPECIFIED DEMENTIA W/OUT BEHAVIORAL DISTURBANCE: ICD-10-CM

## 2024-05-01 DIAGNOSIS — D69.6 THROMBOCYTOPENIA, UNSPECIFIED: ICD-10-CM

## 2024-05-01 PROCEDURE — 99497 ADVNCD CARE PLAN 30 MIN: CPT

## 2024-05-01 PROCEDURE — 99497 ADVNCD CARE PLAN 30 MIN: CPT | Mod: 25

## 2024-05-01 PROCEDURE — 99345 HOME/RES VST NEW HIGH MDM 75: CPT | Mod: 25

## 2024-05-01 PROCEDURE — G0506: CPT

## 2024-05-01 PROCEDURE — 99407 BEHAV CHNG SMOKING > 10 MIN: CPT

## 2024-05-01 RX ORDER — CLONAZEPAM 1 MG/1
1 TABLET ORAL 3 TIMES DAILY
Refills: 0 | Status: ACTIVE | COMMUNITY
Start: 2024-05-01

## 2024-05-01 RX ORDER — ATENOLOL 25 MG/1
25 TABLET ORAL DAILY
Qty: 30 | Refills: 0 | Status: DISCONTINUED | COMMUNITY
Start: 2021-01-28 | End: 2024-05-01

## 2024-05-01 RX ORDER — MIRTAZAPINE 7.5 MG/1
7.5 TABLET, FILM COATED ORAL
Refills: 0 | Status: DISCONTINUED | COMMUNITY
End: 2024-05-01

## 2024-05-01 RX ORDER — MORPHINE SULFATE 60 MG
60 TABLET, EXTENDED RELEASE ORAL
Refills: 0 | Status: DISCONTINUED | COMMUNITY
End: 2024-05-01

## 2024-05-01 RX ORDER — BUPRENORPHINE AND NALOXONE 8; 2 MG/1; MG/1
8-2 FILM BUCCAL; SUBLINGUAL
Refills: 0 | Status: ACTIVE | COMMUNITY
Start: 2024-05-01

## 2024-05-01 RX ORDER — ALPRAZOLAM 2 MG/1
2 TABLET ORAL EVERY 6 HOURS
Qty: 120 | Refills: 0 | Status: DISCONTINUED | COMMUNITY
Start: 2021-01-28 | End: 2024-05-01

## 2024-05-01 RX ORDER — ALBUTEROL SULFATE 90 UG/1
108 (90 BASE) INHALANT RESPIRATORY (INHALATION)
Qty: 2 | Refills: 4 | Status: ACTIVE | COMMUNITY
Start: 2024-05-01 | End: 1900-01-01

## 2024-05-01 NOTE — PHYSICAL EXAM
[Normal Sclera/Conjunctiva] : normal sclera/conjunctiva [Normal Oropharynx] : the oropharynx was normal [Supple] : the neck was supple [de-identified] : disheveled pale  cachect emotional   and agitated

## 2024-05-01 NOTE — REVIEW OF SYSTEMS
[Fatigue] : fatigue [Recent Change In Weight] : ~T recent weight change [Vision Problems] : vision problems [Shortness Of Breath] : shortness of breath [Wheezing] : wheezing [Cough] : cough [Dyspnea on Exertion] : dyspnea on exertion [Joint Pain] : joint pain [Muscle Weakness] : muscle weakness [Nail Changes] : nail changes [Memory Loss] : memory loss [Unsteady Walking] : ataxia [Insomnia] : insomnia [Anxiety] : anxiety [Depression] : depression [Negative] : Genitourinary [Hearing Loss] : no hearing loss [Constipation] : no constipation

## 2024-05-01 NOTE — COUNSELING
[Underweight - ( BMI  <18.5 )] : underweight - ( BMI  <18.5 ) [TLC diet recommended] : TLC diet recommended [Smoker, not interested in quitting] : smoker, not interested in quitting [Use assistive device to avoid falls] : use assistive device to avoid falls [Remove clutter and unsafe carpeting to avoid falls] : remove clutter and unsafe carpeting to avoid falls [] : foot exam [Patient not on disease-modifying anti-rheumatic drug due to overall prognosis] : Patient not on disease-modifying anti-rheumatic drug due to overall prognosis [Improve pain control] : improve pain control [Decrease stress] : decrease stress [Decrease hospital use] : decrease hospital use [Minimize unnecessary interventions] : minimize unnecessary interventions [Maintain functional ability] : maintain functional ability [Discussed disease trajectory with patient/caregiver] : discussed disease trajectory with patient/caregiver [Likely to achieve goals/desired outcomes] : likely to achieve goals/desired outcomes [Patient/Caregiver has ___ understanding of disease process] : patient/caregiver has [unfilled] understanding of disease process [Full Code] : Code Status: Full Code [No Limitations] : Treatment Guidelines: No limitations [Trial of Bipap] : Intubation: Trial of Bipap [[facility]: _____] [facility]: [unfilled] [_____] : HCP: [unfilled] [ - New patient with 2 or more chronic conditions; CCM discussed and patient-centered care plan established] : New patient with 2 or more chronic conditions; CCM discussed and patient-centered care plan established [96221 - Tobacco Use Cessation Intensive Greater Than 10 Minutes] : Tobacco Use Cessation Intensive Greater Than 10 Minutes

## 2024-05-01 NOTE — CHRONIC CARE ASSESSMENT
[Inadequate social support] : inadequate social support [Patient Non-adherent to care plan] : patient non-adherent to care plan [Patient wanders] : patient wanders [< 30 Minutes/Session] : (< 30 minutes per session) [Strength Training] : strength training [Noted Weight Loss] : noted weight loss [PPS Score: ____] : Palliative Performance Scale (PPS) Score: [unfilled] [FAST Score: ____] : Functional Assessment Scale (FAST) Score: [unfilled]

## 2024-05-01 NOTE — HISTORY OF PRESENT ILLNESS
[Patient] : patient [FreeTextEntry1] : gait  instability  cachexia  [FreeTextEntry2] : patient is seen today for initial visit and enrollment into  a house call  program along with care manager patient is homebound due   most of the history obtained from patient  Past medical history include   hepatitis C  B cell lymphoma  COPD  current smoker  dementia on meds  chronic pain   patient  still follows with   specialists  [psychaistrist DR Joshua pain specialist   last hospitalization   2023  and  prolonged nursing home stay diet regular  appetite   ok dysphagia none Weight decreasing  constipation denies incontinence denies pressure/bed sores denies Ambulates with cane falls none Behavior agitated and   tearful  under care of psychiatry  Mood depressed    memory   bordeline  ?takes meds   documented dementia  sleep   terrible as per patient  sensory deficits   wears glasses  pain always   legs  Medication refills done and reconciliation  done  Goals of care discussed and completed

## 2024-05-03 ENCOUNTER — LABORATORY RESULT (OUTPATIENT)
Age: 74
End: 2024-05-03

## 2024-05-06 ENCOUNTER — NON-APPOINTMENT (OUTPATIENT)
Age: 74
End: 2024-05-06

## 2024-05-06 LAB
25(OH)D3 SERPL-MCNC: 46 NG/ML
ALBUMIN SERPL ELPH-MCNC: 4.5 G/DL
ALP BLD-CCNC: 105 U/L
ALT SERPL-CCNC: 34 U/L
ANION GAP SERPL CALC-SCNC: 13 MMOL/L
AST SERPL-CCNC: 53 U/L
BILIRUB SERPL-MCNC: 0.4 MG/DL
BUN SERPL-MCNC: 10 MG/DL
CALCIUM SERPL-MCNC: 9.4 MG/DL
CHLORIDE SERPL-SCNC: 97 MMOL/L
CHOLEST SERPL-MCNC: 158 MG/DL
CO2 SERPL-SCNC: 30 MMOL/L
CREAT SERPL-MCNC: 0.72 MG/DL
EGFR: 89 ML/MIN/1.73M2
GLUCOSE SERPL-MCNC: 86 MG/DL
HCT VFR BLD CALC: 45.9 %
HDLC SERPL-MCNC: 74 MG/DL
HGB BLD-MCNC: 15 G/DL
LDLC SERPL CALC-MCNC: 61 MG/DL
MCHC RBC-ENTMCNC: 31 PG
MCHC RBC-ENTMCNC: 32.7 GM/DL
MCV RBC AUTO: 94.8 FL
NONHDLC SERPL-MCNC: 84 MG/DL
PLATELET # BLD AUTO: 99 K/UL
POTASSIUM SERPL-SCNC: 3.8 MMOL/L
PROT SERPL-MCNC: 7.7 G/DL
RBC # BLD: 4.84 M/UL
RBC # FLD: 13.3 %
SODIUM SERPL-SCNC: 140 MMOL/L
TRIGL SERPL-MCNC: 138 MG/DL
TSH SERPL-ACNC: 16.5 UIU/ML
VIT B12 SERPL-MCNC: 882 PG/ML
WBC # FLD AUTO: 4 K/UL

## 2024-05-22 ENCOUNTER — APPOINTMENT (OUTPATIENT)
Dept: NEUROLOGY | Facility: CLINIC | Age: 74
End: 2024-05-22

## 2024-07-02 ENCOUNTER — TRANSCRIPTION ENCOUNTER (OUTPATIENT)
Age: 74
End: 2024-07-02

## 2024-07-02 ENCOUNTER — NON-APPOINTMENT (OUTPATIENT)
Age: 74
End: 2024-07-02

## 2024-07-02 ENCOUNTER — APPOINTMENT (OUTPATIENT)
Dept: HOME HEALTH SERVICES | Facility: HOME HEALTH | Age: 74
End: 2024-07-02
Payer: MEDICARE

## 2024-07-02 DIAGNOSIS — J44.89 OTHER SPECIFIED CHRONIC OBSTRUCTIVE PULMONARY DISEASE: ICD-10-CM

## 2024-07-02 PROCEDURE — 99348 HOME/RES VST EST LOW MDM 30: CPT

## 2024-07-02 RX ORDER — PREDNISONE 20 MG/1
20 TABLET ORAL DAILY
Qty: 10 | Refills: 0 | Status: ACTIVE | COMMUNITY
Start: 2024-07-02 | End: 1900-01-01

## 2024-07-02 RX ORDER — AZITHROMYCIN 250 MG/1
250 TABLET, FILM COATED ORAL
Qty: 1 | Refills: 0 | Status: ACTIVE | COMMUNITY
Start: 2024-07-02 | End: 1900-01-01

## 2024-07-26 ENCOUNTER — APPOINTMENT (OUTPATIENT)
Dept: HOME HEALTH SERVICES | Facility: HOME HEALTH | Age: 74
End: 2024-07-26

## 2024-08-27 NOTE — PROGRESS NOTE ADULT - ASSESSMENT
Refill request      Phenytoin 100 mg ER  #270  Si capsules AM and 1 in the PM        [facility] ([facility] in [place])  New pharmacy        Last Appt: 24  Next Appt: 24   Physical Examination:  GENERAL:               Alert, Oriented cachetic appearing  HEENT:                   No JVD, moist MM  PULM:                     diminished breath sounds throughout , no gross wheezing  CVS:                         S1, S2,  No Murmur  ABD:                        Soft, nondistended, nontender, normoactive bowel sounds,   DXT:                         Superficial wound about anterior lateral mid to distal third of RLE with small amount of overlying purulence. Trace edema to RLE. Nontender, No Cyanosis or Clubbing   NEURO:                  Alert, oriented, interactive, nonfocal, follows commands  PSYC:                      Calm, + Insight.    Assessment  1) Severe sepsis due to LLL PNA - Improving  2) Hypoxic respiratory failure / Respiratory distress requiring NIV  Resolved  3) Lung nodules vs Lung mass recently diagnosed - Awaiting out patient PET scan  4) RLE cellulitis/wound s/p Skin graft  5) Possible acute on chronic COPD exacerbation , Ex smoker  6) Underlying Anxiety, hypothyroid,       PLAN:  - RA o2  - ABX as per ID Cefepime, Vanco, Doxy - Consider de escalate of abx - can d/c vanco  - Taper Steroids to oral   - Continue Nebs  - lactic acid resolved  - Dr. Evans wound follow up called for RLE chronic lesion -apply xeroform gauze with protective dressing, change every other day, apply ace bandage when out of bed  - biotine for dry mouth  - once stabilized pt will need PET scan to evaluate lung lesions r/o ca.   palliative care for [facility]/Health care proxy form      PMD:	Dr. Leonard 			                   Notified(Date):11/5/19  Family Updated: 		Marilin kimble - [phone]                                 Date:  11/5/19 pt states she can make medical decisions if unable to do so.      Sedation & Analgesia:	as above  Diet/Nutrition:		as tolerated  GI PPx:			ppi    DVT Ppx:		Hep sq    Activity:		          as tolerated       Head of Bed:               35-45  Glycemic Control:        n/a    Lines:  PIV    Restraints were deemed necessary to prevent removal of life-sustaining devices [  ] YES   [ x   ]  NO    Disposition: continue care on tele, case d/ wDr. Lokesh, will follow for pulmonary     Goals of Care: Full     GOC: Full CODE - patient requests that in the event she is unable to make decisions for herself that, Marilin, her friend present at bedside be appointment he medical decision maker.

## 2024-09-10 NOTE — PATIENT PROFILE ADULT - NSPROPOAPRESSUREINJURY_GEN_A_NUR
no What Type Of Note Output Would You Prefer (Optional)?: Standard Output Hpi Title: Evaluation of Skin Lesions How Severe Are Your Spot(S)?: mild Have Your Spot(S) Been Treated In The Past?: has not been treated

## 2024-09-26 ENCOUNTER — RX RENEWAL (OUTPATIENT)
Age: 74
End: 2024-09-26

## 2024-10-02 NOTE — ED ADULT NURSE NOTE - NS ED NURSE DISCH DISPOSITION
"Ambulatory Visit  Name: Latoya Lewis      : 1958      MRN: [id number]  Encounter Provider: ANALI Harrell  Encounter Date: 10/2/2024   Encounter department: [facility]    Assessment & Plan  Bartholin cyst       Plan  Schedule mammogram after   Call with needs or concerns  Schedule Family Practice appointment to F/U elevated BP  Pt and partner verbalized understanding of all discussed.        Encounter for screening mammogram for malignant neoplasm of breast    Orders:    Mammo screening bilateral w 3d and cad; Future      History of Present Illness     Latoya Lewis is a 66 y.o. female who presents to F/U Ed visit for drained R side Bartholin cyst  Pt has been taking Naproxen as ordered in the Ed  The bartholin cyst was drained 2024 and at 2024 F/U there were no sign of infection  Pt and her partner state she ha memory loss since she was put on Cymbolta after the death of her son a few years ago, she no longer is taking the medication  Pt had a Hx of a FLORIDALMA many years ago, she unsure why but states she never had cancer, denies VB since, unsure when last PAP was done  Last mammogram was 2023 which noted a benign R breast cyst   BP was 176/88, pt ran out of medication, plans to schedule Family Practice appointment to follow up elevated BP today   Colorectal screening is due 2025    Explained bartholin cyst has resolved. Advised to call with with return of tenderness drainage or the cyst  Explained mammogram was ordered.    Depression screen could not be completed due to pt's memmory loss.    Review of Systems  .Pertinent items are note in the HPI          Objective     BP (!) 189/98 (BP Location: Left arm, Patient Position: Sitting, Cuff Size: Standard)   Pulse 56   Ht 5' 3\" (1.6 m)   Wt 80.7 kg (178 lb)   BMI 31.53 kg/m²     Physical Exam  Constitutional:       Appearance: Normal appearance.   Eyes:      General:         Left eye: " No discharge.   Pulmonary:      Effort: Pulmonary effort is normal. No respiratory distress.   Genitourinary:     General: Normal vulva.   Musculoskeletal:         General: Normal range of motion.      Cervical back: Normal range of motion.   Skin:     General: Skin is warm and dry.   Neurological:      Mental Status: She is alert. Mental status is at baseline.   Psychiatric:         Mood and Affect: Mood normal.         Behavior: Behavior normal.     Negative cough or SOB  Vulva negative lesion or erythema.  R Bartholin cyst resolved negative tenderness or erythema in the area, negative drainage  Breast NTm Negative masses plapable (R breast cyst noted 11/17/2023), negative discharge or dimpling     Discharged

## 2024-11-26 ENCOUNTER — NON-APPOINTMENT (OUTPATIENT)
Age: 74
End: 2024-11-26

## 2024-12-09 NOTE — ED ADULT NURSE NOTE - PERIPHERAL VASCULAR
Called patient back to discuss illness/symptoms.  Advised rescheduling.  Patient opted for 2/18/25 at 1:50.  Case message sent.  Dr. Kohler's book updated.  
Patient has colonoscopy with Dr Kohler on 12/10/24 and is wondering if she should cancel, she reports that she has a worsening cough.  Patient states she does not have a fever.  Please call to advise.  
WDL

## 2025-01-20 NOTE — ED PROVIDER NOTE - CHPI ED SYMPTOMS NEG
Patient seen in wound care clinic for routine RN dressing change.  Dressing changed in clinic; patient tolerated.  No complaints of feeling ill, fevers, nausea, or vomiting.  All patient concerns were addressed.  Continue plan of care.    Wound Care:  bilateral ischial PI's: Cleanse with soap and water, pat dry Apply aquacel Ag Cover with dry secondary dressing; Left Lateral foot - iodoflex     Writer has taken care of this patient today during their wound care visit  with the assistance of Tim ORTIZ.       
no bleeding

## 2025-02-10 NOTE — ED PROVIDER NOTE - NS ED ATTENDING STATEMENT MOD
Notified by performing nurse in person       Exercise stress test     ECG: Resting ECG demonstrates sinus bradycardia.    Stress Test: A Jignesh protocol stress test was performed. The patient was stressed for 7 min and 0 sec.     Impression:  This is a normal treadmill stress test.  Patient's baseline EKG reveals sinus bradycardia at 57 bpm otherwise EKG appears to be normal.  Patient exercised for a total of 7 minutes on Jignesh protocol achieving a total workload of 8 METS.  Exercise was terminated once patient achieved better than 90% of age expected heart rate.  Patient was slightly short of breath but no complaints of chest pain noted.  EKG tracings revealed upsloping ST depressions at the J-point but no diagnostic ischemic changes or arrhythmias seen.  Physiological blood pressure response to exercise noted.  King treadmill score is 7 suggesting low risk stress test status.        I have personally performed a face to face diagnostic evaluation on this patient. I have reviewed the ACP note and agree with the history, exam and plan of care, except as noted.

## 2025-03-05 NOTE — CONSULT NOTE ADULT - CONSULT REASON
[facility]  [address] [place] [address] [place] [address]  [phone]   open wound Right Leg

## 2025-07-15 NOTE — OCCUPATIONAL THERAPY INITIAL EVALUATION ADULT - EATING, PREVIOUS LEVEL OF FUNCTION, OT EVAL
GYNECOLOGIC ONCOLOGY  Dr. Tomas Soliman   No chief complaint on file.       FOLLOW-UP NOTE   7/21/2025      PCP: Nils Albrecht MD  Referring Provider: No ref. provider found    Chief Complaint: Post op visit, benign pathology     History of Present Illness:  Julissa Batista is a 66 year old female who was found to have complex hyperplasia with atypia. She chose to proceed with surgery.  On 7/1/25 she underwent:     Robotic hysterectomy, bilateral salpingo-oophorectomy.  Bilateral retroperitoneal dissection to establish standard borders for lymph node dissection and sentinel node mapping.  Injection of sentinel lymph node dye with successful bilateral sentinel lymph node mapping.  Lysis of adhesions of the omentum to the anterior abdominal wall of the colon from the left pelvic sidewall of the cecum from the right pelvic sidewall and right paracolic gutter and right IP ligament and of the bladder from the fundus of the uterus and the colon from the back of the uterus to establish normal posterior cul-de-sac      Final pathology revealed:   Uterus with cervix and bilateral adnexa, resection:   - Predominantly inactive appearing endometrium but with scattered small foci of complex atypical endometrial hyperplasia and with occasional small benign endometrial polyps (negative for invasive malignancy)..  - Cervix with squamous metaplasia, nabothian cysts and small benign endocervical polyp.  - Adenomyosis.  - Multiple leiomyomas.  - Right ovarian serous cystadenofibroma.  - Benign left ovary.  - Bilateral fallopian tubes, benign, with gross features suggesting prior tubal ligations.  - Negative for invasive malignancy.    She presents today for a postop visit.  She states she is doing well.  Denies any residual pain.  Denies any problems with bowel or bladder function.  Denies fever or chills.  States her incisions are healing well.         REVIEW OF SYSTEMS     A 12 point review of systems is performed and reviewed  with pertinent findings as noted above.        PAST MEDICAL, SURGICAL, FAMILY AND SOCIAL HISTORY     Past Medical History:   Diagnosis Date    Abdominal cramps 2025    Chronic kidney disease     Complex endometrial hyperplasia     Diabetes mellitus, type 2  (CMD)     Diabetic neuropathy  (CMD)     mild    Esophageal reflux     Gout     History of surgical removal of keloid     Hyperkeratosis     heels, bilaterally    Hypertension     Increased urinary frequency 2025    Insomnia     Shoulder pain, left 2025    Unspecified sleep apnea     uses cpap    Urticaria     Wears eyeglasses (for driving)        Past Surgical History:   Procedure Laterality Date     delivery+postpartum care       x2     section, low transverse      Cholecystectomy      Colonoscopy diagnostic  2018    Affi, MAC, positive cologuard, no polyps, 10 yr recall    Dilation and curettage  2025    Hysteroscopy, D&C, Polypectomy    Keloid excision      Ligate fallop tube,post c-sectn/surg      Sterilization ligate Fallopian tubes post c section    Robotic assisted hysterectomy  2025    RA TLH BSO per Kamelle    Tubal ligation         Family History   Problem Relation Age of Onset    Diabetes Mother     Cancer, Breast Mother 80    Hypertension Father     High blood pressure Father     Cancer, Kidney Father 80    Urticaria Sister     Patient is unaware of any medical problems Sister     Patient is unaware of any medical problems Sister     Other (Other) Daughter         \"pre cancer cells of uterus\"       Social History     Socioeconomic History    Marital status: Single     Spouse name: Not on file    Number of children: 1    Years of education: Not on file    Highest education level: Not on file   Occupational History    Occupation:      Comment: part time   Tobacco Use    Smoking status: Never     Passive exposure: Never    Smokeless tobacco: Never   Vaping Use    Vaping status:  never used   Substance and Sexual Activity    Alcohol use: No     Alcohol/week: 0.0 standard drinks of alcohol    Drug use: No    Sexual activity: Yes     Partners: Male     Birth control/protection: Surgical     Comment: TL   Other Topics Concern    Not on file   Social History Narrative    Not on file     Social Drivers of Health     Financial Resource Strain: Low Risk  (7/1/2025)    Financial Resource Strain     Unable to Get: None   Food Insecurity: Low Risk  (7/1/2025)    Food Insecurity     Worried about Food: Never true     Food is Gone: Never true   Transportation Needs: Not At Risk (7/1/2025)    Transportation Needs     Lack of Reliable Transportation: No   Physical Activity: Medium Risk (10/1/2024)    Exercise Vital Sign     Days of Exercise per Week: 2 days     Minutes of Exercise per Session: 30 min   Stress: Low Risk  (10/1/2024)    Stress     How Stressed: A little bit   Social Connections: Low Risk  (7/1/2025)    Social Connections     Social Connectivity: 5 or more times a week   Feeling safe in your relationship: Low Risk  (7/1/2025)    Interpersonal Safety     How often physically hurt: Never     How often insulted or talked down to: Never     How often threatened with harm: Never     How often scream or curse at: Never       MEDICATIONS AND ALLERGIES     Current Medications    ACETAMINOPHEN (TYLENOL) 500 MG TABLET    Take 2 tablets by mouth every 6 hours as needed (Pain).    ALLOPURINOL (ZYLOPRIM) 100 MG TABLET    Take 100 mg by mouth as needed.    AMLODIPINE (NORVASC) 5 MG TABLET    Take 1 tablet by mouth daily.    APIXABAN (ELIQUIS) 2.5 MG TAB    Take 1 tablet by mouth every 12 hours for 10 days.    BLOOD GLUCOSE (PHARMACIST CHOICE AUTOCODE) TEST STRIP    Test blood sugar 1 times daily. Diagnosis: DM2. Meter: pharmacist choice.    BLOOD GLUCOSE LANCETS    Test blood sugar 1 times daily. Diagnosis: DM2. Meter: pharmacist choice.    BLOOD GLUCOSE METER    Test blood sugar 1 times daily. Diagnosis:  DM2. Meter: pharmacist choice.    DIPHENHYDRAMINE (BENADRYL ALLERGY) 25 MG TABLET    Take 12.5 mg by mouth every 6 hours as needed for Itching.    DISPENSE    Cranberry tabs    EMPAGLIFLOZIN (JARDIANCE) 25 MG TABLET    Take 1 tablet by mouth daily (before breakfast).    FERROUS SULFATE 325 (65 FE) MG TABLET    Take 325 mg by mouth daily (with breakfast).    FLUTICASONE (FLONASE) 50 MCG/ACT NASAL SPRAY    Spray 1 spray in each nostril daily.    IBUPROFEN (MOTRIN) 800 MG TABLET    Take 1 tablet by mouth every 8 hours as needed for Pain.    LOSARTAN (COZAAR) 100 MG TABLET    TAKE 1 TABLET BY MOUTH DAILY    MELATONIN PO    Take 25 mg by mouth daily as needed.    METFORMIN (GLUCOPHAGE-XR) 500 MG 24 HR TABLET    TAKE 2 TABLETS BY MOUTH DAILY    OMEPRAZOLE (PRILOSEC) 20 MG CAPSULE    Take 20 mg by mouth as needed.    SIMETHICONE (MYLICON) 125 MG CHEWABLE TABLET    Chew 1 tablet by mouth 4 times daily as needed for Flatulence.    TRAMADOL (ULTRAM) 50 MG TABLET    Take 1 tablet by mouth every 6 hours as needed for Pain.    VITAMIN D, ERGOCALCIFEROL, 50 MCG (2,000 UNITS) CAPSULE    Take 1 capsule by mouth. Every other day       ALLERGIES:   Allergen Reactions    Cephalexin HIVES     Pt. Had outbreak hives on day ten of a course of cephalexin on 6/21/14.       ECOG PERFORMANCE STATUS     Patient's ECOG Performance Status is 1; restricted in physically strenuous activity but ambulatory and able to carry our work of a light sedentary nature, i.e: light house work, office work.    PHYSICAL EXAM   There were no vitals filed for this visit.  There is no height or weight on file to calculate BMI.    Constitutional: she appears well-developed and well-nourished, no apparent distress.   HEENT: normocephalic and atraumatic, no masses, lesions or abnormalities, conjunctivae, eye lids and EOMs are normal.   Abdomen: soft, non-tender, nondistended and no palpable masses or hepatosplenomegaly.  Incision: laparoscopic port sites: Clean,  dry, and intact.  Musculoskeletal: coordination and gait normal, appropriate muscle strength and tone.  Skin: skin is warm and dry, no rash or skin lesions noted, she is not diaphoretic.  Psychiatric: she is alert and oriented to person, place, and time, she has a normal mood and affect, her behavior is normal, judgment and thought content normal.    Nursing note and vitals reviewed.    ASSESSMENT AND PLAN   Julissa Batista is a 66 year old female status post the above mentioned surgery with benign pathology. This was discussed with her in detail. From a post op standpoint she is doing well. Restrictions out to 8-10 weeks were discussed.     As pathology is benign, no further follow up is needed here, however she should continue to have annual gynecologic exams.       At the end of the visit the patient stated an understanding and agreement with the above stated plan of care. All of her questions were answered to her satisfaction. I've encouraged her to call the office with any further issues, questions, or concerns.               independent